# Patient Record
Sex: FEMALE | Race: WHITE | Employment: OTHER | URBAN - NONMETROPOLITAN AREA
[De-identification: names, ages, dates, MRNs, and addresses within clinical notes are randomized per-mention and may not be internally consistent; named-entity substitution may affect disease eponyms.]

---

## 2017-05-01 ENCOUNTER — OFFICE VISIT (OUTPATIENT)
Dept: INTERNAL MEDICINE | Age: 72
End: 2017-05-01

## 2017-05-01 VITALS
WEIGHT: 215 LBS | SYSTOLIC BLOOD PRESSURE: 130 MMHG | HEART RATE: 76 BPM | HEIGHT: 63 IN | BODY MASS INDEX: 38.09 KG/M2 | DIASTOLIC BLOOD PRESSURE: 80 MMHG

## 2017-05-01 DIAGNOSIS — E78.5 HYPERLIPIDEMIA, UNSPECIFIED HYPERLIPIDEMIA TYPE: ICD-10-CM

## 2017-05-01 DIAGNOSIS — K21.9 GASTROESOPHAGEAL REFLUX DISEASE, ESOPHAGITIS PRESENCE NOT SPECIFIED: ICD-10-CM

## 2017-05-01 DIAGNOSIS — G47.33 OBSTRUCTIVE SLEEP APNEA SYNDROME: ICD-10-CM

## 2017-05-01 DIAGNOSIS — I44.7 LEFT BUNDLE BRANCH BLOCK: ICD-10-CM

## 2017-05-01 DIAGNOSIS — I25.10 CORONARY ARTERY DISEASE INVOLVING NATIVE CORONARY ARTERY OF NATIVE HEART WITHOUT ANGINA PECTORIS: ICD-10-CM

## 2017-05-01 DIAGNOSIS — E66.9 OBESITY (BMI 30-39.9): ICD-10-CM

## 2017-05-01 DIAGNOSIS — Z01.818 PREOP EXAM FOR INTERNAL MEDICINE: Primary | ICD-10-CM

## 2017-05-01 DIAGNOSIS — I10 ESSENTIAL HYPERTENSION: ICD-10-CM

## 2017-05-01 DIAGNOSIS — S83.206A TEAR OF MENISCUS OF RIGHT KNEE AS CURRENT INJURY, UNSPECIFIED MENISCUS, UNSPECIFIED TEAR TYPE, INITIAL ENCOUNTER: ICD-10-CM

## 2017-05-01 PROCEDURE — 4040F PNEUMOC VAC/ADMIN/RCVD: CPT | Performed by: INTERNAL MEDICINE

## 2017-05-01 PROCEDURE — 3017F COLORECTAL CA SCREEN DOC REV: CPT | Performed by: INTERNAL MEDICINE

## 2017-05-01 PROCEDURE — 99204 OFFICE O/P NEW MOD 45 MIN: CPT | Performed by: INTERNAL MEDICINE

## 2017-05-01 PROCEDURE — 1090F PRES/ABSN URINE INCON ASSESS: CPT | Performed by: INTERNAL MEDICINE

## 2017-05-01 PROCEDURE — 3014F SCREEN MAMMO DOC REV: CPT | Performed by: INTERNAL MEDICINE

## 2017-05-01 PROCEDURE — 1036F TOBACCO NON-USER: CPT | Performed by: INTERNAL MEDICINE

## 2017-05-01 PROCEDURE — G8419 CALC BMI OUT NRM PARAM NOF/U: HCPCS | Performed by: INTERNAL MEDICINE

## 2017-05-01 PROCEDURE — G8598 ASA/ANTIPLAT THER USED: HCPCS | Performed by: INTERNAL MEDICINE

## 2017-05-01 PROCEDURE — G8427 DOCREV CUR MEDS BY ELIG CLIN: HCPCS | Performed by: INTERNAL MEDICINE

## 2017-05-01 RX ORDER — CETIRIZINE HYDROCHLORIDE 10 MG/1
10 TABLET ORAL DAILY PRN
COMMUNITY

## 2017-07-17 ENCOUNTER — HOSPITAL ENCOUNTER (OUTPATIENT)
Dept: WOMENS IMAGING | Age: 72
Discharge: HOME | End: 2017-07-17
Payer: MEDICARE

## 2017-07-17 ENCOUNTER — HOSPITAL ENCOUNTER (OUTPATIENT)
Age: 72
Discharge: HOME | End: 2017-07-17
Payer: MEDICARE

## 2017-07-17 DIAGNOSIS — Z12.31 VISIT FOR SCREENING MAMMOGRAM: ICD-10-CM

## 2017-07-17 DIAGNOSIS — Z13.820 SCREENING FOR OSTEOPOROSIS: ICD-10-CM

## 2017-07-17 LAB
ALT SERPL-CCNC: 16 U/L (ref 11–66)
AST SERPL-CCNC: 24 U/L (ref 5–40)
BACTERIA: NORMAL
BILIRUBIN URINE: NEGATIVE
BLOOD, URINE: NEGATIVE
CASTS: NORMAL /LPF
CASTS: NORMAL /LPF
CHARACTER, URINE: NORMAL
CHOLESTEROL, TOTAL: 200 MG/DL (ref 100–199)
COLOR: YELLOW
CRYSTALS: NORMAL
EPITHELIAL CELLS, UA: NORMAL /HPF
GLUCOSE, URINE: NEGATIVE MG/DL
HDLC SERPL-MCNC: 92 MG/DL
KETONES, URINE: NEGATIVE
LDL CHOLESTEROL CALCULATED: 90 MG/DL
LEUKOCYTE EST, POC: NEGATIVE
MISCELLANEOUS LAB TEST RESULT: NORMAL
NITRITE, URINE: NEGATIVE
PH UA: 5
PROTEIN UA: NEGATIVE MG/DL
RBC URINE: NORMAL /HPF
RENAL EPITHELIAL, UA: NORMAL
SPECIFIC GRAVITY UA: 1.02 (ref 1–1.03)
TRIGL SERPL-MCNC: 91 MG/DL (ref 0–199)
UROBILINOGEN, URINE: 0.2 EU/DL
WBC UA: NORMAL /HPF
YEAST: NORMAL

## 2017-07-17 PROCEDURE — 36415 COLL VENOUS BLD VENIPUNCTURE: CPT

## 2017-07-17 PROCEDURE — 77080 DXA BONE DENSITY AXIAL: CPT

## 2017-07-17 PROCEDURE — 84460 ALANINE AMINO (ALT) (SGPT): CPT

## 2017-07-17 PROCEDURE — 84450 TRANSFERASE (AST) (SGOT): CPT

## 2017-07-17 PROCEDURE — 81001 URINALYSIS AUTO W/SCOPE: CPT

## 2017-07-17 PROCEDURE — 80061 LIPID PANEL: CPT

## 2017-07-17 PROCEDURE — 77063 BREAST TOMOSYNTHESIS BI: CPT

## 2017-07-21 ENCOUNTER — HOSPITAL ENCOUNTER (OUTPATIENT)
Dept: PULMONOLOGY | Age: 72
Discharge: HOME | End: 2017-07-21
Payer: MEDICARE

## 2017-07-21 PROCEDURE — 94060 EVALUATION OF WHEEZING: CPT

## 2017-07-27 ENCOUNTER — HOSPITAL ENCOUNTER (OUTPATIENT)
Age: 72
Discharge: HOME | End: 2017-07-27
Payer: MEDICARE

## 2017-07-27 PROCEDURE — 82272 OCCULT BLD FECES 1-3 TESTS: CPT

## 2017-08-18 ENCOUNTER — HOSPITAL ENCOUNTER (OUTPATIENT)
Age: 72
Discharge: HOME | End: 2017-08-18
Payer: MEDICARE

## 2017-08-18 LAB
ALBUMIN SERPL-MCNC: 4 G/DL (ref 3.5–5.1)
ALP BLD-CCNC: 79 U/L (ref 38–126)
ALT SERPL-CCNC: 15 U/L (ref 11–66)
ANION GAP SERPL CALCULATED.3IONS-SCNC: 14 MEQ/L (ref 8–16)
AST SERPL-CCNC: 23 U/L (ref 5–40)
BILIRUB SERPL-MCNC: 1 MG/DL (ref 0.3–1.2)
BILIRUBIN DIRECT: < 0.2 MG/DL (ref 0–0.3)
BUN BLDV-MCNC: 18 MG/DL (ref 7–22)
CALCIUM SERPL-MCNC: 9.3 MG/DL (ref 8.5–10.5)
CHLORIDE BLD-SCNC: 106 MEQ/L (ref 98–111)
CHOLESTEROL, TOTAL: 186 MG/DL (ref 100–199)
CO2: 22 MEQ/L (ref 23–33)
CREAT SERPL-MCNC: 0.6 MG/DL (ref 0.4–1.2)
GFR SERPL CREATININE-BSD FRML MDRD: > 90 ML/MIN/1.73M2
GLUCOSE BLD-MCNC: 104 MG/DL (ref 70–108)
HCT VFR BLD CALC: 37.8 % (ref 37–47)
HDLC SERPL-MCNC: 80 MG/DL
HEMOGLOBIN: 12.7 GM/DL (ref 12–16)
LDL CHOLESTEROL CALCULATED: 93 MG/DL
MCH RBC QN AUTO: 29.1 PG (ref 27–31)
MCHC RBC AUTO-ENTMCNC: 33.6 GM/DL (ref 33–37)
MCV RBC AUTO: 86.8 FL (ref 81–99)
PDW BLD-RTO: 14.1 % (ref 11.5–14.5)
PLATELET # BLD: 232 THOU/MM3 (ref 130–400)
PMV BLD AUTO: 7.8 MCM (ref 7.4–10.4)
POTASSIUM SERPL-SCNC: 4.3 MEQ/L (ref 3.5–5.2)
RBC # BLD: 4.36 MILL/MM3 (ref 4.2–5.4)
SODIUM BLD-SCNC: 142 MEQ/L (ref 135–145)
TOTAL PROTEIN: 6.9 G/DL (ref 6.1–8)
TRIGL SERPL-MCNC: 65 MG/DL (ref 0–199)
WBC # BLD: 4.3 THOU/MM3 (ref 4.8–10.8)

## 2017-08-18 PROCEDURE — 82248 BILIRUBIN DIRECT: CPT

## 2017-08-18 PROCEDURE — 80061 LIPID PANEL: CPT

## 2017-08-18 PROCEDURE — 36415 COLL VENOUS BLD VENIPUNCTURE: CPT

## 2017-08-18 PROCEDURE — 80053 COMPREHEN METABOLIC PANEL: CPT

## 2017-08-18 PROCEDURE — 85027 COMPLETE CBC AUTOMATED: CPT

## 2017-09-18 ENCOUNTER — HOSPITAL ENCOUNTER (OUTPATIENT)
Age: 72
Discharge: HOME | End: 2017-09-18
Payer: MEDICARE

## 2017-09-18 ENCOUNTER — HOSPITAL ENCOUNTER (OUTPATIENT)
Dept: GENERAL RADIOLOGY | Age: 72
Discharge: HOME | End: 2017-09-18
Payer: MEDICARE

## 2017-09-18 DIAGNOSIS — R06.02 SOB (SHORTNESS OF BREATH): ICD-10-CM

## 2017-09-18 DIAGNOSIS — J45.909 UNCOMPLICATED ASTHMA, UNSPECIFIED ASTHMA SEVERITY: ICD-10-CM

## 2017-09-18 PROCEDURE — 71020 XR CHEST STANDARD TWO VW: CPT

## 2017-11-25 ENCOUNTER — HOSPITAL ENCOUNTER (EMERGENCY)
Age: 72
Discharge: HOME | End: 2017-11-25
Payer: MEDICARE

## 2017-11-25 VITALS
HEART RATE: 74 BPM | BODY MASS INDEX: 37.21 KG/M2 | HEIGHT: 63 IN | TEMPERATURE: 98.1 F | WEIGHT: 210 LBS | OXYGEN SATURATION: 96 % | DIASTOLIC BLOOD PRESSURE: 74 MMHG | SYSTOLIC BLOOD PRESSURE: 149 MMHG | RESPIRATION RATE: 16 BRPM

## 2017-11-25 DIAGNOSIS — J01.10 ACUTE NON-RECURRENT FRONTAL SINUSITIS: Primary | ICD-10-CM

## 2017-11-25 PROCEDURE — 99213 OFFICE O/P EST LOW 20 MIN: CPT | Performed by: NURSE PRACTITIONER

## 2017-11-25 PROCEDURE — 99214 OFFICE O/P EST MOD 30 MIN: CPT

## 2017-11-25 RX ORDER — AZITHROMYCIN 250 MG/1
TABLET, FILM COATED ORAL
Qty: 6 TABLET | Refills: 0 | Status: SHIPPED | OUTPATIENT
Start: 2017-11-25 | End: 2018-11-20

## 2017-11-25 RX ORDER — TRAMADOL HYDROCHLORIDE 50 MG/1
50 TABLET ORAL EVERY 6 HOURS PRN
COMMUNITY
End: 2019-05-02 | Stop reason: SDUPTHER

## 2017-11-25 ASSESSMENT — ENCOUNTER SYMPTOMS
SORE THROAT: 1
CONSTIPATION: 0
SHORTNESS OF BREATH: 0
COUGH: 1
SINUS PRESSURE: 1
DIARRHEA: 0
WHEEZING: 0
NAUSEA: 0
SINUS PAIN: 1
ABDOMINAL PAIN: 0

## 2017-11-25 NOTE — ED NOTES
Discharge assessment complete. No changes. All discharge education and information given. Pt instructed to go to ED for any shortness of breath, chest pain or Abd pain. Verbalized Understanding. Left stable.      Erick Bear, JONATHAN  96/00/92 [id number]

## 2017-11-25 NOTE — ED PROVIDER NOTES
[facility]  Urgent Care Encounter      CHIEF COMPLAINT       Chief Complaint   Patient presents with    Cough    Nasal Congestion    Facial Pain    Headache       Nurses Notes reviewed and I agree except as noted in the HPI. HISTORY OF PRESENT ILLNESS   Sal Boateng is a 67 y.o. female who presents with one Week of worsening sinus pressure and pain, headache, nasal congestion, otalgia and cough. Denies fever, nausea vomiting diarrhea, chest pain or sore throat. REVIEW OF SYSTEMS     Review of Systems   Constitutional: Negative for appetite change, diaphoresis, fatigue and fever. HENT: Positive for congestion, postnasal drip, sinus pain, sinus pressure and sore throat. Negative for tinnitus. Eyes: Negative for visual disturbance. Respiratory: Positive for cough. Negative for shortness of breath and wheezing. Cardiovascular: Negative for chest pain and leg swelling. Gastrointestinal: Negative for abdominal pain, constipation, diarrhea and nausea. Genitourinary: Negative for frequency. Musculoskeletal: Negative for arthralgias, myalgias and neck stiffness. Skin: Negative for rash. Neurological: Positive for headaches. Negative for dizziness and weakness. Psychiatric/Behavioral: The patient is not nervous/anxious. All other systems reviewed and are negative. PAST MEDICAL HISTORY         Diagnosis Date    Anxiety     Back pain     CAD (coronary artery disease)     mild disease in small vessels per cath 2014    GERD (gastroesophageal reflux disease)     Hives     Hyperlipidemia     Hypertension     Left bundle branch block     Measles     Obesity (BMI 30-39. 9)     Seasonal allergies     Sleep apnea     wears CPAP nightly       SURGICAL HISTORY     Patient  has a past surgical history that includes sinus surgery (1990); Rotator cuff repair (Bilateral, 2006  & 2009); Carpal tunnel release (08/2006); Rotator cuff repair (11/2009);  Cardiac catheterization (10/30/14); eye surgery (Left, 04/23/2016); eye surgery (Right, 05/24/2006); Carpal tunnel release (Left, 2009); and Colonoscopy (2007). CURRENT MEDICATIONS       Discharge Medication List as of 11/25/2017  6:02 PM      CONTINUE these medications which have NOT CHANGED    Details   traMADol (ULTRAM) 50 MG tablet Take 50 mg by mouth every 6 hours as needed for Pain . Historical Med      cetirizine (ZYRTEC) 10 MG tablet Take 10 mg by mouth daily as needed for AllergiesHistorical Med      D3-50 13921 UNITS CAPS TAKE ONE CAPSULE BY MOUTH ONCE A WEEK, Disp-30 capsule, R-0Normal      Lactobacillus-Inulin ([facility]) CAPS Take by mouth      Magnesium Oxide 500 MG CAPS Take by mouth 3 times daily For more movements      nitroGLYCERIN (NITROSTAT) 0.4 MG SL tablet Place 0.4 mg under the tongue every 5 minutes as needed for Chest pain. albuterol-ipratropium (COMBIVENT RESPIMAT)  MCG/ACT AERS inhaler Inhale 1 puff into the lungs every 6 hours as needed for Wheezing. aspirin 81 MG tablet Take 81 mg by mouth daily. b complex vitamins capsule Take 1 capsule by mouth three times daily B-Right by Tyra Flight at [facility]  before and as finish breakfast and before supper      isosorbide mononitrate (IMDUR) 30 MG CR tablet Take 30 mg by mouth daily. diltiazem (CARDIZEM SR) 120 MG SR capsule Take 120 mg by mouth daily. pravastatin (PRAVACHOL) 40 MG tablet Take 40 mg by mouth daily. fluticasone (FLONASE) 50 MCG/ACT nasal spray 2 sprays by Nasal route daily. Apply daily to each nare, Disp-1 Bottle, R-0      Omega-3 1400 MG CAPS Take 1 capsule by mouth 4 times daily [facility] #[id number]  before and as finish breakfast and supperHistorical Med      Cinnamon 500 MG CAPS Take 3 capsules by mouth 4 times daily before and as finish breakfast and supper      sertraline (ZOLOFT) 50 MG tablet Take 50 mg by mouth daily.              ALLERGIES     Patient is is allergic to duricef and thought content normal. Cognition and memory are normal.       DIAGNOSTIC RESULTS   Labs:No results found for this visit on 11/25/17. IMAGING:    URGENT CARE COURSE:     Vitals:    11/25/17 1753   BP: (!) 149/74   Pulse: 74   Resp: 16   Temp: 98.1 °F (36.7 °C)   SpO2: 96%   Weight: 210 lb (95.3 kg)   Height: 5' 3\" (1.6 m)     Patient is nontoxic and afebrile with a pulse ox of 96%. Positive maxillofacial sinus tenderness to palpation, significant nasal congestion is audible. TMs are bulging come posterior oropharynx is normal with no cervical adenopathy. Skin is warm and dry, respirations are easy and lungs are clear. Presentation consistent with acute frontal sinusitis. Medications - No data to display  PROCEDURES:  None  FINAL IMPRESSION      1.  Acute non-recurrent frontal sinusitis        DISPOSITION/PLAN   DISPOSITION Decision to Discharge  PATIENT REFERRED TO:  Gertrude Barakat MD  [address] [place] [address]    In 3 days  If symptoms worsen    DISCHARGE MEDICATIONS:  Discharge Medication List as of 11/25/2017  6:02 PM      START taking these medications    Details   azithromycin (ZITHROMAX Z-GLORIA) 250 MG tablet 2 tablets day 1 then1 tablet days 2 - 5., Disp-6 tablet, R-0Normal           Discharge Medication List as of 11/25/2017  6:02 PM          LAVONNE Pruitt NP  11/25/17 7207

## 2018-07-27 ENCOUNTER — APPOINTMENT (OUTPATIENT)
Dept: GENERAL RADIOLOGY | Age: 73
End: 2018-07-27
Payer: MEDICARE

## 2018-07-27 ENCOUNTER — HOSPITAL ENCOUNTER (EMERGENCY)
Age: 73
Discharge: HOME | End: 2018-07-27
Attending: EMERGENCY MEDICINE
Payer: MEDICARE

## 2018-07-27 VITALS
SYSTOLIC BLOOD PRESSURE: 117 MMHG | HEART RATE: 67 BPM | TEMPERATURE: 97.5 F | DIASTOLIC BLOOD PRESSURE: 78 MMHG | RESPIRATION RATE: 16 BRPM | OXYGEN SATURATION: 96 %

## 2018-07-27 DIAGNOSIS — R07.89 OTHER CHEST PAIN: Primary | ICD-10-CM

## 2018-07-27 LAB
ANION GAP SERPL CALCULATED.3IONS-SCNC: 16 MEQ/L (ref 8–16)
BASOPHILS # BLD: 1.4 %
BASOPHILS ABSOLUTE: 0.1 THOU/MM3 (ref 0–0.1)
BUN BLDV-MCNC: 18 MG/DL (ref 7–22)
CALCIUM SERPL-MCNC: 9.8 MG/DL (ref 8.5–10.5)
CHLORIDE BLD-SCNC: 105 MEQ/L (ref 98–111)
CO2: 21 MEQ/L (ref 23–33)
CREAT SERPL-MCNC: 0.6 MG/DL (ref 0.4–1.2)
EKG ATRIAL RATE: 78 BPM
EKG P AXIS: 45 DEGREES
EKG P-R INTERVAL: 214 MS
EKG Q-T INTERVAL: 446 MS
EKG QRS DURATION: 168 MS
EKG QTC CALCULATION (BAZETT): 508 MS
EKG R AXIS: 47 DEGREES
EKG T AXIS: 93 DEGREES
EKG VENTRICULAR RATE: 78 BPM
EOSINOPHIL # BLD: 6.8 %
EOSINOPHILS ABSOLUTE: 0.3 THOU/MM3 (ref 0–0.4)
ERYTHROCYTE [DISTWIDTH] IN BLOOD BY AUTOMATED COUNT: 13.2 % (ref 11.5–14.5)
ERYTHROCYTE [DISTWIDTH] IN BLOOD BY AUTOMATED COUNT: 43.9 FL (ref 35–45)
GFR SERPL CREATININE-BSD FRML MDRD: > 90 ML/MIN/1.73M2
GLUCOSE BLD-MCNC: 108 MG/DL (ref 70–108)
HCT VFR BLD CALC: 41.7 % (ref 37–47)
HEMOGLOBIN: 13.7 GM/DL (ref 12–16)
IMMATURE GRANS (ABS): 0 THOU/MM3 (ref 0–0.07)
IMMATURE GRANULOCYTES: 0 %
LYMPHOCYTES # BLD: 37.3 %
LYMPHOCYTES ABSOLUTE: 1.6 THOU/MM3 (ref 1–4.8)
MCH RBC QN AUTO: 30 PG (ref 26–33)
MCHC RBC AUTO-ENTMCNC: 32.9 GM/DL (ref 32.2–35.5)
MCV RBC AUTO: 91.2 FL (ref 81–99)
MONOCYTES # BLD: 6.1 %
MONOCYTES ABSOLUTE: 0.3 THOU/MM3 (ref 0.4–1.3)
NUCLEATED RED BLOOD CELLS: 0 /100 WBC
OSMOLALITY CALCULATION: 285.5 MOSMOL/KG (ref 275–300)
PLATELET # BLD: 213 THOU/MM3 (ref 130–400)
PMV BLD AUTO: 10.1 FL (ref 9.4–12.4)
POTASSIUM SERPL-SCNC: 4 MEQ/L (ref 3.5–5.2)
RBC # BLD: 4.57 MILL/MM3 (ref 4.2–5.4)
SEG NEUTROPHILS: 48.4 %
SEGMENTED NEUTROPHILS ABSOLUTE COUNT: 2.1 THOU/MM3 (ref 1.8–7.7)
SODIUM BLD-SCNC: 142 MEQ/L (ref 135–145)
TROPONIN T: < 0.01 NG/ML
TROPONIN T: < 0.01 NG/ML
WBC # BLD: 4.4 THOU/MM3 (ref 4.8–10.8)

## 2018-07-27 PROCEDURE — 36415 COLL VENOUS BLD VENIPUNCTURE: CPT

## 2018-07-27 PROCEDURE — 80048 BASIC METABOLIC PNL TOTAL CA: CPT

## 2018-07-27 PROCEDURE — 71045 X-RAY EXAM CHEST 1 VIEW: CPT

## 2018-07-27 PROCEDURE — 99285 EMERGENCY DEPT VISIT HI MDM: CPT

## 2018-07-27 PROCEDURE — 6370000000 HC RX 637 (ALT 250 FOR IP): Performed by: EMERGENCY MEDICINE

## 2018-07-27 PROCEDURE — 6370000000 HC RX 637 (ALT 250 FOR IP)

## 2018-07-27 PROCEDURE — 85025 COMPLETE CBC W/AUTO DIFF WBC: CPT

## 2018-07-27 PROCEDURE — 93010 ELECTROCARDIOGRAM REPORT: CPT | Performed by: INTERNAL MEDICINE

## 2018-07-27 PROCEDURE — 93005 ELECTROCARDIOGRAM TRACING: CPT | Performed by: EMERGENCY MEDICINE

## 2018-07-27 PROCEDURE — 84484 ASSAY OF TROPONIN QUANT: CPT

## 2018-07-27 RX ORDER — NITROGLYCERIN 0.4 MG/1
0.4 TABLET SUBLINGUAL EVERY 5 MIN PRN
Status: DISCONTINUED | OUTPATIENT
Start: 2018-07-27 | End: 2018-07-27 | Stop reason: HOSPADM

## 2018-07-27 RX ADMIN — NITROGLYCERIN 0.4 MG: 0.4 TABLET SUBLINGUAL at 13:23

## 2018-07-27 RX ADMIN — NITROGLYCERIN 0.4 MG: 0.4 TABLET SUBLINGUAL at 13:16

## 2018-07-27 ASSESSMENT — ENCOUNTER SYMPTOMS
VOMITING: 0
SHORTNESS OF BREATH: 0
DIARRHEA: 0
COUGH: 0
EYE PAIN: 0
ABDOMINAL PAIN: 0
RHINORRHEA: 0
BACK PAIN: 1
NAUSEA: 0
EYE DISCHARGE: 0
WHEEZING: 0
SORE THROAT: 0

## 2018-07-27 ASSESSMENT — PAIN SCALES - GENERAL
PAINLEVEL_OUTOF10: 1
PAINLEVEL_OUTOF10: 1
PAINLEVEL_OUTOF10: 6
PAINLEVEL_OUTOF10: 2

## 2018-07-27 ASSESSMENT — PAIN DESCRIPTION - ORIENTATION
ORIENTATION: LEFT

## 2018-07-27 ASSESSMENT — PAIN DESCRIPTION - LOCATION
LOCATION: CHEST
LOCATION: CHEST
LOCATION: CHEST;ARM
LOCATION: CHEST

## 2018-07-27 ASSESSMENT — PAIN DESCRIPTION - FREQUENCY: FREQUENCY: CONTINUOUS

## 2018-07-27 ASSESSMENT — PAIN DESCRIPTION - DESCRIPTORS: DESCRIPTORS: TIGHTNESS

## 2018-07-27 ASSESSMENT — PAIN DESCRIPTION - PAIN TYPE
TYPE: ACUTE PAIN
TYPE: ACUTE PAIN

## 2018-07-27 ASSESSMENT — PAIN DESCRIPTION - PROGRESSION: CLINICAL_PROGRESSION: NOT CHANGED

## 2018-07-27 NOTE — ED NOTES
Pt states CP is much better after NTG x 2. VSS- family at bedside.       Cassia Storm RN  07/27/18 [id number]

## 2018-07-27 NOTE — ED PROVIDER NOTES
[facility]  eMERGENCY dEPARTMENT eNCOUnter          CHIEF COMPLAINT       Chief Complaint   Patient presents with    Chest Pain       Nurses Notes reviewed and I agree except as noted in the HPI. HISTORY OF PRESENT ILLNESS    May Grier is a 68 y.o. female who presents to the Emergency Department for the evaluation of chest pain that began at 9:00 AM this morning. Patient explains that it woke her up from her sleep and that her pain is located mid-sternally but radiated down her left breast, left arm, and left hand. She is also having some pain between her shoulder blades. Her cardiologist is Dr. Hunter Hicks MD and she had a stress test in 2014 as well as a cardiac catheterization which were both normal. She does have nitro but did not take any but does take aspirin daily. Patient denies any fevers, chills, nausea, vomiting, diarrhea, or shortness of breath. Patient has no further complaints during initial evaluation. The HPI was provided by the patient. REVIEW OF SYSTEMS     Review of Systems   Constitutional: Negative for appetite change, chills, fatigue and fever. HENT: Negative for congestion, ear pain, rhinorrhea and sore throat. Eyes: Negative for pain, discharge and visual disturbance. Respiratory: Negative for cough, shortness of breath and wheezing. Cardiovascular: Positive for chest pain (mid sternal- left breast pain). Negative for palpitations and leg swelling. Gastrointestinal: Negative for abdominal pain, diarrhea, nausea and vomiting. Genitourinary: Negative for difficulty urinating, dysuria, hematuria and vaginal discharge. Musculoskeletal: Positive for arthralgias (left arm pain and left hand pain) and back pain (between shoulders). Negative for joint swelling and neck pain. Skin: Negative for pallor and rash. Neurological: Negative for dizziness, syncope, weakness, light-headedness, numbness and headaches. Hematological: Negative for adenopathy.

## 2018-07-27 NOTE — ED NOTES
Pt was up to bathroom without difficulty. States doctor was at bedside to update her on results and POC. Pt voices understanding. States pain is unchanged. Respirations are easy and unlabored. Skin warm and dry. Family at bedside. Warm blanket provided for comfort.       Claude Rumple, RN  07/27/18 7271

## 2018-09-10 ENCOUNTER — HOSPITAL ENCOUNTER (OUTPATIENT)
Age: 73
Discharge: HOME | End: 2018-09-10
Payer: MEDICARE

## 2018-09-10 LAB
ALBUMIN SERPL-MCNC: 4.5 G/DL (ref 3.5–5.1)
ALP BLD-CCNC: 88 U/L (ref 38–126)
ALT SERPL-CCNC: 14 U/L (ref 11–66)
ANION GAP SERPL CALCULATED.3IONS-SCNC: 13 MEQ/L (ref 8–16)
AST SERPL-CCNC: 22 U/L (ref 5–40)
BILIRUB SERPL-MCNC: 1.1 MG/DL (ref 0.3–1.2)
BILIRUBIN DIRECT: < 0.2 MG/DL (ref 0–0.3)
BUN BLDV-MCNC: 14 MG/DL (ref 7–22)
CALCIUM SERPL-MCNC: 9.9 MG/DL (ref 8.5–10.5)
CHLORIDE BLD-SCNC: 104 MEQ/L (ref 98–111)
CHOLESTEROL, TOTAL: 177 MG/DL (ref 100–199)
CO2: 25 MEQ/L (ref 23–33)
CREAT SERPL-MCNC: 0.7 MG/DL (ref 0.4–1.2)
ERYTHROCYTE [DISTWIDTH] IN BLOOD BY AUTOMATED COUNT: 13.4 % (ref 11.5–14.5)
ERYTHROCYTE [DISTWIDTH] IN BLOOD BY AUTOMATED COUNT: 45.1 FL (ref 35–45)
GFR SERPL CREATININE-BSD FRML MDRD: 82 ML/MIN/1.73M2
GLUCOSE BLD-MCNC: 104 MG/DL (ref 70–108)
HCT VFR BLD CALC: 41.1 % (ref 37–47)
HDLC SERPL-MCNC: 79 MG/DL
HEMOGLOBIN: 13.5 GM/DL (ref 12–16)
LDL CHOLESTEROL CALCULATED: 82 MG/DL
MCH RBC QN AUTO: 30.1 PG (ref 26–33)
MCHC RBC AUTO-ENTMCNC: 32.8 GM/DL (ref 32.2–35.5)
MCV RBC AUTO: 91.7 FL (ref 81–99)
PLATELET # BLD: 199 THOU/MM3 (ref 130–400)
PMV BLD AUTO: 10.4 FL (ref 9.4–12.4)
POTASSIUM SERPL-SCNC: 4.4 MEQ/L (ref 3.5–5.2)
RBC # BLD: 4.48 MILL/MM3 (ref 4.2–5.4)
SODIUM BLD-SCNC: 142 MEQ/L (ref 135–145)
TOTAL PROTEIN: 7.3 G/DL (ref 6.1–8)
TRIGL SERPL-MCNC: 81 MG/DL (ref 0–199)
TSH SERPL DL<=0.05 MIU/L-ACNC: 2.77 UIU/ML (ref 0.4–4.2)
WBC # BLD: 4.1 THOU/MM3 (ref 4.8–10.8)

## 2018-09-10 PROCEDURE — 36415 COLL VENOUS BLD VENIPUNCTURE: CPT

## 2018-09-10 PROCEDURE — 85027 COMPLETE CBC AUTOMATED: CPT

## 2018-09-10 PROCEDURE — 84443 ASSAY THYROID STIM HORMONE: CPT

## 2018-09-10 PROCEDURE — 80061 LIPID PANEL: CPT

## 2018-09-10 PROCEDURE — 80053 COMPREHEN METABOLIC PANEL: CPT

## 2018-09-10 PROCEDURE — 82248 BILIRUBIN DIRECT: CPT

## 2018-09-21 ENCOUNTER — HOSPITAL ENCOUNTER (OUTPATIENT)
Age: 73
Discharge: HOME | End: 2018-09-21
Payer: MEDICARE

## 2018-09-21 LAB
ALBUMIN SERPL-MCNC: 4.5 G/DL (ref 3.5–5.1)
ALP BLD-CCNC: 83 U/L (ref 38–126)
ALT SERPL-CCNC: 13 U/L (ref 11–66)
ANION GAP SERPL CALCULATED.3IONS-SCNC: 12 MEQ/L (ref 8–16)
AST SERPL-CCNC: 20 U/L (ref 5–40)
BASOPHILS # BLD: 0.9 %
BASOPHILS ABSOLUTE: 0 THOU/MM3 (ref 0–0.1)
BILIRUB SERPL-MCNC: 0.9 MG/DL (ref 0.3–1.2)
BILIRUBIN DIRECT: < 0.2 MG/DL (ref 0–0.3)
BUN BLDV-MCNC: 16 MG/DL (ref 7–22)
CALCIUM SERPL-MCNC: 9.3 MG/DL (ref 8.5–10.5)
CHLORIDE BLD-SCNC: 106 MEQ/L (ref 98–111)
CHOLESTEROL, TOTAL: 177 MG/DL (ref 100–199)
CO2: 24 MEQ/L (ref 23–33)
CREAT SERPL-MCNC: 0.7 MG/DL (ref 0.4–1.2)
EOSINOPHIL # BLD: 6.9 %
EOSINOPHILS ABSOLUTE: 0.3 THOU/MM3 (ref 0–0.4)
ERYTHROCYTE [DISTWIDTH] IN BLOOD BY AUTOMATED COUNT: 13 % (ref 11.5–14.5)
ERYTHROCYTE [DISTWIDTH] IN BLOOD BY AUTOMATED COUNT: 42.8 FL (ref 35–45)
GFR SERPL CREATININE-BSD FRML MDRD: 82 ML/MIN/1.73M2
GLUCOSE BLD-MCNC: 99 MG/DL (ref 70–108)
HCT VFR BLD CALC: 39.9 % (ref 37–47)
HDLC SERPL-MCNC: 76 MG/DL
HEMOGLOBIN: 13.4 GM/DL (ref 12–16)
IMMATURE GRANS (ABS): 0.01 THOU/MM3 (ref 0–0.07)
IMMATURE GRANULOCYTES: 0.2 %
LDL CHOLESTEROL CALCULATED: 79 MG/DL
LYMPHOCYTES # BLD: 28.3 %
LYMPHOCYTES ABSOLUTE: 1.2 THOU/MM3 (ref 1–4.8)
MCH RBC QN AUTO: 30.4 PG (ref 26–33)
MCHC RBC AUTO-ENTMCNC: 33.6 GM/DL (ref 32.2–35.5)
MCV RBC AUTO: 90.5 FL (ref 81–99)
MONOCYTES # BLD: 6.5 %
MONOCYTES ABSOLUTE: 0.3 THOU/MM3 (ref 0.4–1.3)
NUCLEATED RED BLOOD CELLS: 0 /100 WBC
PLATELET # BLD: 205 THOU/MM3 (ref 130–400)
PMV BLD AUTO: 10 FL (ref 9.4–12.4)
POTASSIUM SERPL-SCNC: 4.2 MEQ/L (ref 3.5–5.2)
RBC # BLD: 4.41 MILL/MM3 (ref 4.2–5.4)
SEG NEUTROPHILS: 57.2 %
SEGMENTED NEUTROPHILS ABSOLUTE COUNT: 2.5 THOU/MM3 (ref 1.8–7.7)
SODIUM BLD-SCNC: 142 MEQ/L (ref 135–145)
TOTAL PROTEIN: 7.1 G/DL (ref 6.1–8)
TRIGL SERPL-MCNC: 108 MG/DL (ref 0–199)
WBC # BLD: 4.3 THOU/MM3 (ref 4.8–10.8)

## 2018-09-21 PROCEDURE — 80053 COMPREHEN METABOLIC PANEL: CPT

## 2018-09-21 PROCEDURE — 80061 LIPID PANEL: CPT

## 2018-09-21 PROCEDURE — 36415 COLL VENOUS BLD VENIPUNCTURE: CPT

## 2018-09-21 PROCEDURE — 85025 COMPLETE CBC W/AUTO DIFF WBC: CPT

## 2018-09-21 PROCEDURE — 82248 BILIRUBIN DIRECT: CPT

## 2018-11-20 ENCOUNTER — HOSPITAL ENCOUNTER (EMERGENCY)
Age: 73
Discharge: HOME | End: 2018-11-20
Payer: MEDICARE

## 2018-11-20 VITALS
TEMPERATURE: 98.1 F | SYSTOLIC BLOOD PRESSURE: 169 MMHG | BODY MASS INDEX: 34.49 KG/M2 | RESPIRATION RATE: 16 BRPM | HEART RATE: 66 BPM | OXYGEN SATURATION: 96 % | DIASTOLIC BLOOD PRESSURE: 79 MMHG | HEIGHT: 64 IN | WEIGHT: 202 LBS

## 2018-11-20 DIAGNOSIS — J01.00 ACUTE MAXILLARY SINUSITIS, RECURRENCE NOT SPECIFIED: Primary | ICD-10-CM

## 2018-11-20 PROCEDURE — 99212 OFFICE O/P EST SF 10 MIN: CPT

## 2018-11-20 PROCEDURE — 99213 OFFICE O/P EST LOW 20 MIN: CPT | Performed by: NURSE PRACTITIONER

## 2018-11-20 RX ORDER — AZITHROMYCIN 250 MG/1
TABLET, FILM COATED ORAL
Qty: 6 TABLET | Refills: 0 | Status: SHIPPED | OUTPATIENT
Start: 2018-11-20 | End: 2019-01-07 | Stop reason: ALTCHOICE

## 2018-11-20 ASSESSMENT — ENCOUNTER SYMPTOMS
SINUS PRESSURE: 1
BACK PAIN: 0
RHINORRHEA: 0
ABDOMINAL PAIN: 0
VOMITING: 0
CHEST TIGHTNESS: 0
NAUSEA: 0
WHEEZING: 0
DIARRHEA: 0
COUGH: 0
SWOLLEN GLANDS: 0
SINUS PAIN: 0
SORE THROAT: 0

## 2019-01-07 ENCOUNTER — HOSPITAL ENCOUNTER (EMERGENCY)
Age: 74
Discharge: HOME | End: 2019-01-07
Payer: MEDICARE

## 2019-01-07 VITALS
OXYGEN SATURATION: 95 % | TEMPERATURE: 97.5 F | SYSTOLIC BLOOD PRESSURE: 161 MMHG | DIASTOLIC BLOOD PRESSURE: 76 MMHG | RESPIRATION RATE: 16 BRPM | HEART RATE: 69 BPM

## 2019-01-07 DIAGNOSIS — J01.00 ACUTE NON-RECURRENT MAXILLARY SINUSITIS: Primary | ICD-10-CM

## 2019-01-07 PROCEDURE — 99213 OFFICE O/P EST LOW 20 MIN: CPT | Performed by: NURSE PRACTITIONER

## 2019-01-07 PROCEDURE — 99212 OFFICE O/P EST SF 10 MIN: CPT

## 2019-01-07 RX ORDER — AZITHROMYCIN 250 MG/1
TABLET, FILM COATED ORAL
Qty: 6 TABLET | Refills: 0 | Status: SHIPPED | OUTPATIENT
Start: 2019-01-07 | End: 2019-03-27

## 2019-01-07 ASSESSMENT — ENCOUNTER SYMPTOMS
NAUSEA: 0
DIARRHEA: 0
SINUS PAIN: 1
RHINORRHEA: 0
SHORTNESS OF BREATH: 0
BACK PAIN: 0
SWOLLEN GLANDS: 0
ABDOMINAL PAIN: 0
VOMITING: 0
SORE THROAT: 0
COUGH: 0
CHEST TIGHTNESS: 0
SINUS PRESSURE: 1

## 2019-01-07 ASSESSMENT — PAIN DESCRIPTION - LOCATION: LOCATION: FACE

## 2019-01-07 ASSESSMENT — PAIN DESCRIPTION - FREQUENCY: FREQUENCY: INTERMITTENT

## 2019-01-07 ASSESSMENT — PAIN SCALES - GENERAL: PAINLEVEL_OUTOF10: 5

## 2019-01-24 ENCOUNTER — HOSPITAL ENCOUNTER (OUTPATIENT)
Age: 74
Discharge: HOME | End: 2019-01-24
Payer: MEDICARE

## 2019-01-24 LAB
ALBUMIN SERPL-MCNC: 4.5 G/DL (ref 3.5–5.1)
ALP BLD-CCNC: 74 U/L (ref 38–126)
ALT SERPL-CCNC: 12 U/L (ref 11–66)
ANION GAP SERPL CALCULATED.3IONS-SCNC: 13 MEQ/L (ref 8–16)
AST SERPL-CCNC: 19 U/L (ref 5–40)
BASOPHILS # BLD: 1.4 %
BASOPHILS ABSOLUTE: 0.1 THOU/MM3 (ref 0–0.1)
BILIRUB SERPL-MCNC: 1.4 MG/DL (ref 0.3–1.2)
BUN BLDV-MCNC: 23 MG/DL (ref 7–22)
CALCIUM SERPL-MCNC: 9.5 MG/DL (ref 8.5–10.5)
CHLORIDE BLD-SCNC: 103 MEQ/L (ref 98–111)
CHOLESTEROL, TOTAL: 177 MG/DL (ref 100–199)
CO2: 24 MEQ/L (ref 23–33)
CREAT SERPL-MCNC: 0.6 MG/DL (ref 0.4–1.2)
EOSINOPHIL # BLD: 5.5 %
EOSINOPHILS ABSOLUTE: 0.2 THOU/MM3 (ref 0–0.4)
ERYTHROCYTE [DISTWIDTH] IN BLOOD BY AUTOMATED COUNT: 13 % (ref 11.5–14.5)
ERYTHROCYTE [DISTWIDTH] IN BLOOD BY AUTOMATED COUNT: 42.7 FL (ref 35–45)
FOLATE: 15.4 NG/ML (ref 4.8–24.2)
GFR SERPL CREATININE-BSD FRML MDRD: > 90 ML/MIN/1.73M2
GLUCOSE BLD-MCNC: 98 MG/DL (ref 70–108)
HCT VFR BLD CALC: 41.6 % (ref 37–47)
HDLC SERPL-MCNC: 86 MG/DL
HEMOGLOBIN: 13.7 GM/DL (ref 12–16)
IMMATURE GRANS (ABS): 0.01 THOU/MM3 (ref 0–0.07)
IMMATURE GRANULOCYTES: 0.2 %
LDL CHOLESTEROL CALCULATED: 76 MG/DL
LYMPHOCYTES # BLD: 34.2 %
LYMPHOCYTES ABSOLUTE: 1.5 THOU/MM3 (ref 1–4.8)
MAGNESIUM: 2.2 MG/DL (ref 1.6–2.4)
MCH RBC QN AUTO: 29.9 PG (ref 26–33)
MCHC RBC AUTO-ENTMCNC: 32.9 GM/DL (ref 32.2–35.5)
MCV RBC AUTO: 90.8 FL (ref 81–99)
MONOCYTES # BLD: 8.2 %
MONOCYTES ABSOLUTE: 0.4 THOU/MM3 (ref 0.4–1.3)
NUCLEATED RED BLOOD CELLS: 0 /100 WBC
PLATELET # BLD: 212 THOU/MM3 (ref 130–400)
PMV BLD AUTO: 10.1 FL (ref 9.4–12.4)
POTASSIUM SERPL-SCNC: 4.5 MEQ/L (ref 3.5–5.2)
RBC # BLD: 4.58 MILL/MM3 (ref 4.2–5.4)
SEG NEUTROPHILS: 50.5 %
SEGMENTED NEUTROPHILS ABSOLUTE COUNT: 2.2 THOU/MM3 (ref 1.8–7.7)
SODIUM BLD-SCNC: 140 MEQ/L (ref 135–145)
T4 FREE: 1.01 NG/DL (ref 0.93–1.76)
TOTAL PROTEIN: 7.1 G/DL (ref 6.1–8)
TRIGL SERPL-MCNC: 77 MG/DL (ref 0–199)
TSH SERPL DL<=0.05 MIU/L-ACNC: 3.08 UIU/ML (ref 0.4–4.2)
VITAMIN B-12: 1541 PG/ML (ref 211–911)
WBC # BLD: 4.4 THOU/MM3 (ref 4.8–10.8)

## 2019-01-24 PROCEDURE — 84443 ASSAY THYROID STIM HORMONE: CPT

## 2019-01-24 PROCEDURE — 85025 COMPLETE CBC W/AUTO DIFF WBC: CPT

## 2019-01-24 PROCEDURE — 82652 VIT D 1 25-DIHYDROXY: CPT

## 2019-01-24 PROCEDURE — 83735 ASSAY OF MAGNESIUM: CPT

## 2019-01-24 PROCEDURE — 84439 ASSAY OF FREE THYROXINE: CPT

## 2019-01-24 PROCEDURE — 80061 LIPID PANEL: CPT

## 2019-01-24 PROCEDURE — 80053 COMPREHEN METABOLIC PANEL: CPT

## 2019-01-24 PROCEDURE — 36415 COLL VENOUS BLD VENIPUNCTURE: CPT

## 2019-01-24 PROCEDURE — 82607 VITAMIN B-12: CPT

## 2019-01-24 PROCEDURE — 82746 ASSAY OF FOLIC ACID SERUM: CPT

## 2019-01-27 LAB — VITAMIN D 1,25-DIHYDROXY: 53.2 PG/ML (ref 19.9–79.3)

## 2019-03-20 ENCOUNTER — TELEPHONE (OUTPATIENT)
Dept: FAMILY MEDICINE CLINIC | Age: 74
End: 2019-03-20

## 2019-03-27 ENCOUNTER — OFFICE VISIT (OUTPATIENT)
Dept: FAMILY MEDICINE CLINIC | Age: 74
End: 2019-03-27
Payer: MEDICARE

## 2019-03-27 VITALS
HEIGHT: 64 IN | TEMPERATURE: 97.8 F | HEART RATE: 85 BPM | SYSTOLIC BLOOD PRESSURE: 124 MMHG | BODY MASS INDEX: 37.87 KG/M2 | DIASTOLIC BLOOD PRESSURE: 77 MMHG | WEIGHT: 221.8 LBS | RESPIRATION RATE: 14 BRPM

## 2019-03-27 DIAGNOSIS — F41.1 GAD (GENERALIZED ANXIETY DISORDER): Chronic | ICD-10-CM

## 2019-03-27 DIAGNOSIS — M54.50 CHRONIC BILATERAL LOW BACK PAIN WITHOUT SCIATICA: Chronic | ICD-10-CM

## 2019-03-27 DIAGNOSIS — I25.10 CORONARY ARTERY DISEASE INVOLVING NATIVE CORONARY ARTERY OF NATIVE HEART WITHOUT ANGINA PECTORIS: Primary | Chronic | ICD-10-CM

## 2019-03-27 DIAGNOSIS — M15.9 PRIMARY OSTEOARTHRITIS INVOLVING MULTIPLE JOINTS: Chronic | ICD-10-CM

## 2019-03-27 DIAGNOSIS — R79.83 HOMOCYSTEINEMIA: ICD-10-CM

## 2019-03-27 DIAGNOSIS — Z79.899 CONTROLLED SUBSTANCE AGREEMENT SIGNED: Chronic | ICD-10-CM

## 2019-03-27 DIAGNOSIS — J45.20 MILD INTERMITTENT ASTHMA WITHOUT COMPLICATION: Chronic | ICD-10-CM

## 2019-03-27 DIAGNOSIS — G89.29 CHRONIC BILATERAL LOW BACK PAIN WITHOUT SCIATICA: Chronic | ICD-10-CM

## 2019-03-27 DIAGNOSIS — I10 ESSENTIAL (PRIMARY) HYPERTENSION: ICD-10-CM

## 2019-03-27 PROCEDURE — G8482 FLU IMMUNIZE ORDER/ADMIN: HCPCS | Performed by: FAMILY MEDICINE

## 2019-03-27 PROCEDURE — G8399 PT W/DXA RESULTS DOCUMENT: HCPCS | Performed by: FAMILY MEDICINE

## 2019-03-27 PROCEDURE — 99215 OFFICE O/P EST HI 40 MIN: CPT | Performed by: FAMILY MEDICINE

## 2019-03-27 PROCEDURE — G8417 CALC BMI ABV UP PARAM F/U: HCPCS | Performed by: FAMILY MEDICINE

## 2019-03-27 PROCEDURE — G8598 ASA/ANTIPLAT THER USED: HCPCS | Performed by: FAMILY MEDICINE

## 2019-03-27 PROCEDURE — 3017F COLORECTAL CA SCREEN DOC REV: CPT | Performed by: FAMILY MEDICINE

## 2019-03-27 PROCEDURE — 4040F PNEUMOC VAC/ADMIN/RCVD: CPT | Performed by: FAMILY MEDICINE

## 2019-03-27 PROCEDURE — 1090F PRES/ABSN URINE INCON ASSESS: CPT | Performed by: FAMILY MEDICINE

## 2019-03-27 PROCEDURE — G8427 DOCREV CUR MEDS BY ELIG CLIN: HCPCS | Performed by: FAMILY MEDICINE

## 2019-03-27 PROCEDURE — 1036F TOBACCO NON-USER: CPT | Performed by: FAMILY MEDICINE

## 2019-03-27 PROCEDURE — 1123F ACP DISCUSS/DSCN MKR DOCD: CPT | Performed by: FAMILY MEDICINE

## 2019-03-27 RX ORDER — ACETAMINOPHEN 160 MG
1 TABLET,DISINTEGRATING ORAL DAILY
COMMUNITY

## 2019-03-27 RX ORDER — ANTIARTHRITIC COMBINATION NO.2 900 MG
TABLET ORAL DAILY
Status: ON HOLD | COMMUNITY
End: 2019-05-21

## 2019-03-27 RX ORDER — MELOXICAM 15 MG/1
15 TABLET ORAL DAILY
Status: ON HOLD | COMMUNITY
End: 2019-05-21 | Stop reason: HOSPADM

## 2019-03-27 ASSESSMENT — PATIENT HEALTH QUESTIONNAIRE - PHQ9
SUM OF ALL RESPONSES TO PHQ QUESTIONS 1-9: 1
1. LITTLE INTEREST OR PLEASURE IN DOING THINGS: 1
SUM OF ALL RESPONSES TO PHQ QUESTIONS 1-9: 1
2. FEELING DOWN, DEPRESSED OR HOPELESS: 0
SUM OF ALL RESPONSES TO PHQ9 QUESTIONS 1 & 2: 1

## 2019-04-30 ENCOUNTER — NURSE ONLY (OUTPATIENT)
Dept: LAB | Age: 74
End: 2019-04-30

## 2019-04-30 LAB
ALBUMIN SERPL-MCNC: 4.4 G/DL (ref 3.5–5.1)
ALP BLD-CCNC: 72 U/L (ref 38–126)
ALT SERPL-CCNC: 13 U/L (ref 11–66)
ANION GAP SERPL CALCULATED.3IONS-SCNC: 13 MEQ/L (ref 8–16)
AST SERPL-CCNC: 19 U/L (ref 5–40)
BILIRUB SERPL-MCNC: 1.1 MG/DL (ref 0.3–1.2)
BILIRUBIN DIRECT: < 0.2 MG/DL (ref 0–0.3)
BUN BLDV-MCNC: 23 MG/DL (ref 7–22)
CALCIUM SERPL-MCNC: 9.5 MG/DL (ref 8.5–10.5)
CHLORIDE BLD-SCNC: 107 MEQ/L (ref 98–111)
CHOLESTEROL, TOTAL: 180 MG/DL (ref 100–199)
CO2: 22 MEQ/L (ref 23–33)
CREAT SERPL-MCNC: 0.7 MG/DL (ref 0.4–1.2)
ERYTHROCYTE [DISTWIDTH] IN BLOOD BY AUTOMATED COUNT: 13.1 % (ref 11.5–14.5)
ERYTHROCYTE [DISTWIDTH] IN BLOOD BY AUTOMATED COUNT: 42.8 FL (ref 35–45)
GFR SERPL CREATININE-BSD FRML MDRD: 82 ML/MIN/1.73M2
GLUCOSE BLD-MCNC: 114 MG/DL (ref 70–108)
HCT VFR BLD CALC: 41.2 % (ref 37–47)
HDLC SERPL-MCNC: 72 MG/DL
HEMOGLOBIN: 13.6 GM/DL (ref 12–16)
LDL CHOLESTEROL CALCULATED: 89 MG/DL
MCH RBC QN AUTO: 30.1 PG (ref 26–33)
MCHC RBC AUTO-ENTMCNC: 33 GM/DL (ref 32.2–35.5)
MCV RBC AUTO: 91.2 FL (ref 81–99)
PLATELET # BLD: 253 THOU/MM3 (ref 130–400)
PMV BLD AUTO: 9.8 FL (ref 9.4–12.4)
POTASSIUM SERPL-SCNC: 4.6 MEQ/L (ref 3.5–5.2)
RBC # BLD: 4.52 MILL/MM3 (ref 4.2–5.4)
SODIUM BLD-SCNC: 142 MEQ/L (ref 135–145)
TOTAL PROTEIN: 7.3 G/DL (ref 6.1–8)
TRIGL SERPL-MCNC: 97 MG/DL (ref 0–199)
WBC # BLD: 5.1 THOU/MM3 (ref 4.8–10.8)

## 2019-05-02 DIAGNOSIS — M15.9 PRIMARY OSTEOARTHRITIS INVOLVING MULTIPLE JOINTS: Primary | ICD-10-CM

## 2019-05-02 DIAGNOSIS — Z79.899 CONTROLLED SUBSTANCE AGREEMENT SIGNED: ICD-10-CM

## 2019-05-02 RX ORDER — TRAMADOL HYDROCHLORIDE 50 MG/1
50 TABLET ORAL 2 TIMES DAILY PRN
Qty: 60 TABLET | Refills: 2 | Status: ON HOLD | OUTPATIENT
Start: 2019-05-02 | End: 2019-05-21

## 2019-05-02 NOTE — TELEPHONE ENCOUNTER
5/2/19   Ale Estrada called requesting a refill on the following medications:  Requested Prescriptions     Pending Prescriptions Disp Refills    traMADol (ULTRAM) 50 MG tablet       Sig: Take 1 tablet by mouth every 6 hours as needed for Pain. Pharmacy verified:   [facility] [address]  . pv      Date of last visit:  3/27/19  Date of next visit (if applicable): 6/25/9901  blm

## 2019-05-16 ENCOUNTER — APPOINTMENT (OUTPATIENT)
Dept: GENERAL RADIOLOGY | Age: 74
DRG: 286 | End: 2019-05-16
Payer: MEDICARE

## 2019-05-16 ENCOUNTER — HOSPITAL ENCOUNTER (INPATIENT)
Age: 74
LOS: 5 days | Discharge: HOME | DRG: 286 | End: 2019-05-22
Attending: INTERNAL MEDICINE | Admitting: INTERNAL MEDICINE
Payer: MEDICARE

## 2019-05-16 ENCOUNTER — APPOINTMENT (OUTPATIENT)
Dept: CT IMAGING | Age: 74
DRG: 286 | End: 2019-05-16
Payer: MEDICARE

## 2019-05-16 DIAGNOSIS — R06.09 DYSPNEA ON EXERTION: ICD-10-CM

## 2019-05-16 DIAGNOSIS — J81.0 ACUTE PULMONARY EDEMA (HCC): Primary | ICD-10-CM

## 2019-05-16 DIAGNOSIS — I50.1 HEART FAILURE, LEFT, WITH LVEF <=30% (HCC): ICD-10-CM

## 2019-05-16 DIAGNOSIS — J81.0 PULMONARY EDEMA, ACUTE (HCC): ICD-10-CM

## 2019-05-16 DIAGNOSIS — R68.89 ACTIVITY INTOLERANCE: ICD-10-CM

## 2019-05-16 LAB
ANION GAP SERPL CALCULATED.3IONS-SCNC: 16 MEQ/L (ref 8–16)
BASOPHILS # BLD: 0.6 %
BASOPHILS ABSOLUTE: 0 THOU/MM3 (ref 0–0.1)
BUN BLDV-MCNC: 23 MG/DL (ref 7–22)
CALCIUM SERPL-MCNC: 9.3 MG/DL (ref 8.5–10.5)
CHLORIDE BLD-SCNC: 107 MEQ/L (ref 98–111)
CO2: 19 MEQ/L (ref 23–33)
CREAT SERPL-MCNC: 0.8 MG/DL (ref 0.4–1.2)
EKG ATRIAL RATE: 92 BPM
EKG P AXIS: -5 DEGREES
EKG P-R INTERVAL: 174 MS
EKG Q-T INTERVAL: 430 MS
EKG QRS DURATION: 172 MS
EKG QTC CALCULATION (BAZETT): 531 MS
EKG R AXIS: 23 DEGREES
EKG T AXIS: 127 DEGREES
EKG VENTRICULAR RATE: 92 BPM
EOSINOPHIL # BLD: 4 %
EOSINOPHILS ABSOLUTE: 0.3 THOU/MM3 (ref 0–0.4)
ERYTHROCYTE [DISTWIDTH] IN BLOOD BY AUTOMATED COUNT: 12.7 % (ref 11.5–14.5)
ERYTHROCYTE [DISTWIDTH] IN BLOOD BY AUTOMATED COUNT: 42.4 FL (ref 35–45)
GFR SERPL CREATININE-BSD FRML MDRD: 70 ML/MIN/1.73M2
GLUCOSE BLD-MCNC: 110 MG/DL (ref 70–108)
HCT VFR BLD CALC: 45 % (ref 37–47)
HEMOGLOBIN: 14.8 GM/DL (ref 12–16)
IMMATURE GRANS (ABS): 0.01 THOU/MM3 (ref 0–0.07)
IMMATURE GRANULOCYTES: 0.2 %
LYMPHOCYTES # BLD: 30.1 %
LYMPHOCYTES ABSOLUTE: 1.9 THOU/MM3 (ref 1–4.8)
MCH RBC QN AUTO: 30.2 PG (ref 26–33)
MCHC RBC AUTO-ENTMCNC: 32.9 GM/DL (ref 32.2–35.5)
MCV RBC AUTO: 91.8 FL (ref 81–99)
MONOCYTES # BLD: 6.1 %
MONOCYTES ABSOLUTE: 0.4 THOU/MM3 (ref 0.4–1.3)
NUCLEATED RED BLOOD CELLS: 0 /100 WBC
OSMOLALITY CALCULATION: 287.4 MOSMOL/KG (ref 275–300)
PLATELET # BLD: 225 THOU/MM3 (ref 130–400)
PMV BLD AUTO: 9.5 FL (ref 9.4–12.4)
POTASSIUM REFLEX MAGNESIUM: 4.1 MEQ/L (ref 3.5–5.2)
PRO-BNP: 1379 PG/ML (ref 0–900)
RBC # BLD: 4.9 MILL/MM3 (ref 4.2–5.4)
SEG NEUTROPHILS: 59 %
SEGMENTED NEUTROPHILS ABSOLUTE COUNT: 3.7 THOU/MM3 (ref 1.8–7.7)
SODIUM BLD-SCNC: 142 MEQ/L (ref 135–145)
TROPONIN T: < 0.01 NG/ML
WBC # BLD: 6.3 THOU/MM3 (ref 4.8–10.8)

## 2019-05-16 PROCEDURE — 36415 COLL VENOUS BLD VENIPUNCTURE: CPT

## 2019-05-16 PROCEDURE — 6360000004 HC RX CONTRAST MEDICATION: Performed by: NURSE PRACTITIONER

## 2019-05-16 PROCEDURE — 83880 ASSAY OF NATRIURETIC PEPTIDE: CPT

## 2019-05-16 PROCEDURE — 84484 ASSAY OF TROPONIN QUANT: CPT

## 2019-05-16 PROCEDURE — 85025 COMPLETE CBC W/AUTO DIFF WBC: CPT

## 2019-05-16 PROCEDURE — 71275 CT ANGIOGRAPHY CHEST: CPT

## 2019-05-16 PROCEDURE — 99285 EMERGENCY DEPT VISIT HI MDM: CPT

## 2019-05-16 PROCEDURE — 71045 X-RAY EXAM CHEST 1 VIEW: CPT

## 2019-05-16 PROCEDURE — 80048 BASIC METABOLIC PNL TOTAL CA: CPT

## 2019-05-16 PROCEDURE — 93005 ELECTROCARDIOGRAM TRACING: CPT | Performed by: NURSE PRACTITIONER

## 2019-05-16 RX ADMIN — IOPAMIDOL 80 ML: 755 INJECTION, SOLUTION INTRAVENOUS at 23:56

## 2019-05-16 ASSESSMENT — ENCOUNTER SYMPTOMS
ABDOMINAL PAIN: 0
NAUSEA: 0
VOMITING: 0
COUGH: 1
SHORTNESS OF BREATH: 1

## 2019-05-17 PROBLEM — I11.0 HYPERTENSIVE HEART DISEASE WITH CONGESTIVE HEART FAILURE (HCC): Status: ACTIVE | Noted: 2019-05-17

## 2019-05-17 PROBLEM — J81.0 PULMONARY EDEMA, ACUTE (HCC): Status: ACTIVE | Noted: 2019-05-17

## 2019-05-17 LAB
ANION GAP SERPL CALCULATED.3IONS-SCNC: 16 MEQ/L (ref 8–16)
BUN BLDV-MCNC: 27 MG/DL (ref 7–22)
CALCIUM SERPL-MCNC: 9.3 MG/DL (ref 8.5–10.5)
CHLORIDE BLD-SCNC: 99 MEQ/L (ref 98–111)
CO2: 22 MEQ/L (ref 23–33)
CREAT SERPL-MCNC: 0.8 MG/DL (ref 0.4–1.2)
GFR SERPL CREATININE-BSD FRML MDRD: 70 ML/MIN/1.73M2
GLUCOSE BLD-MCNC: 104 MG/DL (ref 70–108)
LV EF: 28 %
LVEF MODALITY: NORMAL
POTASSIUM REFLEX MAGNESIUM: 3.8 MEQ/L (ref 3.5–5.2)
POTASSIUM SERPL-SCNC: 3.8 MEQ/L (ref 3.5–5.2)
SODIUM BLD-SCNC: 137 MEQ/L (ref 135–145)

## 2019-05-17 PROCEDURE — 2709999900 HC NON-CHARGEABLE SUPPLY

## 2019-05-17 PROCEDURE — 6360000002 HC RX W HCPCS: Performed by: INTERNAL MEDICINE

## 2019-05-17 PROCEDURE — 6360000002 HC RX W HCPCS: Performed by: NURSE PRACTITIONER

## 2019-05-17 PROCEDURE — 6370000000 HC RX 637 (ALT 250 FOR IP): Performed by: INTERNAL MEDICINE

## 2019-05-17 PROCEDURE — 84132 ASSAY OF SERUM POTASSIUM: CPT

## 2019-05-17 PROCEDURE — 93010 ELECTROCARDIOGRAM REPORT: CPT | Performed by: INTERNAL MEDICINE

## 2019-05-17 PROCEDURE — 2580000003 HC RX 258: Performed by: INTERNAL MEDICINE

## 2019-05-17 PROCEDURE — 36415 COLL VENOUS BLD VENIPUNCTURE: CPT

## 2019-05-17 PROCEDURE — 80048 BASIC METABOLIC PNL TOTAL CA: CPT

## 2019-05-17 PROCEDURE — 93306 TTE W/DOPPLER COMPLETE: CPT

## 2019-05-17 PROCEDURE — 2060000000 HC ICU INTERMEDIATE R&B

## 2019-05-17 RX ORDER — DILTIAZEM HYDROCHLORIDE 60 MG/1
120 CAPSULE, EXTENDED RELEASE ORAL DAILY
Status: DISCONTINUED | OUTPATIENT
Start: 2019-05-17 | End: 2019-05-19

## 2019-05-17 RX ORDER — ACETAMINOPHEN 325 MG/1
650 TABLET ORAL EVERY 4 HOURS PRN
Status: DISCONTINUED | OUTPATIENT
Start: 2019-05-17 | End: 2019-05-20 | Stop reason: SDUPTHER

## 2019-05-17 RX ORDER — CARVEDILOL 3.12 MG/1
3.12 TABLET ORAL 2 TIMES DAILY WITH MEALS
Status: DISCONTINUED | OUTPATIENT
Start: 2019-05-17 | End: 2019-05-19

## 2019-05-17 RX ORDER — ISOSORBIDE MONONITRATE 30 MG/1
30 TABLET, EXTENDED RELEASE ORAL DAILY
Status: DISCONTINUED | OUTPATIENT
Start: 2019-05-17 | End: 2019-05-20

## 2019-05-17 RX ORDER — ANTIARTHRITIC COMBINATION NO.2 900 MG
1 TABLET ORAL DAILY
Status: DISCONTINUED | OUTPATIENT
Start: 2019-05-17 | End: 2019-05-17 | Stop reason: RX

## 2019-05-17 RX ORDER — TRAMADOL HYDROCHLORIDE 50 MG/1
50 TABLET ORAL 2 TIMES DAILY PRN
Status: DISCONTINUED | OUTPATIENT
Start: 2019-05-17 | End: 2019-05-22 | Stop reason: HOSPADM

## 2019-05-17 RX ORDER — FLUTICASONE PROPIONATE 50 MCG
2 SPRAY, SUSPENSION (ML) NASAL DAILY
Status: DISCONTINUED | OUTPATIENT
Start: 2019-05-17 | End: 2019-05-22 | Stop reason: HOSPADM

## 2019-05-17 RX ORDER — FUROSEMIDE 10 MG/ML
40 INJECTION INTRAMUSCULAR; INTRAVENOUS ONCE
Status: COMPLETED | OUTPATIENT
Start: 2019-05-17 | End: 2019-05-17

## 2019-05-17 RX ORDER — SODIUM CHLORIDE 0.9 % (FLUSH) 0.9 %
10 SYRINGE (ML) INJECTION PRN
Status: DISCONTINUED | OUTPATIENT
Start: 2019-05-17 | End: 2019-05-20 | Stop reason: SDUPTHER

## 2019-05-17 RX ORDER — ASPIRIN 81 MG/1
81 TABLET ORAL DAILY
Status: DISCONTINUED | OUTPATIENT
Start: 2019-05-17 | End: 2019-05-19

## 2019-05-17 RX ORDER — PRAVASTATIN SODIUM 40 MG
40 TABLET ORAL NIGHTLY
Status: DISCONTINUED | OUTPATIENT
Start: 2019-05-17 | End: 2019-05-22 | Stop reason: HOSPADM

## 2019-05-17 RX ORDER — SODIUM CHLORIDE 0.9 % (FLUSH) 0.9 %
10 SYRINGE (ML) INJECTION EVERY 12 HOURS SCHEDULED
Status: DISCONTINUED | OUTPATIENT
Start: 2019-05-17 | End: 2019-05-20 | Stop reason: SDUPTHER

## 2019-05-17 RX ORDER — FUROSEMIDE 10 MG/ML
20 INJECTION INTRAMUSCULAR; INTRAVENOUS 2 TIMES DAILY
Status: DISCONTINUED | OUTPATIENT
Start: 2019-05-17 | End: 2019-05-19

## 2019-05-17 RX ORDER — ALBUTEROL SULFATE 90 UG/1
1 AEROSOL, METERED RESPIRATORY (INHALATION) EVERY 6 HOURS PRN
Status: DISCONTINUED | OUTPATIENT
Start: 2019-05-17 | End: 2019-05-22 | Stop reason: HOSPADM

## 2019-05-17 RX ADMIN — FUROSEMIDE 20 MG: 10 INJECTION, SOLUTION INTRAMUSCULAR; INTRAVENOUS at 09:43

## 2019-05-17 RX ADMIN — ENOXAPARIN SODIUM 40 MG: 40 INJECTION SUBCUTANEOUS at 09:43

## 2019-05-17 RX ADMIN — ASPIRIN 81 MG: 81 TABLET, COATED ORAL at 09:43

## 2019-05-17 RX ADMIN — TRAMADOL HYDROCHLORIDE 50 MG: 50 TABLET, FILM COATED ORAL at 21:52

## 2019-05-17 RX ADMIN — PRAVASTATIN SODIUM 40 MG: 40 TABLET ORAL at 19:57

## 2019-05-17 RX ADMIN — Medication 10 ML: at 09:45

## 2019-05-17 RX ADMIN — SERTRALINE 50 MG: 50 TABLET, FILM COATED ORAL at 10:10

## 2019-05-17 RX ADMIN — ISOSORBIDE MONONITRATE 30 MG: 30 TABLET ORAL at 10:10

## 2019-05-17 RX ADMIN — FUROSEMIDE 20 MG: 10 INJECTION, SOLUTION INTRAMUSCULAR; INTRAVENOUS at 17:18

## 2019-05-17 RX ADMIN — FUROSEMIDE 40 MG: 10 INJECTION, SOLUTION INTRAMUSCULAR; INTRAVENOUS at 01:46

## 2019-05-17 RX ADMIN — CARVEDILOL 3.12 MG: 3.12 TABLET, FILM COATED ORAL at 12:14

## 2019-05-17 RX ADMIN — CARVEDILOL 3.12 MG: 3.12 TABLET, FILM COATED ORAL at 19:57

## 2019-05-17 RX ADMIN — DILTIAZEM HYDROCHLORIDE 120 MG: 60 CAPSULE, EXTENDED RELEASE ORAL at 09:43

## 2019-05-17 ASSESSMENT — PAIN SCALES - GENERAL
PAINLEVEL_OUTOF10: 2
PAINLEVEL_OUTOF10: 0

## 2019-05-17 NOTE — H&P
INTERNAL MEDICINE SPECIALTIES    History & Physical    Patient:  Fidel Torres  YOB: 1945  Date of Service: 5/17/2019  MRN: [id number]   Acct:  [de-identified]   Primary Care Physician: Neema Reeves MD    Chief Complaint: Shortness of breath    History of Present Illness:   History obtained from the patient. The patient is a 68 y.o. female who presents from home with complaints of progressive shortness of breath. Context: Pt was apparently in her usual state of health until day of presentation. She had been visiting with her  who is an in-patient and was on her way home when symptoms started. She reported feeling unwell and had difficulty locating the vending machine in the hospital, even though she had used if before. She eventually gave up and decided to go home for something to eat, by the time she got to her car, she was exhausted and dyspneic, she still drove home but on getting there, she could not make it to her door, so she called EMS. On further prompting, she admitted that she has been missing her medications over the last couple of days. Significant findings on admission include: /87, SaO2 88%, BUN 23, BNP 1,379, patchy infiltrates in R lower lobe on CXR. Past Medical History:        Diagnosis Date    Asthma, mild intermittent     See's [place]    CAD (coronary artery disease)     mild disease in small vessels per cath 2014. Follows with Oracio Christensen Chronic low back pain     Controlled substance agreement signed; tramadol 3/27/2019    Dyslipidemia     LYLA (generalized anxiety disorder)     History of gastroesophageal reflux (GERD)     no longer on medication    Homocysteinemia ([address] 4/18/2016    Hypertension     Left bundle branch block     Obesity (BMI 30-39. 9)     SUNI on CPAP     wears CPAP nightly    Osteoarthritis     Osteopenia     Seasonal allergies     Vitamin D deficiency 5/24/2016       Past Surgical History:        Procedure Laterality Date    CARDIAC CATHETERIZATION  10/30/14    Dr. Ragini Collier  08/2006   Candida Och Left 2009    COLONOSCOPY  2007    EYE SURGERY Left 04/23/2016    Dr. Óscar Ramirez Right 05/24/2006    Dr. Martha Pickett Bilateral 2006  & 2009    ROTATOR CUFF REPAIR  11/2009    SINUS SURGERY  1990       Home Medications:   No current facility-administered medications on file prior to encounter. Current Outpatient Medications on File Prior to Encounter   Medication Sig Dispense Refill    traMADol (ULTRAM) 50 MG tablet Take 1 tablet by mouth 2 times daily as needed for Pain for up to 90 days. 60 tablet 2    Cholecalciferol (VITAMIN D3) 2000 units CAPS Take by mouth daily      DHEA 25 MG TABS Take by mouth daily      meloxicam (MOBIC) 15 MG tablet Take 15 mg by mouth daily      cetirizine (ZYRTEC) 10 MG tablet Take 10 mg by mouth daily as needed for Allergies      Lactobacillus-Inulin ([address]) CAPS Take 1 capsule by mouth daily       Magnesium Oxide 500 MG CAPS Take by mouth daily For more movements      nitroGLYCERIN (NITROSTAT) 0.4 MG SL tablet Place 0.4 mg under the tongue every 5 minutes as needed for Chest pain.  albuterol-ipratropium (COMBIVENT RESPIMAT)  MCG/ACT AERS inhaler Inhale 1 puff into the lungs every 6 hours as needed for Wheezing.  aspirin 81 MG tablet Take 81 mg by mouth daily.  b complex vitamins capsule Take 1 capsule by mouth daily [facility]Right [facility]  before and as finish breakfast and before supper      isosorbide mononitrate (IMDUR) 30 MG CR tablet Take 30 mg by mouth daily.  diltiazem (CARDIZEM SR) 120 MG SR capsule Take 120 mg by mouth daily.  pravastatin (PRAVACHOL) 40 MG tablet Take 40 mg by mouth daily.  fluticasone (FLONASE) 50 MCG/ACT nasal spray 2 sprays by Nasal route daily.  Apply daily to each nare (Patient taking differently: 2 sprays by Nasal route daily as needed Apply daily to each nare) 1 Bottle 0    Omega-3 1400 MG CAPS Take 1 capsule by mouth daily [facility] #[id number]  before and as finish breakfast and supper      Cinnamon 500 MG CAPS Take 3 capsules by mouth daily before and as finish breakfast and supper      sertraline (ZOLOFT) 50 MG tablet Take 50 mg by mouth daily. Allergies:  Duricef [cefadroxil]    Social History:    reports that she is a non-smoker but has been exposed to tobacco smoke. She has never used smokeless tobacco. She reports that she does not drink alcohol or use drugs. Family History:       Problem Relation Age of Onset    Heart Disease Mother     Cancer Father     Cancer Brother     Dementia Brother     Diabetes Maternal Grandmother     Heart Disease Maternal Grandmother     Glaucoma Brother        Review of systems:  Pertinent positives and negatives as contained in HPI. All other systems reviewed with no clinically significant findings. 10 point review of systems completed, all other than noted above are negative. Vitals:   Vitals:    05/17/19 0700   BP: (!) 137/91   Pulse:    Resp:    Temp:    SpO2:       BMI: Body mass index is 37.76 kg/m².                 Exam:  Physical Examination:   General appearance: alert, appears stated age and cooperative  Neck: no carotid bruit, no JVD and supple, symmetrical, trachea midline  Lungs: clear to auscultation bilaterally  Abdomen: soft, non-tender; bowel sounds normal; no masses,  no organomegaly  Extremities: extremities normal, atraumatic, no cyanosis or edema  Pulses: 2+ and symmetric  Skin: Skin color, texture, turgor normal. No rashes or lesions  Neurologic: Grossly normal    Review of Labs and Diagnostic Testing:    Recent Results (from the past 24 hour(s))   EKG 12 Lead    Collection Time: 05/16/19 10:06 PM   Result Value Ref Range    Ventricular Rate 92 BPM    Atrial Rate 92 BPM    P-R Interval 174 ms    QRS Duration 172 ms    Q-T Interval 430 ms    QTc Calculation (Angel) 531 ms    P Axis -5 degrees    R Axis 23 degrees    T Axis 127 degrees   Basic Metabolic Panel w/ Reflex to MG    Collection Time: 05/16/19 10:52 PM   Result Value Ref Range    Sodium 142 135 - 145 meq/L    Potassium reflex Magnesium 4.1 3.5 - 5.2 meq/L    Chloride 107 98 - 111 meq/L    CO2 19 (L) 23 - 33 meq/L    Glucose 110 (H) 70 - 108 mg/dL    BUN 23 (H) 7 - 22 mg/dL    CREATININE 0.8 0.4 - 1.2 mg/dL    Calcium 9.3 8.5 - 10.5 mg/dL   Brain Natriuretic Peptide    Collection Time: 05/16/19 10:52 PM   Result Value Ref Range    Pro-BNP 1379.0 (H) 0.0 - 900.0 pg/mL   CBC Auto Differential    Collection Time: 05/16/19 10:52 PM   Result Value Ref Range    WBC 6.3 4.8 - 10.8 thou/mm3    RBC 4.90 4.20 - 5.40 mill/mm3    Hemoglobin 14.8 12.0 - 16.0 gm/dl    Hematocrit 45.0 37.0 - 47.0 %    MCV 91.8 81.0 - 99.0 fL    MCH 30.2 26.0 - 33.0 pg    MCHC 32.9 32.2 - 35.5 gm/dl    RDW-CV 12.7 11.5 - 14.5 %    RDW-SD 42.4 35.0 - 45.0 fL    Platelets 655 302 - 869 thou/mm3    MPV 9.5 9.4 - 12.4 fL    Seg Neutrophils 59.0 %    Lymphocytes 30.1 %    Monocytes 6.1 %    Eosinophils 4.0 %    Basophils 0.6 %    Immature Granulocytes 0.2 %    Segs Absolute 3.7 1.8 - 7.7 thou/mm3    Lymphocytes # 1.9 1.0 - 4.8 thou/mm3    Monocytes # 0.4 0.4 - 1.3 thou/mm3    Eosinophils # 0.3 0.0 - 0.4 thou/mm3    Basophils # 0.0 0.0 - 0.1 thou/mm3    Immature Grans (Abs) 0.01 0.00 - 0.07 thou/mm3    nRBC 0 /100 wbc   Troponin    Collection Time: 05/16/19 10:52 PM   Result Value Ref Range    Troponin T < 0.010 ng/ml   Anion Gap    Collection Time: 05/16/19 10:52 PM   Result Value Ref Range    Anion Gap 16.0 8.0 - 16.0 meq/L   Osmolality    Collection Time: 05/16/19 10:52 PM   Result Value Ref Range    Osmolality Calc 287.4 275.0 - 300 mOsmol/kg   Glomerular Filtration Rate, Estimated    Collection Time: 05/16/19 10:52 PM   Result Value Ref Range    Est, Glom Filt Rate 70 (A) ml/min/1.73m2       Radiology:     Cta Chest W Wo Contrast    Result less likely. **This report has been created using voice recognition software. It may contain minor errors which are inherent in voice recognition technology. ** Final report electronically signed by Dr. Shruti Conner on 5/17/2019 12:51 AM    Xr Chest Portable    Result Date: 5/16/2019  PROCEDURE: XR CHEST PORTABLE CLINICAL INFORMATION: SOB. COMPARISON: Chest x-ray dated 7/27/2018 TECHNIQUE: AP Portable chest xray FINDINGS: Lungs/pleura: There is central pulmonary venous congestion and mild interstitial infiltrates likely representing interstitial pulmonary edema. An atypical viral-type pneumonia is felt to be less likely. There is mild patchy confluence at the right base. No pleural effusion. No pneumothorax. Heart: There is mild cardiomegaly. Mediastinum/gely: No obvious mass or adenopathy. Skeleton: There is S-shaped scoliosis of the spine. Lines/tubes/devices: none     Mild interstitial infiltrates with patchy confluence at the right base likely representing pulmonary edema, less likely an atypical pneumonia. **This report has been created using voice recognition software. It may contain minor errors which are inherent in voice recognition technology. ** Final report electronically signed by Dr. Shruti Conner on 5/16/2019 11:33 PM        EKG: normal sinus rhythm, no blocks or conduction defects, no ischemic changes    Impression:  Principal Problem:    Pulmonary edema, acute ([address]  Active Problems:    Hypertensive heart disease with congestive heart failure ([address]  Resolved Problems:    * No resolved hospital problems. *      Assessment & Plan:    1. Acute Respiratory Failure  - SaO2 was 88% on admission; improved with supplemental oxygen  - monitor closely    2. Acute Pulmonary Edema  - diurese as needed  - monitor and replace electrolytes as needed    3.  Hypertensive Heart Disease  - pt admitted to missing her medications  - her BP measurements at home are also elevated  - she will likely need adjustments prior to discharge    DVT prophylaxis: [x] Lovenox                                 [] SCDs                                 [] SQ Heparin                                 [] Encourage ambulation, low risk for DVT, no chemical or mechanical prophylaxis necessary              [] Already on Anticoagulation                Anticipated Disposition upon discharge: [x] Home                                                                         [] Home with Home Health                                                                         [] [facility] Assisted Living facility                                                                         [] [facility] [address]          Electronically signed by Bernabe Guerrero MD on 5/17/2019 at 9:22 AM

## 2019-05-17 NOTE — PLAN OF CARE
Problem: Falls - Risk of:  Goal: Will remain free from falls  Description  Will remain free from falls  Outcome: Ongoing  Note:   Call light within reach, bed in lowest position, non skid footwear on, room door open, bed alarm on. Pt using call light appropriately. Problem: Cardiac:  Goal: Ability to maintain an adequate cardiac output will improve  Description  Ability to maintain an adequate cardiac output will improve  Outcome: Ongoing  Note:   Pt denies chest pain.  NSR. Pt had ECHO today and educated on cardiac diet      Problem: Fluid Volume:  Goal: Risk for excess fluid volume will decrease  Description  Risk for excess fluid volume will decrease  Outcome: Ongoing  Note:   Pt states swelling in lower legs is 'better' than on admission. Monitoring I&O. IV lasix continues. Problem: Respiratory:  Goal: Respiratory status will improve  Description  Respiratory status will improve  Outcome: Ongoing  Note:   Currently denies shortness of breath, states she feels \"better\". On room air. 94% on room air. Problem: Discharge Planning  Goal: Knowledge of discharge instructions  Description  Knowledge of discharge instructions     Outcome: Ongoing  Note:   Patient plans to return home with spouse, denies home needs at this time. Care plan reviewed with patient. Patient verbalize understanding of the plan of care and contribute to goal setting.

## 2019-05-17 NOTE — ED NOTES
Bed: 012A  Expected date: 5/16/19  Expected time:   Means of arrival: [facility]  Comments:      Neelima Clark RN  05/16/19 [id number]

## 2019-05-17 NOTE — ED PROVIDER NOTES
[facility]  Pt Name: Samuel Wing  MRN: [id number]  [place] 1945  Date of evaluation: 5/16/2019  Provider: LUKAS Wesley CNP    CHIEF COMPLAINT       Chief Complaint   Patient presents with    Shortness of Breath       Nurses Notes reviewed and I agree except as noted in the HPI. HISTORY OF PRESENT ILLNESS    Samuel Wing is a 68 y.o. female whopresents to the emergency department from home by EMS. The patient has a history of CAD, hypertension, GERD, and asthma. Patient has established care with Dr. Edel Sexton (Cardiology). Patient has been spending the past few days here visiting her . She was here this evening walking around to find a vending with exertional shortness of breath. Patient decided to go home and rest, but once she did so, this shortness of breath continued. She called for a friend who then called for EMS. Upon arrival here, patient is sating at 88% on RA. She denies chest pain. She has a cough as well which is nonproductive. Her last heart catheterization was 3 years ago. Patient denies fever, chills, nausea, vomiting, or abdominal pain. Friend remains at bedside. There are no other complaints or symptoms. Triage notes and Nursing notes were reviewed by myself. Any discrepancies are addressed above. REVIEW OF SYSTEMS     Review of Systems   Constitutional: Negative for chills and fever. Respiratory: Positive for cough and shortness of breath. Cardiovascular: Negative for chest pain. Gastrointestinal: Negative for abdominal pain, nausea and vomiting. All pertinent systems were reviewed and are negative unless indicated in the HPI.     PAST MEDICAL HISTORY    has a past medical history of Asthma, mild intermittent, CAD (coronary artery disease), Chronic low back pain, Controlled substance agreement signed; tramadol, Dyslipidemia, LYLA (generalized anxiety disorder), History of gastroesophageal reflux (GERD), [facility] Hypertension, Left bundle branch block, Obesity (BMI 30-39.9), SUNI on CPAP, Osteoarthritis, Osteopenia, Seasonal allergies, and Vitamin D deficiency. SURGICAL HISTORY      has a past surgical history that includes sinus surgery (1990); Rotator cuff repair (Bilateral, 2006  & 2009); Carpal tunnel release (08/2006); Rotator cuff repair (11/2009); Cardiac catheterization (10/30/14); eye surgery (Left, 04/23/2016); eye surgery (Right, 05/24/2006); Carpal tunnel release (Left, 2009); and Colonoscopy (2007). CURRENT MEDICATIONS       Current Discharge Medication List      CONTINUE these medications which have NOT CHANGED    Details   traMADol (ULTRAM) 50 MG tablet Take 1 tablet by mouth 2 times daily as needed for Pain for up to 90 days. Qty: 60 tablet, Refills: 2    Comments: Reduce doses taken as pain becomes manageable  Associated Diagnoses: Primary osteoarthritis involving multiple joints; Controlled substance agreement signed      Cholecalciferol (VITAMIN D3) 2000 units CAPS Take by mouth daily      DHEA 25 MG TABS Take by mouth daily      meloxicam (MOBIC) 15 MG tablet Take 15 mg by mouth daily      cetirizine (ZYRTEC) 10 MG tablet Take 10 mg by mouth daily as needed for Allergies      Lactobacillus-Inulin ([address]) CAPS Take 1 capsule by mouth daily       Magnesium Oxide 500 MG CAPS Take by mouth daily For more movements      nitroGLYCERIN (NITROSTAT) 0.4 MG SL tablet Place 0.4 mg under the tongue every 5 minutes as needed for Chest pain. albuterol-ipratropium (COMBIVENT RESPIMAT)  MCG/ACT AERS inhaler Inhale 1 puff into the lungs every 6 hours as needed for Wheezing. aspirin 81 MG tablet Take 81 mg by mouth daily. b complex vitamins capsule Take 1 capsule by mouth daily B-Right by Aydee L[facility]pez at [facility]  before and as finish breakfast and before supper      isosorbide mononitrate (IMDUR) 30 MG CR tablet Take 30 mg by mouth daily.       diltiazem (CARDIZEM SR) 120 MG SR capsule Take 120 mg by mouth daily. pravastatin (PRAVACHOL) 40 MG tablet Take 40 mg by mouth daily. fluticasone (FLONASE) 50 MCG/ACT nasal spray 2 sprays by Nasal route daily. Apply daily to each nare  Qty: 1 Bottle, Refills: 0    Associated Diagnoses: Acute sinusitis      Omega-3 1400 MG CAPS Take 1 capsule by mouth daily [facility] #[id number]  before and as finish breakfast and supper      Cinnamon 500 MG CAPS Take 3 capsules by mouth daily before and as finish breakfast and supper    Associated Diagnoses: Back pain; Snoring; Anxiety; IgG Gliadin antibody positive      sertraline (ZOLOFT) 50 MG tablet Take 50 mg by mouth daily. ALLERGIES     is allergic to duricef [cefadroxil]. FAMILY HISTORY     indicated that her mother is . She indicated that her father is . She indicated that both of her brothers are alive. She indicated that the status of her maternal grandmother is unknown.   family history includes Cancer in her brother and father; Dementia in her brother; Diabetes in her maternal grandmother; Glaucoma in her brother; Heart Disease in her maternal grandmother and mother. SOCIAL HISTORY      reports that she is a non-smoker but has been exposed to tobacco smoke. She has never used smokeless tobacco. She reports that she does not drink alcohol or use drugs. PHYSICAL EXAM     INITIAL VITALS:  height is 5' 4\" (1.626 m) and weight is 220 lb (99.8 kg). Her oral temperature is 97.8 °F (36.6 °C). Her blood pressure is 197/87 (abnormal) and her pulse is 94. Her respiration is 18 and oxygen saturation is 94%. Physical Exam   Constitutional: She is oriented to person, place, and time. She appears well-developed and well-nourished. Nasal cannula in place. HENT:   Head: Normocephalic and atraumatic. Eyes: Conjunctivae are normal.   Neck: Normal range of motion. Cardiovascular: Normal rate and regular rhythm. Pulmonary/Chest: She has rales in the left lower field. Musculoskeletal: Normal range of motion. Neurological: She is alert and oriented to person, place, and time. Skin: Skin is warm and dry. DIFFERENTIAL DIAGNOSIS:   Including but not limited to CHF, COPD, Pulmonary edema. DIAGNOSTIC RESULTS     EKG: AllEKG's are interpreted by the Emergency Department Physician who either signs or Co-signs this chart in the absence of a cardiologist.    Catalina Mejia. Rate: 92 bpm  NY interval: 174 ms  QRS duration: 172 ms  QTc: 531 ms  P-R-T axes: -5, 23, 127  Normal sinus rhythm. Left bundle branch block. Abnormal ECG. No STEMI  Compared to old EKG on 27-Jul-2018    RADIOLOGY: non-plain film images(s) such as CT, Ultrasound and MRI are read by the radiologist.  Plain radiographic images are visualized and preliminarily interpreted by the emergencyphysician unless otherwise stated below. CTA Chest W WO Contrast   Final Result      No acute pulmonary embolism. Bilateral multilobar patchy groundglass infiltrates with interlobular septal thickening probably representing pulmonary edema. Atypical pneumonia felt to be less likely. **This report has been created using voice recognition software. It may contain minor errors which are inherent in voice recognition technology. **      Final report electronically signed by Dr. Jerica Hernandez on 5/17/2019 12:51 AM      XR CHEST PORTABLE   Final Result      Mild interstitial infiltrates with patchy confluence at the right base likely representing pulmonary edema, less likely an atypical pneumonia. **This report has been created using voice recognition software. It may contain minor errors which are inherent in voice recognition technology. **      Final report electronically signed by Dr. Jerica Hernandez on 5/16/2019 11:33 PM            LABS:   Labs Reviewed   BASIC METABOLIC PANEL W/ REFLEX TO MG FOR LOW K - Abnormal; Notable for the following components:       Result Value    CO2 19 (*)     Glucose 110 (*)     BUN 23 (*) All other components within normal limits   BRAIN NATRIURETIC PEPTIDE - Abnormal; Notable for the following components:    Pro-BNP 1379.0 (*)     All other components within normal limits   GLOMERULAR FILTRATION RATE, ESTIMATED - Abnormal; Notable for the following components:    Est, Glom Filt Rate 70 (*)     All other components within normal limits   CBC WITH AUTO DIFFERENTIAL   TROPONIN   ANION GAP   OSMOLALITY         EMERGENCYDEPARTMENT COURSE AND MEDICAL DECISION MAKING:   Vitals:    Vitals:    05/16/19 2214 05/16/19 2215 05/17/19 0027 05/17/19 0351   BP: (!) 158/83  (!) 160/84 (!) 197/87   Pulse: 96  86 94   Resp: 16 19 18   Temp: 98.3 °F (36.8 °C)   97.8 °F (36.6 °C)   TempSrc: Oral   Oral   SpO2: (!) 88% 96% 97% 94%   Weight: 215 lb (97.5 kg)   220 lb (99.8 kg)   Height:    5' 4\" (1.626 m)         Pertinent Labs & Imaging studies reviewed. (See chart for details)           Controlled Substances Monitoring:     RX Monitoring 5/2/2019   Attestation The Prescription Monitoring Report for this patient was reviewed today. Chronic Pain Routine Monitoring Possible medication side effects, risk of tolerance/dependence & alternative treatments discussed. ;No signs of potential drug abuse or diversion identified: otherwise, see note documentation   Chronic Pain > 80 MEDD Obtained or confirmed a written medication contract was on file. The patient was seen and evaluated in a timely manner for exertional shortness of breath with a history of CAD. Patient arrived here sating at 88% on RA. This improved with NC. Vital signs are otherwise stable. ECG shows a normal sinus rhythm. During the physical exam I noted crackles in the left, lower base. I ordered appropriate labs and imaging studies and results were reviewed and discussed with the patient. Labs show an elevated BNP. Chest xray shows pulmonary edema. Within the department, the patient was treated with lasix with some improvement.   I discussed the case with Dr. Esteban Castro who requested that the patient be admitted to Dr. Radha Palencia for further workup and evaluation. Dr. Radha Palencia agrees to accept the patient. Strict return precautions and follow up instructions were discussed with the patient with which the patient agrees     Physical assessment findings, diagnostic testing(s) if applicable, and vital signs reviewed with patient/patient representative. Questions answered. Medications asdirected, including OTC medications for supportive care. Education provided on medications. Differential diagnosis(s) discussed with patient/patient representative. Home care/self care instructions reviewed withpatient/patient representative. Patient is to follow-up with family care provider in 2-3 days if no improvement. Patient is to go to the emergency department if symptoms worsen. Patient/patient representative isaware of care plan, questions answered, verbalizes understanding and is in agreement. ED Medications administered this visit:   Medications   sodium chloride flush 0.9 % injection 10 mL (has no administration in time range)   sodium chloride flush 0.9 % injection 10 mL (has no administration in time range)   acetaminophen (TYLENOL) tablet 650 mg (has no administration in time range)   enoxaparin (LOVENOX) injection 40 mg (has no administration in time range)   iopamidol (ISOVUE-370) 76 % injection 80 mL (80 mLs Intravenous Given 5/16/19 6346)   furosemide (LASIX) injection 40 mg (40 mg Intravenous Given 5/17/19 0146)           I have given the patient strict written and verbal instructions about care at home,follow-up, and signs and symptoms of worsening of condition and they did verbalize understanding. Patient was seen independently by myself. The Patient's final impression and disposition and plan was determined by myself. CRITICAL CARE:   none    CONSULTS:  None    PROCEDURES:  None    FINAL IMPRESSION      1.  Acute

## 2019-05-18 PROCEDURE — 6370000000 HC RX 637 (ALT 250 FOR IP): Performed by: INTERNAL MEDICINE

## 2019-05-18 PROCEDURE — 6360000002 HC RX W HCPCS: Performed by: INTERNAL MEDICINE

## 2019-05-18 PROCEDURE — 2060000000 HC ICU INTERMEDIATE R&B

## 2019-05-18 PROCEDURE — 2580000003 HC RX 258: Performed by: INTERNAL MEDICINE

## 2019-05-18 RX ORDER — LOSARTAN POTASSIUM 25 MG/1
25 TABLET ORAL DAILY
Status: DISCONTINUED | OUTPATIENT
Start: 2019-05-18 | End: 2019-05-20

## 2019-05-18 RX ADMIN — FUROSEMIDE 20 MG: 10 INJECTION, SOLUTION INTRAMUSCULAR; INTRAVENOUS at 08:58

## 2019-05-18 RX ADMIN — TRAMADOL HYDROCHLORIDE 50 MG: 50 TABLET, FILM COATED ORAL at 21:59

## 2019-05-18 RX ADMIN — ASPIRIN 81 MG: 81 TABLET, COATED ORAL at 08:57

## 2019-05-18 RX ADMIN — DILTIAZEM HYDROCHLORIDE 120 MG: 60 CAPSULE, EXTENDED RELEASE ORAL at 09:00

## 2019-05-18 RX ADMIN — FUROSEMIDE 20 MG: 10 INJECTION, SOLUTION INTRAMUSCULAR; INTRAVENOUS at 18:09

## 2019-05-18 RX ADMIN — ISOSORBIDE MONONITRATE 30 MG: 30 TABLET ORAL at 08:56

## 2019-05-18 RX ADMIN — PRAVASTATIN SODIUM 40 MG: 40 TABLET ORAL at 20:58

## 2019-05-18 RX ADMIN — Medication 10 ML: at 08:57

## 2019-05-18 RX ADMIN — CARVEDILOL 3.12 MG: 3.12 TABLET, FILM COATED ORAL at 18:09

## 2019-05-18 RX ADMIN — ENOXAPARIN SODIUM 40 MG: 40 INJECTION SUBCUTANEOUS at 08:57

## 2019-05-18 RX ADMIN — SERTRALINE 50 MG: 50 TABLET, FILM COATED ORAL at 08:56

## 2019-05-18 RX ADMIN — CARVEDILOL 3.12 MG: 3.12 TABLET, FILM COATED ORAL at 08:56

## 2019-05-18 ASSESSMENT — PAIN SCALES - GENERAL
PAINLEVEL_OUTOF10: 2
PAINLEVEL_OUTOF10: 2
PAINLEVEL_OUTOF10: 0
PAINLEVEL_OUTOF10: 0

## 2019-05-18 NOTE — PLAN OF CARE
Problem: Falls - Risk of:  Goal: Will remain free from falls  Description  Will remain free from falls  5/18/2019 1838 by Shira Miller RN  Outcome: Met This Shift  Note:   Up ad cristino in room. Gait is steady. Instructed pt on slow position changes and to call out immediately if any dizziness occurs. Problem: Cardiac:  Goal: Ability to maintain an adequate cardiac output will improve  Description  Ability to maintain an adequate cardiac output will improve  5/18/2019 1838 by Shira Miller RN  Outcome: Ongoing  Note:   Continues on IV lasix. Measuring urine output     Problem: Fluid Volume:  Goal: Risk for excess fluid volume will decrease  Description  Risk for excess fluid volume will decrease  5/18/2019 1838 by Shira Miller RN  Outcome: Ongoing     Problem: Respiratory:  Goal: Respiratory status will improve  Description  Respiratory status will improve  5/18/2019 1838 by Shira Miller RN  Outcome: Completed  Note:   Sating well on room air. Pt ambulating in [place] with no shortness of breath noted. Problem: Discharge Planning  Goal: Knowledge of discharge instructions  Description  Knowledge of discharge instructions     5/18/2019 1838 by Shira Miller RN  Outcome: Ongoing  Note:   Pt plans to return home with spouse at discharge    Care plan reviewed with patient and family. Patient and family verbalize understanding of the plan of care and contribute to goal setting.

## 2019-05-18 NOTE — PROGRESS NOTES
IM Progress Note  Dr. Tru Benitez for Dr Mullins Mom  5/18/2019 7:52 AM      Patient name Fidel Torres  [id number]  PCP: Neema Reeves MD  Admit Date: 5/16/2019  Acct No. [de-identified]    Subjective: Interval History:   Pt lying in bed  Breathing better  No cp      Diet: DIET CARDIAC;    I/O last 3 completed shifts: In: 1030 [P.O.:1020; I.V.:10]  Out: 675 [Urine:675]  No intake/output data recorded. Admission weight: 215 lb (97.5 kg) as of 5/16/2019 10:02 PM  Wt Readings from Last 3 Encounters:   05/18/19 218 lb 4 oz (99 kg)   03/27/19 221 lb 12.8 oz (100.6 kg)   11/20/18 202 lb (91.6 kg)     Body mass index is 37.46 kg/m². ROS   CVS;  no cp or palpitation  Resp: SOB better  Neuro:  No numbness or weakness or dizziness  Abd: no nausea or vomiting or abd pain      Medications:   Scheduled Meds:   sodium chloride flush  10 mL Intravenous 2 times per day    enoxaparin  40 mg Subcutaneous Daily    aspirin  81 mg Oral Daily    diltiazem  120 mg Oral Daily    fluticasone  2 spray Nasal Daily    isosorbide mononitrate  30 mg Oral Daily    pravastatin  40 mg Oral Nightly    sertraline  50 mg Oral Daily    furosemide  20 mg Intravenous BID    carvedilol  3.125 mg Oral BID WC     Continuous Infusions:    Labs :     CBC:   Recent Labs     05/16/19  2252   WBC 6.3   HGB 14.8        BMP:    Recent Labs     05/16/19  2252 05/17/19  1250 05/17/19  2228     --  137   K 4.1 3.8 3.8     --  99   CO2 19*  --  22*   BUN 23*  --  27*   CREATININE 0.8  --  0.8   GLUCOSE 110*  --  104     Hepatic: No results for input(s): AST, ALT, ALB, BILITOT, ALKPHOS in the last 72 hours. Troponin: No results for input(s): TROPONINI in the last 72 hours. BNP: No results for input(s): BNP in the last 72 hours. Lipids: No results for input(s): CHOL, HDL in the last 72 hours. Invalid input(s): LDLCALCU  INR: No results for input(s): INR in the last 72 hours.     Radiology    Objective:   Vitals: /60

## 2019-05-18 NOTE — PLAN OF CARE
Problem: Falls - Risk of:  Goal: Will remain free from falls  Description  Will remain free from falls  5/18/2019 0630 by Amaury Lynch RN  Outcome: Ongoing  Note:   Bed in lowest position and locked. Bed alarm activated. Educated on use of call light when in need of assistance- expressed understanding. Visual checks performed and charted. No falls during shift at this time. Armband and falling star in place. Call light within reach. Transfers independently after set up. Does not use assistive devices. Problem: Cardiac:  Goal: Ability to maintain an adequate cardiac output will improve  Description  Ability to maintain an adequate cardiac output will improve  Outcome: Ongoing  Note:   Vitals:    05/18/19 0415   BP: 131/60   Pulse: 71   Resp: 18   Temp: 98 °F (36.7 °C)   SpO2: 95%     Continuing to monitor VS q4hr and PRN. Echo showed EF of 25-30%. Problem: Respiratory:  Goal: Respiratory status will improve  Description  Respiratory status will improve  Outcome: Ongoing  Note:   SOB is much improved. Patient tolerates ambulation much better. Room air. Problem: Discharge Planning  Goal: Knowledge of discharge instructions  Description  Knowledge of discharge instructions     Outcome: Ongoing  Note:   Patient plans to be discharged home with her . Discharge planning remains in progress at this time. Problem: Fluid Volume:  Goal: Risk for excess fluid volume will decrease  Description  Risk for excess fluid volume will decrease  Note:   Patient has +2 edema in BLE. Patient was started on IV lasix BID. Lung sounds clear throughout.

## 2019-05-19 LAB
ANION GAP SERPL CALCULATED.3IONS-SCNC: 14 MEQ/L (ref 8–16)
BUN BLDV-MCNC: 35 MG/DL (ref 7–22)
CALCIUM SERPL-MCNC: 9.1 MG/DL (ref 8.5–10.5)
CHLORIDE BLD-SCNC: 103 MEQ/L (ref 98–111)
CO2: 22 MEQ/L (ref 23–33)
CREAT SERPL-MCNC: 0.9 MG/DL (ref 0.4–1.2)
GFR SERPL CREATININE-BSD FRML MDRD: 61 ML/MIN/1.73M2
GLUCOSE BLD-MCNC: 105 MG/DL (ref 70–108)
MAGNESIUM: 2.2 MG/DL (ref 1.6–2.4)
POTASSIUM SERPL-SCNC: 3.7 MEQ/L (ref 3.5–5.2)
SODIUM BLD-SCNC: 139 MEQ/L (ref 135–145)

## 2019-05-19 PROCEDURE — 6370000000 HC RX 637 (ALT 250 FOR IP): Performed by: INTERNAL MEDICINE

## 2019-05-19 PROCEDURE — 36415 COLL VENOUS BLD VENIPUNCTURE: CPT

## 2019-05-19 PROCEDURE — 2060000000 HC ICU INTERMEDIATE R&B

## 2019-05-19 PROCEDURE — 2580000003 HC RX 258: Performed by: INTERNAL MEDICINE

## 2019-05-19 PROCEDURE — 2709999900 HC NON-CHARGEABLE SUPPLY

## 2019-05-19 PROCEDURE — 6360000002 HC RX W HCPCS

## 2019-05-19 PROCEDURE — 80048 BASIC METABOLIC PNL TOTAL CA: CPT

## 2019-05-19 PROCEDURE — 6360000002 HC RX W HCPCS: Performed by: INTERNAL MEDICINE

## 2019-05-19 PROCEDURE — 83735 ASSAY OF MAGNESIUM: CPT

## 2019-05-19 RX ORDER — ACETYLCYSTEINE 200 MG/ML
600 SOLUTION ORAL; RESPIRATORY (INHALATION) 2 TIMES DAILY
Status: DISCONTINUED | OUTPATIENT
Start: 2019-05-19 | End: 2019-05-20 | Stop reason: CLARIF

## 2019-05-19 RX ORDER — FUROSEMIDE 10 MG/ML
INJECTION INTRAMUSCULAR; INTRAVENOUS
Status: COMPLETED
Start: 2019-05-19 | End: 2019-05-19

## 2019-05-19 RX ORDER — CARVEDILOL 6.25 MG/1
6.25 TABLET ORAL 2 TIMES DAILY WITH MEALS
Status: DISCONTINUED | OUTPATIENT
Start: 2019-05-19 | End: 2019-05-20

## 2019-05-19 RX ADMIN — ISOSORBIDE MONONITRATE 30 MG: 30 TABLET ORAL at 09:16

## 2019-05-19 RX ADMIN — SERTRALINE 50 MG: 50 TABLET, FILM COATED ORAL at 09:16

## 2019-05-19 RX ADMIN — ACETYLCYSTEINE 600 MG: 200 SOLUTION ORAL; RESPIRATORY (INHALATION) at 22:10

## 2019-05-19 RX ADMIN — TRAMADOL HYDROCHLORIDE 50 MG: 50 TABLET, FILM COATED ORAL at 22:10

## 2019-05-19 RX ADMIN — FUROSEMIDE 20 MG: 10 INJECTION, SOLUTION INTRAMUSCULAR; INTRAVENOUS at 09:18

## 2019-05-19 RX ADMIN — PRAVASTATIN SODIUM 40 MG: 40 TABLET ORAL at 22:10

## 2019-05-19 RX ADMIN — ASPIRIN 81 MG: 81 TABLET, COATED ORAL at 09:16

## 2019-05-19 RX ADMIN — DILTIAZEM HYDROCHLORIDE 120 MG: 60 CAPSULE, EXTENDED RELEASE ORAL at 09:16

## 2019-05-19 RX ADMIN — CARVEDILOL 6.25 MG: 6.25 TABLET, FILM COATED ORAL at 18:08

## 2019-05-19 RX ADMIN — Medication 10 ML: at 22:10

## 2019-05-19 RX ADMIN — Medication 10 ML: at 09:17

## 2019-05-19 RX ADMIN — CARVEDILOL 3.12 MG: 3.12 TABLET, FILM COATED ORAL at 09:16

## 2019-05-19 RX ADMIN — ENOXAPARIN SODIUM 40 MG: 40 INJECTION SUBCUTANEOUS at 09:16

## 2019-05-19 ASSESSMENT — PAIN SCALES - GENERAL
PAINLEVEL_OUTOF10: 2
PAINLEVEL_OUTOF10: 2
PAINLEVEL_OUTOF10: 0
PAINLEVEL_OUTOF10: 2

## 2019-05-19 ASSESSMENT — PAIN DESCRIPTION - LOCATION
LOCATION: HIP;SHOULDER
LOCATION: SHOULDER;HIP

## 2019-05-19 ASSESSMENT — PAIN DESCRIPTION - PROGRESSION
CLINICAL_PROGRESSION: NOT CHANGED
CLINICAL_PROGRESSION: NOT CHANGED

## 2019-05-19 ASSESSMENT — PAIN DESCRIPTION - ORIENTATION
ORIENTATION: RIGHT;LEFT
ORIENTATION: RIGHT;LEFT

## 2019-05-19 ASSESSMENT — PAIN - FUNCTIONAL ASSESSMENT
PAIN_FUNCTIONAL_ASSESSMENT: ACTIVITIES ARE NOT PREVENTED
PAIN_FUNCTIONAL_ASSESSMENT: ACTIVITIES ARE NOT PREVENTED

## 2019-05-19 ASSESSMENT — PAIN DESCRIPTION - DESCRIPTORS
DESCRIPTORS: ACHING
DESCRIPTORS: ACHING

## 2019-05-19 ASSESSMENT — PAIN DESCRIPTION - ONSET
ONSET: ON-GOING
ONSET: ON-GOING

## 2019-05-19 ASSESSMENT — PAIN DESCRIPTION - PAIN TYPE
TYPE: ACUTE PAIN
TYPE: ACUTE PAIN

## 2019-05-19 ASSESSMENT — PAIN DESCRIPTION - FREQUENCY
FREQUENCY: INTERMITTENT
FREQUENCY: INTERMITTENT

## 2019-05-19 NOTE — PLAN OF CARE
Problem: Falls - Risk of:  Goal: Will remain free from falls  Description  Will remain free from falls  5/19/2019 1448 by Charmain Mohs, RN  Outcome: Ongoing  Note:   Pt is up ad cristino. Gait is steady. Ambulated around the unit, slightly short of breath after walking     Problem: Cardiac:  Goal: Ability to maintain an adequate cardiac output will improve  Description  Ability to maintain an adequate cardiac output will improve  Outcome: Ongoing  Note:   Vitals:    05/19/19 1237   BP: (!) 107/54   Pulse: 72   Resp: 18   Temp: 97.9 °F (36.6 °C)   SpO2: 95%        Problem: Fluid Volume:  Goal: Risk for excess fluid volume will decrease  Description  Risk for excess fluid volume will decrease  5/19/2019 1448 by Charmain Mohs, RN  Outcome: Met This Shift     Problem: Discharge Planning  Goal: Knowledge of discharge instructions  Description  Knowledge of discharge instructions     Outcome: Ongoing  Note:   Reviewed importance of weights and monitoring blood pressure for changes at home     Problem: Pain:  Description  Pain management should include both nonpharmacologic and pharmacologic interventions. Goal: Pain level will decrease  Description  Pain level will decrease  Outcome: Met This Shift  Note:   Denies pain   Care plan reviewed with patient and family. Patient and family verbalize understanding of the plan of care and contribute to goal setting.

## 2019-05-19 NOTE — PROGRESS NOTES
IM Progress Note  Dr. Ricci Apt for Dr Ritika Golden  5/19/2019 7:41 AM      Patient name Samuel Wing  [id number]  PCP: Dwaine Davison MD  Admit Date: 5/16/2019  Acct No. [de-identified]    Subjective: Interval History:   Breathing not worse      Diet: DIET CARDIAC;    I/O last 3 completed shifts: In: 760 [P.O.:760]  Out: 800 [Urine:800]  No intake/output data recorded. Admission weight: 215 lb (97.5 kg) as of 5/16/2019 10:02 PM  Wt Readings from Last 3 Encounters:   05/18/19 218 lb 4 oz (99 kg)   03/27/19 221 lb 12.8 oz (100.6 kg)   11/20/18 202 lb (91.6 kg)     Body mass index is 37.46 kg/m². ROS   CVS;  no cp or palpitation  Resp: SOB better  Neuro:  No numbness or weakness or dizziness  Abd: no nausea or vomiting or abd pain      Medications:   Scheduled Meds:   losartan  25 mg Oral Daily    sodium chloride flush  10 mL Intravenous 2 times per day    enoxaparin  40 mg Subcutaneous Daily    aspirin  81 mg Oral Daily    diltiazem  120 mg Oral Daily    fluticasone  2 spray Nasal Daily    isosorbide mononitrate  30 mg Oral Daily    pravastatin  40 mg Oral Nightly    sertraline  50 mg Oral Daily    furosemide  20 mg Intravenous BID    carvedilol  3.125 mg Oral BID WC     Continuous Infusions:    Labs :     CBC:   Recent Labs     05/16/19  2252   WBC 6.3   HGB 14.8        BMP:    Recent Labs     05/16/19  2252 05/17/19  1250 05/17/19  2228 05/19/19  0444     --  137 139   K 4.1 3.8 3.8 3.7     --  99 103   CO2 19*  --  22* 22*   BUN 23*  --  27* 35*   CREATININE 0.8  --  0.8 0.9   GLUCOSE 110*  --  104 105     Hepatic: No results for input(s): AST, ALT, ALB, BILITOT, ALKPHOS in the last 72 hours. Troponin: No results for input(s): TROPONINI in the last 72 hours. BNP: No results for input(s): BNP in the last 72 hours. Lipids: No results for input(s): CHOL, HDL in the last 72 hours.     Invalid input(s): LDLCALCU  INR: No results for input(s): INR in the last 72 hours.     Radiology    Objective:   Vitals: /60   Pulse 69   Temp 97.6 °F (36.4 °C) (Oral)   Resp 18   Ht 5' 4\" (1.626 m)   Wt 218 lb 4 oz (99 kg)   SpO2 93%   BMI 37.46 kg/m²   HEENT: Head:pupils react  Neck: supple  Lungs: improved  air entry bilat  Heart: regular rate and rhythm   Abdomen: soft BS heard   Extremities: warm  edema  Neurologic:  Alert, oriented X3    Impression:   :   Acute CHF sec to systolic dysfunction EF 57-62%  CAD  SUNI  Asthma  hyperlipidemia      Plan:    Cont diuretics, B Blockers and ARB  Check with cardio about  ischemia workup   entresto at discharge if ok with cardio    Arielle Segal MD

## 2019-05-19 NOTE — CONSULTS
[facility]   Sedation/Analgesia History and Physical    Pt Name: Georgianna Boeck  MRN: [id number]  YOB: 1945  Provider Performing Procedure: Bernard Sampson MD  Primary Care Physician: Bernard Sampson MD      Pre-Procedure: {CARDIAC SYMPTOMS:[id number]::\"Chest pain, possible coronary artery disease, acute chf  Consent: I have discussed with the patient and/or the patient representative the indication, alternatives, and the possible risks and/or complications of the planned procedure and the anesthesia methods. The patient and/or representative appear to understand and agree to proceed. Medical History:   has a past medical history of Asthma, mild intermittent, CAD (coronary artery disease), Chronic low back pain, Controlled substance agreement signed; tramadol, Dyslipidemia, LYLA (generalized anxiety disorder), History of gastroesophageal reflux (GERD), Homocysteinemia ([facility] [address] Hypertension, Left bundle branch block, Obesity (BMI 30-39.9), SUNI on CPAP, Osteoarthritis, Osteopenia, Seasonal allergies, and Vitamin D deficiency. .    Surgical History:     has a past surgical history that includes sinus surgery (1990); Rotator cuff repair (Bilateral, 2006  & 2009); Carpal tunnel release (08/2006); Rotator cuff repair (11/2009); Cardiac catheterization (10/30/14); eye surgery (Left, 04/23/2016); eye surgery (Right, 05/24/2006); Carpal tunnel release (Left, 2009); and Colonoscopy (2007). Allergies:    Allergies as of 05/16/2019 - Review Complete 05/16/2019   Allergen Reaction Noted    Duricef [cefadroxil]  04/09/2013       Medications:   Coumadin use last 5 days:  No  Antiplatelet drug therapy use last 5 days:  Yes  Other anticoagulant use last 5 days:  No   carvedilol  6.25 mg Oral BID WC    acetylcysteine  600 mg Oral BID    losartan  25 mg Oral Daily    sodium chloride flush  10 mL Intravenous 2 times per day    enoxaparin  40 mg Subcutaneous Daily    fluticasone  2 spray Nasal Daily differently: 2 sprays by Nasal route daily as needed Apply daily to each nare 12/27/13  Yes LUKAS Brown CNP   Omega-3 1400 MG CAPS Take 1 capsule by mouth daily [facility][id number]  before and as finish breakfast and supper   Yes Historical Provider, MD   Cinnamon 500 MG CAPS Take 3 capsules by mouth daily before and as finish breakfast and supper   Yes Historical Provider, MD   sertraline (ZOLOFT) 50 MG tablet Take 50 mg by mouth daily. Yes Historical Provider, MD       Vital Signs  Vitals:    05/19/19 1727   BP: 130/75   Pulse: 66   Resp: 18   Temp: 97.8 °F (36.6 °C)   SpO2: 96%       Physical:  Heart:  regular rate and rhythm  Lungs:  Clear  Abdomen:  Soft  Mental Status:  Alert and Oriented    Planned Procedure:  Left Heart Cath and Possible Percutaneous Coronary Intervention    Sedation/ Anesthesia Plan: Midazolam and Sublimaze    ASA Classification: Class 3 - A patient with severe systemic disease that limits activity but is not incapacitating    Mallampati Airway Classification: II (soft palate, uvula, fauces visible)    · Pre-procedure diagnostic studies complete and results available. · Previous sedation/anesthesia experiences assessed. · The patient is an appropriate candidate to undergo the planned procedure sedation and anesthesia. (Refer to nursing sedation/analgesia documentation record)  · Formulation and discussion of sedation/procedure plan, risks, and expectations with patient and/or responsible adult completed. · Patient examined immediately prior to the procedure.  (Refer to nursing sedation/analgesia documentation record)    Sabrina Mac MD  Electronically signed 5/19/2019 at 5:35 PM  Patient Name: Ivonne Presley Record Number: [id number]  Date: 5/19/2019   Time: 5:35 PM   Room/Bed: [facility]19/019-

## 2019-05-19 NOTE — PLAN OF CARE
Problem: Falls - Risk of:  Goal: Will remain free from falls  Description  Will remain free from falls  5/19/2019 0051 by Liberty Fuller RN  Outcome: Ongoing  Note:   Patient is up ad cristino in her room and is safe when up alone. Problem: Fluid Volume:  Goal: Risk for excess fluid volume will decrease  Description  Risk for excess fluid volume will decrease  5/19/2019 0051 by Liberty Fuller RN  Outcome: Ongoing  Note:   Patient received IV Lasix BID to help with fluid volume overload    Care plan reviewed with patient. Patient verbalize understanding of the plan of care and contribute to goal setting.

## 2019-05-19 NOTE — PROGRESS NOTES
Spoke with Jadyn Flores with cath lab scheduling. Updated that Dr Robert Fontenot would like to complete a radial approach cath at 0700 tomorrow.

## 2019-05-19 NOTE — PROGRESS NOTES
Pt ambulated in mays around 4a&b at 1100 and 1700. Pt talked during ambulation. Slightly winded after walking.

## 2019-05-20 ENCOUNTER — APPOINTMENT (OUTPATIENT)
Dept: CARDIAC CATH/INVASIVE PROCEDURES | Age: 74
DRG: 286 | End: 2019-05-20
Payer: MEDICARE

## 2019-05-20 PROBLEM — I50.1 HEART FAILURE, LEFT, WITH LVEF <=30% (HCC): Status: ACTIVE | Noted: 2019-05-20

## 2019-05-20 LAB
ALBUMIN SERPL-MCNC: 3.8 G/DL (ref 3.5–5.1)
ALP BLD-CCNC: 63 U/L (ref 38–126)
ALT SERPL-CCNC: 10 U/L (ref 11–66)
ANION GAP SERPL CALCULATED.3IONS-SCNC: 12 MEQ/L (ref 8–16)
AST SERPL-CCNC: 17 U/L (ref 5–40)
BILIRUB SERPL-MCNC: 1 MG/DL (ref 0.3–1.2)
BUN BLDV-MCNC: 28 MG/DL (ref 7–22)
CALCIUM SERPL-MCNC: 8.9 MG/DL (ref 8.5–10.5)
CHLORIDE BLD-SCNC: 106 MEQ/L (ref 98–111)
CHOLESTEROL, TOTAL: 164 MG/DL (ref 100–199)
CO2: 24 MEQ/L (ref 23–33)
CREAT SERPL-MCNC: 0.7 MG/DL (ref 0.4–1.2)
EKG ATRIAL RATE: 69 BPM
EKG P AXIS: 0 DEGREES
EKG P-R INTERVAL: 206 MS
EKG Q-T INTERVAL: 466 MS
EKG QRS DURATION: 172 MS
EKG QTC CALCULATION (BAZETT): 499 MS
EKG R AXIS: 26 DEGREES
EKG T AXIS: 134 DEGREES
EKG VENTRICULAR RATE: 69 BPM
ERYTHROCYTE [DISTWIDTH] IN BLOOD BY AUTOMATED COUNT: 12.9 % (ref 11.5–14.5)
ERYTHROCYTE [DISTWIDTH] IN BLOOD BY AUTOMATED COUNT: 43.6 FL (ref 35–45)
GFR SERPL CREATININE-BSD FRML MDRD: 82 ML/MIN/1.73M2
GLUCOSE BLD-MCNC: 96 MG/DL (ref 70–108)
HCT VFR BLD CALC: 38.8 % (ref 37–47)
HDLC SERPL-MCNC: 55 MG/DL
HEMOGLOBIN: 12.7 GM/DL (ref 12–16)
LDL CHOLESTEROL CALCULATED: 93 MG/DL
MCH RBC QN AUTO: 30.3 PG (ref 26–33)
MCHC RBC AUTO-ENTMCNC: 32.7 GM/DL (ref 32.2–35.5)
MCV RBC AUTO: 92.6 FL (ref 81–99)
PLATELET # BLD: 198 THOU/MM3 (ref 130–400)
PMV BLD AUTO: 10.1 FL (ref 9.4–12.4)
POTASSIUM REFLEX MAGNESIUM: 3.8 MEQ/L (ref 3.5–5.2)
RBC # BLD: 4.19 MILL/MM3 (ref 4.2–5.4)
SODIUM BLD-SCNC: 142 MEQ/L (ref 135–145)
TOTAL PROTEIN: 6.5 G/DL (ref 6.1–8)
TRIGL SERPL-MCNC: 80 MG/DL (ref 0–199)
WBC # BLD: 4.7 THOU/MM3 (ref 4.8–10.8)

## 2019-05-20 PROCEDURE — 6370000000 HC RX 637 (ALT 250 FOR IP): Performed by: INTERNAL MEDICINE

## 2019-05-20 PROCEDURE — 6360000002 HC RX W HCPCS: Performed by: INTERNAL MEDICINE

## 2019-05-20 PROCEDURE — C1769 GUIDE WIRE: HCPCS

## 2019-05-20 PROCEDURE — B2151ZZ FLUOROSCOPY OF LEFT HEART USING LOW OSMOLAR CONTRAST: ICD-10-PCS | Performed by: INTERNAL MEDICINE

## 2019-05-20 PROCEDURE — 85027 COMPLETE CBC AUTOMATED: CPT

## 2019-05-20 PROCEDURE — 6360000004 HC RX CONTRAST MEDICATION: Performed by: INTERNAL MEDICINE

## 2019-05-20 PROCEDURE — B2111ZZ FLUOROSCOPY OF MULTIPLE CORONARY ARTERIES USING LOW OSMOLAR CONTRAST: ICD-10-PCS | Performed by: INTERNAL MEDICINE

## 2019-05-20 PROCEDURE — 2709999900 HC NON-CHARGEABLE SUPPLY

## 2019-05-20 PROCEDURE — 2580000003 HC RX 258: Performed by: INTERNAL MEDICINE

## 2019-05-20 PROCEDURE — 93458 L HRT ARTERY/VENTRICLE ANGIO: CPT | Performed by: INTERNAL MEDICINE

## 2019-05-20 PROCEDURE — 93010 ELECTROCARDIOGRAM REPORT: CPT | Performed by: INTERNAL MEDICINE

## 2019-05-20 PROCEDURE — 6360000002 HC RX W HCPCS

## 2019-05-20 PROCEDURE — 2500000003 HC RX 250 WO HCPCS

## 2019-05-20 PROCEDURE — 80061 LIPID PANEL: CPT

## 2019-05-20 PROCEDURE — 80053 COMPREHEN METABOLIC PANEL: CPT

## 2019-05-20 PROCEDURE — 2060000000 HC ICU INTERMEDIATE R&B

## 2019-05-20 PROCEDURE — 93005 ELECTROCARDIOGRAM TRACING: CPT | Performed by: INTERNAL MEDICINE

## 2019-05-20 PROCEDURE — 4A023N7 MEASUREMENT OF CARDIAC SAMPLING AND PRESSURE, LEFT HEART, PERCUTANEOUS APPROACH: ICD-10-PCS | Performed by: INTERNAL MEDICINE

## 2019-05-20 PROCEDURE — C1894 INTRO/SHEATH, NON-LASER: HCPCS

## 2019-05-20 PROCEDURE — 36415 COLL VENOUS BLD VENIPUNCTURE: CPT

## 2019-05-20 RX ORDER — SODIUM CHLORIDE 9 MG/ML
100 INJECTION, SOLUTION INTRAVENOUS CONTINUOUS
Status: ACTIVE | OUTPATIENT
Start: 2019-05-20 | End: 2019-05-20

## 2019-05-20 RX ORDER — SODIUM CHLORIDE 0.9 % (FLUSH) 0.9 %
10 SYRINGE (ML) INJECTION EVERY 12 HOURS SCHEDULED
Status: DISCONTINUED | OUTPATIENT
Start: 2019-05-20 | End: 2019-05-22 | Stop reason: HOSPADM

## 2019-05-20 RX ORDER — ONDANSETRON 2 MG/ML
4 INJECTION INTRAMUSCULAR; INTRAVENOUS EVERY 6 HOURS PRN
Status: DISCONTINUED | OUTPATIENT
Start: 2019-05-20 | End: 2019-05-22 | Stop reason: HOSPADM

## 2019-05-20 RX ORDER — SODIUM CHLORIDE 0.9 % (FLUSH) 0.9 %
10 SYRINGE (ML) INJECTION EVERY 12 HOURS SCHEDULED
Status: DISCONTINUED | OUTPATIENT
Start: 2019-05-20 | End: 2019-05-20 | Stop reason: SDUPTHER

## 2019-05-20 RX ORDER — CARVEDILOL 6.25 MG/1
6.25 TABLET ORAL 2 TIMES DAILY WITH MEALS
Status: DISCONTINUED | OUTPATIENT
Start: 2019-05-21 | End: 2019-05-22 | Stop reason: HOSPADM

## 2019-05-20 RX ORDER — ATROPINE SULFATE 0.4 MG/ML
0.5 AMPUL (ML) INJECTION
Status: ACTIVE | OUTPATIENT
Start: 2019-05-20 | End: 2019-05-20

## 2019-05-20 RX ORDER — SODIUM CHLORIDE 0.9 % (FLUSH) 0.9 %
10 SYRINGE (ML) INJECTION PRN
Status: DISCONTINUED | OUTPATIENT
Start: 2019-05-20 | End: 2019-05-20 | Stop reason: SDUPTHER

## 2019-05-20 RX ORDER — SODIUM CHLORIDE 9 MG/ML
INJECTION, SOLUTION INTRAVENOUS CONTINUOUS
Status: DISCONTINUED | OUTPATIENT
Start: 2019-05-20 | End: 2019-05-20 | Stop reason: ALTCHOICE

## 2019-05-20 RX ORDER — DIPHENHYDRAMINE HCL 25 MG
50 TABLET ORAL ONCE
Status: DISCONTINUED | OUTPATIENT
Start: 2019-05-20 | End: 2019-05-22 | Stop reason: HOSPADM

## 2019-05-20 RX ORDER — BUMETANIDE 1 MG/1
2 TABLET ORAL DAILY
Status: DISCONTINUED | OUTPATIENT
Start: 2019-05-20 | End: 2019-05-22 | Stop reason: HOSPADM

## 2019-05-20 RX ORDER — ALPRAZOLAM 0.5 MG/1
0.5 TABLET ORAL
Status: ACTIVE | OUTPATIENT
Start: 2019-05-20 | End: 2019-05-20

## 2019-05-20 RX ORDER — ACETAMINOPHEN 325 MG/1
650 TABLET ORAL EVERY 4 HOURS PRN
Status: DISCONTINUED | OUTPATIENT
Start: 2019-05-20 | End: 2019-05-22 | Stop reason: HOSPADM

## 2019-05-20 RX ORDER — DIPHENHYDRAMINE HCL 25 MG
25 TABLET ORAL
Status: ACTIVE | OUTPATIENT
Start: 2019-05-20 | End: 2019-05-20

## 2019-05-20 RX ORDER — NITROGLYCERIN 0.4 MG/1
0.4 TABLET SUBLINGUAL EVERY 5 MIN PRN
Status: DISCONTINUED | OUTPATIENT
Start: 2019-05-20 | End: 2019-05-22 | Stop reason: HOSPADM

## 2019-05-20 RX ORDER — ASPIRIN 325 MG
325 TABLET ORAL ONCE
Status: COMPLETED | OUTPATIENT
Start: 2019-05-20 | End: 2019-05-20

## 2019-05-20 RX ORDER — POTASSIUM CHLORIDE 20 MEQ/1
10 TABLET, EXTENDED RELEASE ORAL 2 TIMES DAILY
Status: DISCONTINUED | OUTPATIENT
Start: 2019-05-20 | End: 2019-05-22 | Stop reason: HOSPADM

## 2019-05-20 RX ORDER — CARVEDILOL 3.12 MG/1
3.12 TABLET ORAL 2 TIMES DAILY WITH MEALS
Status: DISCONTINUED | OUTPATIENT
Start: 2019-05-20 | End: 2019-05-20

## 2019-05-20 RX ORDER — SODIUM CHLORIDE 0.9 % (FLUSH) 0.9 %
10 SYRINGE (ML) INJECTION PRN
Status: DISCONTINUED | OUTPATIENT
Start: 2019-05-20 | End: 2019-05-22 | Stop reason: HOSPADM

## 2019-05-20 RX ADMIN — POTASSIUM CHLORIDE 10 MEQ: 20 TABLET, EXTENDED RELEASE ORAL at 20:12

## 2019-05-20 RX ADMIN — CARVEDILOL 3.12 MG: 3.12 TABLET, FILM COATED ORAL at 16:57

## 2019-05-20 RX ADMIN — ASPIRIN 325 MG: 325 TABLET ORAL at 06:46

## 2019-05-20 RX ADMIN — PRAVASTATIN SODIUM 40 MG: 40 TABLET ORAL at 20:00

## 2019-05-20 RX ADMIN — ACETYLCYSTEINE 600 MG: 200 SOLUTION ORAL; RESPIRATORY (INHALATION) at 12:31

## 2019-05-20 RX ADMIN — Medication 10 ML: at 19:59

## 2019-05-20 RX ADMIN — SERTRALINE 50 MG: 50 TABLET, FILM COATED ORAL at 08:55

## 2019-05-20 RX ADMIN — ENOXAPARIN SODIUM 40 MG: 40 INJECTION SUBCUTANEOUS at 12:32

## 2019-05-20 RX ADMIN — BUMETANIDE 2 MG: 1 TABLET ORAL at 20:00

## 2019-05-20 RX ADMIN — IOPAMIDOL 90 ML: 755 INJECTION, SOLUTION INTRAVENOUS at 07:30

## 2019-05-20 RX ADMIN — SACUBITRIL AND VALSARTAN 1 TABLET: 24; 26 TABLET, FILM COATED ORAL at 20:00

## 2019-05-20 RX ADMIN — ISOSORBIDE MONONITRATE 30 MG: 30 TABLET ORAL at 08:55

## 2019-05-20 RX ADMIN — ACETYLCYSTEINE 500 MG: 500 TABLET, EFFERVESCENT ORAL at 22:11

## 2019-05-20 RX ADMIN — TRAMADOL HYDROCHLORIDE 50 MG: 50 TABLET, FILM COATED ORAL at 22:14

## 2019-05-20 RX ADMIN — CARVEDILOL 6.25 MG: 6.25 TABLET, FILM COATED ORAL at 08:56

## 2019-05-20 RX ADMIN — SODIUM CHLORIDE 100 ML/HR: 9 INJECTION, SOLUTION INTRAVENOUS at 07:45

## 2019-05-20 ASSESSMENT — PAIN SCALES - GENERAL
PAINLEVEL_OUTOF10: 0
PAINLEVEL_OUTOF10: 4

## 2019-05-20 NOTE — PROCEDURES
[address] [place] [address]                            CARDIAC CATHETERIZATION    PATIENT NAME: Juan Pablo Rai                     :        1945  MED REC NO:   [id number]                           ROOM:       0019  ACCOUNT NO:   [de-identified]                           ADMIT DATE: 2019  PROVIDER:     YURI Devlin Lat:  2019    INDICATION FOR PROCEDURE:  This is a patient 68years old lady admitted  to the hospital with chest pain, shortness of breath, new-onset left  ventricular dysfunction. The patient admitted for a heart cath,  possible intervention. She understands the heart cath procedure, the  benefit, the risk, the alternative methods of treatment, possible  complication, and wants it to be done. PROCEDURE PERFORMED:  1.  IV conscious sedation. The patient was given IV conscious sedation  in increment dosages by the circulating cath lab RN, monitored by the  cath lab monitor tech under my supervision. The procedure started at  07:10 and finished at 07:30 a.m. No acute complications from the  procedure. 2.  Left heart cath. The right radial artery cannulated with a 6-Luxembourgish  radial sheath. The patient given nitroglycerin, heparin, verapamil  through the sheath per protocol. The coronary angiogram showed the RCA is a dominant artery and the RCA  was patent. It was about 4 mm in diameter. Left main was patent, bifurcates to the LAD and circumflex. Both the  LAD and circumflex are essentially patent was extreme tortous but no  hemodynamic lesions were seen. The left ventriculogram showed dilated left ventricle with severe  diffuse hypokinesis of the left ventricle and ejection fraction of about  25%. Left ventricular end-diastolic pressure 21. No mitral regurg. No  gradient across the aortic valve. IMPRESSION:  1. Patent tortuous coronary arteries.   2.  Severe systolic dysfunction of the left ventricle. Ejection  fraction about 25%. Severe diffuse hypokinesia of the left ventricle. 3.  No mitral regurg. No gradient across the aortic valve. 4.  Diastolic dysfunction of the left ventricle with left ventricular  end-diastolic pressure of 21 mmHg. The patient will be treated medically with the close monitoring of her  left ventricular function. She will need a LifeVest and she might need  a BiV AICD if she does not improve in her left ventricular function. Thank you for very much for allowing us to share in the management of  this patient. Kendall Lamb M.D.    D: 05/20/2019 7:30:06       T: 05/20/2019 9:28:42     [email or web address]  Job#: [id number]     Doc#: [id number]    CC:  Pardeep Castro M.D.        Referring Service

## 2019-05-20 NOTE — PROGRESS NOTES
Notified Dr. Patrice Pineda of pt's BP drop, asymptomatic, states he will look into pt's medications.

## 2019-05-20 NOTE — CONSULTS
blood pressure is 130/75. She is in sinus  rhythm. She was afebrile. Respiratory rate is 18. NEUROLOGIC:  The patient has no focal neurological deficit. NECK:  She has no JVD. There was no carotid bruit. There was no  thyromegaly. She has no neck lymphadenopathy. HEENT:  No discharge from the throat, ears, or nose. LUNGS:  Showed few basilar crackles. Scattered rhonchi. She had a  positive JVD. HEART:  She has 2/6 systolic murmur. She has an S3.  ABDOMEN:  Soft. EXTREMITIES:  Trace of samantha-ankle edema. LABORATORY DATA:  BUN is 23, creatinine 0.8, potassium is 4.1. Hemoglobin is 14.8. IMPRESSION:  1. The patient with an acute exacerbation of history of congestive  heart failure. 2.  Chest pain. 3.  History of coronary artery disease. 4.  History of hypertension. 5.  History of sleep apnea. 6.  History of back pain. 7.  Overweight. This patient will begin Mucomyst.  I will hold on her diuretics now. The patient, since she has chest pain and significant deterioration of  ejection fraction, heart catheterization will be planned and further  recommendations pending the results of the above tests. We thank you very much for allowing us to share in the management of  this patient. We will follow up with you.         Simeon Rao M.D.    D: 05/19/2019 17:40:38       T: 05/19/2019 20:22:32     [email or web address]  Job#: [id number]     Doc#: [id number]    CC:

## 2019-05-20 NOTE — PLAN OF CARE
Problem: Falls - Risk of:  Goal: Will remain free from falls  Description  Will remain free from falls  Outcome: Ongoing  Note:   Pt remains free of falls this shift, ambulates well independently after setup, appropriately uses call light. Goal: Absence of physical injury  Description  Absence of physical injury  Outcome: Ongoing  Note:   Pt remains free of physical injury this shift, will continue to provide a safe environment. Problem: Cardiac:  Goal: Ability to maintain an adequate cardiac output will improve  Description  Ability to maintain an adequate cardiac output will improve  Outcome: Ongoing  Note:   Pt's HR remains stable, BP dropped after BP meds this morning, dose adjusted, will continue to monitor. Problem: Fluid Volume:  Goal: Risk for excess fluid volume will decrease  Description  Risk for excess fluid volume will decrease  Outcome: Ongoing  Note:   Pt remains fluid balanced this shift, received 20mg lasix this morning during heart cath and IV fluids after to compensate kidney function. Problem: Discharge Planning  Goal: Knowledge of discharge instructions  Description  Knowledge of discharge instructions     Outcome: Ongoing  Note:   Pt verbalizes understanding of post cath discharge instructions, will continue to educate as we near discharge. Problem: Pain:  Goal: Pain level will decrease  Description  Pain level will decrease  Outcome: Ongoing  Note:   Pain Assessment: 0-10  Pain Level: 0   Pain goal:  0   Is pain goal met at this time? Yes     Additional interventions to be implemented: medications Tylenol and Tramadol, position change and rest      Goal: Control of acute pain  Description  Control of acute pain  Outcome: Ongoing  Note:   Pain Assessment: 0-10  Pain Level: 0   Pain goal:  0   Is pain goal met at this time?   Yes     Additional interventions to be implemented: medications Tylenol and Tramadol, position change and rest  Goal: Control of chronic

## 2019-05-20 NOTE — PROGRESS NOTES
[facility]      Progress Note  [facility] 4A       Patient: Donald Gaming  Unit/Bed: 0W-88/526-J  YOB: 1945  MRN: [id number]  Acct: [de-identified]   Admitting Diagnosis: Pulmonary edema, acute ([facility] [address] [J81.0]  Admit Date:  5/16/2019  Hospital Day: 3    Interval History:    Patient is on admission for acute pulmonary edema which has been found to be cardiogenic with reduced LVEF 25% and currently being treated with optimized cardiac medical therapy  The following Services are consulting: cardiology     Chart, Labs, Radiology studies, and Consults reviewed. Subjective:  Patient seen and examined   There are no new complaints today.     Objective:   BP (!) 87/54   Pulse 73   Temp 97.4 °F (36.3 °C) (Oral)   Resp 16   Ht 5' 4\" (1.626 m)   Wt 218 lb (98.9 kg)   SpO2 96%   BMI 37.42 kg/m²       Intake/Output Summary (Last 24 hours) at 5/20/2019 1047  Last data filed at 5/20/2019 0434  Gross per 24 hour   Intake 600 ml   Output 780 ml   Net -180 ml        General appearance: alert, appears stated age and cooperative  Neck: no carotid bruit, no JVD and supple, symmetrical, trachea midline  Lungs: clear to auscultation bilaterally  Abdomen: soft, non-tender; bowel sounds normal; no masses,  no organomegaly  Extremities: extremities normal, atraumatic, no cyanosis or edema  Pulses: 2+ and symmetric  Skin: Skin color, texture, turgor normal. No rashes or lesions  Neurologic: Grossly normal        Hernandez:  Drains:  Central Line/port:   EKG:  Imaging:      Scheduled Meds:   diphenhydrAMINE  50 mg Oral Once    hydrocortisone sodium succinate PF  200 mg Intravenous Once    sodium chloride flush  10 mL Intravenous 2 times per day    carvedilol  6.25 mg Oral BID WC    acetylcysteine  600 mg Oral BID    losartan  25 mg Oral Daily    enoxaparin  40 mg Subcutaneous Daily    fluticasone  2 spray Nasal Daily    isosorbide mononitrate  30 mg Oral Daily    pravastatin  40 mg Oral Nightly    sertraline  50 mg Oral Daily     Continuous Infusions:   sodium chloride 100 mL/hr (05/20/19 0745)     PRN Meds:ALPRAZolam, diphenhydrAMINE, nitroGLYCERIN, sodium chloride flush, acetaminophen, atropine, magnesium hydroxide, ondansetron, traMADol, albuterol sulfate HFA **AND** ipratropium **AND** MDI Treatment    Diet:  DIET CARDIAC;    DVT Prophylaxis:    Data:  CBC:   Recent Labs     05/20/19  0745   WBC 4.7*   RBC 4.19*   HGB 12.7   HCT 38.8   MCV 92.6        BMP:   Recent Labs     05/17/19  2228 05/19/19  0444 05/20/19  0745    139 142   K 3.8 3.7 3.8   CL 99 103 106   CO2 22* 22* 24   BUN 27* 35* 28*   CREATININE 0.8 0.9 0.7     BNP: No results for input(s): BNP in the last 72 hours. PT/INR: No results for input(s): PROTIME, INR in the last 72 hours. APTT: No results for input(s): APTT in the last 72 hours. CARDIAC ENZYMES: No results for input(s): CKMB, CKMBINDEX, TROPONINT in the last 72 hours. Invalid input(s): CKTOTAL;3        Principal Problem:    Pulmonary edema, acute ([address]  Active Problems:    Hypertensive heart disease with congestive heart failure (HCC)    Heart failure, left, with LVEF <=30% (HCC)  Resolved Problems:    * No resolved hospital problems. *      Active Issues    Assessment & Plan:     1. Acute systolic heart failure  - 2/2 ischemic cardiomyopathy; LVEF 25%  - Cardiology evaluation and recommendations acknowledged with thanks  - currently on ACEi, BB and Nitrates    2. Acute Pulmonary Edema  - resolved with diuresis    3.  Hypertensive Heart Disease  - BP is better controlled  - reduced Carvedilol to 3.125 mg bid  - will monitor closely    Electronically signed by Bridger Jeffrey MD on 5/20/2019 at 10:47 AM    Attending Physician

## 2019-05-20 NOTE — PROGRESS NOTES
[address] Order received for LifeVest from Dr. Sara Singletary  [phone] to [facility], from [place], regarding order clarification and list of documents to send  [phone] Faxed order, progress note, heart cath and echo report.

## 2019-05-20 NOTE — CARE COORDINATION
5/20/19, 10:51 AM      [place] Call day: 3  Location: [facility]19/019-A Reason for admit: Pulmonary edema, acute ([address] [J81.0]   Procedure: Left Heart CathIMPRESSION:  1. Patent tortuous coronary arteries. 2.  Severe systolic dysfunction of the left ventricle. Ejection  fraction about 25%. Severe diffuse hypokinesia of the left ventricle. 3.  No mitral regurg. No gradient across the aortic valve. 4.  Diastolic dysfunction of the left ventricle with left ventricular  end-diastolic pressure of 21 mmHg.     The patient will be treated medically with the close monitoring of her  left ventricular function. She will need a LifeVest and she might need  a BiV AICD if she does not improve in her left ventricular function.               Treatment Plan of Care:  Mucomyst po Bid, Possible Entresto and Life Vest at discharge, following. PCP: Felipe Escobar MD  Readmission Risk Score: 11%  Discharge Plan: Plans home with spouse.

## 2019-05-21 ENCOUNTER — TELEPHONE (OUTPATIENT)
Dept: FAMILY MEDICINE CLINIC | Age: 74
End: 2019-05-21

## 2019-05-21 PROCEDURE — 6360000002 HC RX W HCPCS: Performed by: INTERNAL MEDICINE

## 2019-05-21 PROCEDURE — 6370000000 HC RX 637 (ALT 250 FOR IP): Performed by: INTERNAL MEDICINE

## 2019-05-21 PROCEDURE — 2580000003 HC RX 258: Performed by: INTERNAL MEDICINE

## 2019-05-21 PROCEDURE — 2709999900 HC NON-CHARGEABLE SUPPLY

## 2019-05-21 PROCEDURE — 2060000000 HC ICU INTERMEDIATE R&B

## 2019-05-21 RX ORDER — AMPICILLIN TRIHYDRATE 250 MG
2 CAPSULE ORAL 3 TIMES DAILY
COMMUNITY

## 2019-05-21 RX ORDER — FLUTICASONE PROPIONATE 50 MCG
1 SPRAY, SUSPENSION (ML) NASAL DAILY PRN
COMMUNITY
End: 2022-04-08

## 2019-05-21 RX ORDER — BUMETANIDE 2 MG/1
2 TABLET ORAL DAILY
Qty: 30 TABLET | Refills: 3 | Status: SHIPPED | OUTPATIENT
Start: 2019-05-22 | End: 2019-10-01 | Stop reason: SDUPTHER

## 2019-05-21 RX ORDER — CARVEDILOL 6.25 MG/1
6.25 TABLET ORAL 2 TIMES DAILY WITH MEALS
Qty: 60 TABLET | Refills: 3 | Status: SHIPPED | OUTPATIENT
Start: 2019-05-21

## 2019-05-21 RX ORDER — POTASSIUM CHLORIDE 750 MG/1
10 TABLET, EXTENDED RELEASE ORAL 2 TIMES DAILY
Qty: 60 TABLET | Refills: 3 | Status: SHIPPED | OUTPATIENT
Start: 2019-05-21 | End: 2019-10-01 | Stop reason: SDUPTHER

## 2019-05-21 RX ORDER — TRAMADOL HYDROCHLORIDE 50 MG/1
50 TABLET ORAL DAILY PRN
COMMUNITY
End: 2019-10-01 | Stop reason: SDUPTHER

## 2019-05-21 RX ADMIN — BUMETANIDE 2 MG: 1 TABLET ORAL at 08:21

## 2019-05-21 RX ADMIN — PRAVASTATIN SODIUM 40 MG: 40 TABLET ORAL at 19:53

## 2019-05-21 RX ADMIN — SERTRALINE 50 MG: 50 TABLET, FILM COATED ORAL at 08:22

## 2019-05-21 RX ADMIN — Medication 10 ML: at 19:53

## 2019-05-21 RX ADMIN — SACUBITRIL AND VALSARTAN 1 TABLET: 24; 26 TABLET, FILM COATED ORAL at 19:53

## 2019-05-21 RX ADMIN — POTASSIUM CHLORIDE 10 MEQ: 20 TABLET, EXTENDED RELEASE ORAL at 19:52

## 2019-05-21 RX ADMIN — SACUBITRIL AND VALSARTAN 1 TABLET: 24; 26 TABLET, FILM COATED ORAL at 08:22

## 2019-05-21 RX ADMIN — ACETYLCYSTEINE 500 MG: 500 TABLET, EFFERVESCENT ORAL at 19:52

## 2019-05-21 RX ADMIN — TRAMADOL HYDROCHLORIDE 50 MG: 50 TABLET, FILM COATED ORAL at 23:03

## 2019-05-21 RX ADMIN — Medication 10 ML: at 08:22

## 2019-05-21 RX ADMIN — ENOXAPARIN SODIUM 40 MG: 40 INJECTION SUBCUTANEOUS at 08:22

## 2019-05-21 RX ADMIN — CARVEDILOL 6.25 MG: 6.25 TABLET, FILM COATED ORAL at 18:20

## 2019-05-21 RX ADMIN — ACETYLCYSTEINE 500 MG: 500 TABLET, EFFERVESCENT ORAL at 08:22

## 2019-05-21 RX ADMIN — POTASSIUM CHLORIDE 10 MEQ: 20 TABLET, EXTENDED RELEASE ORAL at 08:22

## 2019-05-21 RX ADMIN — CARVEDILOL 6.25 MG: 6.25 TABLET, FILM COATED ORAL at 10:01

## 2019-05-21 ASSESSMENT — PAIN SCALES - GENERAL
PAINLEVEL_OUTOF10: 4
PAINLEVEL_OUTOF10: 0

## 2019-05-21 NOTE — PROGRESS NOTES
Nasal Daily    pravastatin  40 mg Oral Nightly    sertraline  50 mg Oral Daily     Continuous Infusions:    PRN Meds:nitroGLYCERIN, sodium chloride flush, acetaminophen, magnesium hydroxide, ondansetron, traMADol, albuterol sulfate HFA **AND** ipratropium **AND** MDI Treatment    Diet:  DIET CARDIAC;    DVT Prophylaxis:    Data:  CBC:   Recent Labs     05/20/19  0745   WBC 4.7*   RBC 4.19*   HGB 12.7   HCT 38.8   MCV 92.6        BMP:   Recent Labs     05/19/19  0444 05/20/19  0745    142   K 3.7 3.8    106   CO2 22* 24   BUN 35* 28*   CREATININE 0.9 0.7     BNP: No results for input(s): BNP in the last 72 hours. PT/INR: No results for input(s): PROTIME, INR in the last 72 hours. APTT: No results for input(s): APTT in the last 72 hours. CARDIAC ENZYMES: No results for input(s): CKMB, CKMBINDEX, TROPONINT in the last 72 hours. Invalid input(s): CKTOTAL;3        Principal Problem:    Pulmonary edema, acute ([address]  Active Problems:    Hypertensive heart disease with congestive heart failure (HCC)    Heart failure, left, with LVEF <=30% (HCC)  Resolved Problems:    * No resolved hospital problems. *      Active Issues    Assessment & Plan:     1. Acute systolic heart failure  - 2/2 ischemic cardiomyopathy; LVEF 25%  - Cardiology evaluation and recommendations acknowledged with thanks  - currently on Entresto, BB and Statin    2. Acute Pulmonary Edema  - resolved with diuresis    3.  Hypertensive Heart Disease  - BP is better controlled; but running low  - will defer adjustments of cardiac medications to Dr. Edel Sexton    Electronically signed by Shade Traylor MD on 5/21/2019 at 1:18 PM    Attending Physician

## 2019-05-21 NOTE — PROGRESS NOTES
Spoke with Kelvin ''R''  from [facility]. Patient's order remains under review and she will call as soon as she hears from company. Patient updated.

## 2019-05-21 NOTE — TELEPHONE ENCOUNTER
.Transition of Care visit scheduled.   5/28/2019  Patient is being discharged to home  Date of discharge 05/22/19  Discharge from facility [facility]  Reason for admission pulmonary edema

## 2019-05-21 NOTE — DISCHARGE INSTR - COC
Continuity of Care Form    Patient Name: Rupert Zaidi   :  1945  MRN:  [id number]    Admit date:  2019  Discharge date:  ***    Code Status Order: Full Code   Advance Directives:   Advance Care Flowsheet Documentation     Date/Time Healthcare Directive Type of Healthcare Directive Copy in [address] Agent's Name Healthcare Agent's Phone Number    [phone]  No, patient does not have an advance directive for healthcare treatment -- -- -- -- --          Admitting Physician:  Danny Oscar MD  PCP: Diego Lagunas DO    Discharging Nurse: [facility] Unit/Room#: [facility] [facility]  Discharging Unit Phone Number: ***    Emergency Contact:   Extended Emergency Contact Information  Primary Emergency Contact: Simona Lawrence  Address: [facility] [phone] [address], [place] [address] [address] Phone: [phone]  Relation: Spouse  Secondary Emergency Contact: Marlin Magallon   [address] Phone: [phone]  Relation: Child    Past Surgical History:  Past Surgical History:   Procedure Laterality Date    CARDIAC CATHETERIZATION  10/30/14    Dr. Shanta Burns  2006   Mily Garcia Left     COLONOSCOPY     [address] [place] [address] Left 2016    Dr. Ruba Yadav Right 2006    Dr. Mary Way Bilateral   &    Elnita Gonzalez  2009   [address] [place] [address]       Immunization History:   Immunization History   Administered Date(s) Administered    Influenza Vaccine, unspecified formulation 2016    Influenza Virus Vaccine 2015    Influenza, Triv, inactivated, subunit, adjuvanted, IM (Fluad 65 yrs and older) 2018    Pneumococcal 13-valent Conjugate (Lrlbfrf82) 2015    Pneumococcal Polysaccharide (Ghihrmksk94) 10/12/2011    Tdap (Boostrix, Adacel) 2015    Zoster Live (Zostavax) 10/13/2014       Active per 24 hour   Intake 770 ml   Output 3200 ml   Net -2430 ml     I/O last 3 completed shifts:   In: 200 [P.O.:770]  Out: 3200 [Urine:3200]    Safety Concerns:     [address] Safety Concerns:304995620}    Impairments/Disabilities:      [address] Impairments/Disabilities:173808930}    Nutrition Therapy:  Current Nutrition Therapy:   { NIESHA Diet List:188970807}    Routes of Feeding: {[facility] DME Other Feedings:185712686}  Liquids: {Slp liquid thickness:78510}  Daily Fluid Restriction: {[facility] DME Yes amt example:711218276}  Last Modified Barium Swallow with Video (Video Swallowing Test): {Done Not Done HNRF:558914262}    Treatments at the Time of Hospital Discharge:   Respiratory Treatments: ***  Oxygen Therapy:  {Therapy; copd oxygen:72422}  Ventilator:    { CC Vent IQUA:003565525}    Rehab Therapies: {THERAPEUTIC INTERVENTION:8833740063}  Weight Bearing Status/Restrictions: {[facility] Weight Bearin}  Other Medical Equipment (for information only, NOT a DME order):  {EQUIPMENT:239776655}  Other Treatments: ***    Patient's personal belongings (please select all that are sent with patient):  {[facility] DME Belongings:897375122}    RN SIGNATURE:  {Esignature:500994375}    CASE MANAGEMENT/SOCIAL WORK SECTION    Inpatient Status Date: ***    Readmission Risk Assessment Score:  Readmission Risk              Risk of Unplanned Readmission:        14           Discharging to Facility/ Agency   · Name:   · Address:  · Phone:  · Fax:    Dialysis Facility (if applicable)   · Name:  · Address:  · Dialysis Schedule:  · Phone:  · Fax:    / signature: {Esignature:832202624}    PHYSICIAN SECTION    Prognosis: {Prognosis:6774195102}    Condition at Discharge: [address] Patient Condition:953715058}    Rehab Potential (if transferring to Rehab): {Prognosis:0744347352}    Recommended Labs or Other Treatments After Discharge: ***    Physician Certification: I certify the above information and transfer of Barnie Duane  is necessary for the continuing treatment of the diagnosis listed and that she requires {Admit to Appropriate Level of Care:57297} for {GREATER/LESS:847991346} 30 days.      Update Admission H&P: {[facility] DME Changes in [facility]}    PHYSICIAN SIGNATURE:  {Esignature:288715788}

## 2019-05-21 NOTE — PROGRESS NOTES
Pharmacy Medication History Note      List of current medications patient is taking is complete. Source of information: Patient/[facility]    Changes made to medication list:  Medications removed (include reason, ex. therapy complete or physician discontinued):  Removed DHEA. Patient is no longer taking it. Medications added/doses adjusted:  Adjusted cinnamon dose to 500 mg PO daily  Adjusted Flonase to 1 spray/nostril daily as needed for rhinitis  Adjusted Ultram to 50 mg PO daily as needed for pain    Other notes (ex. Recent course of antibiotics, Coumadin dosing):  Denies use of other OTC or herbal medications.       Allergies reviewed      Electronically signed by Tonny Armstrong, [facility] on 5/21/2019 at 11:33 AM

## 2019-05-21 NOTE — PROGRESS NOTES
Discharge order received. Patient is awaiting representative from [address] to place life vest on patient for discharge. Patient updated.

## 2019-05-21 NOTE — CARE COORDINATION
Updated cardiac cath report and progress note [facility] were [facility].   Electronically signed by Candy Hernández RN on 5/21/2019 at 2:58 PM

## 2019-05-21 NOTE — PROGRESS NOTES
no CVA tenderness   Lungs:     Clear to auscultation bilaterally, respirations unlabored   Chest Wall:    No tenderness or deformity    Heart:    Regular rate and rhythm, S1 and S2 normal, no murmur, rub   or gallop   Breast Exam:    No tenderness, masses, or nipple abnormality   Abdomen:     Soft, non-tender, bowel sounds active all four quadrants,     no masses, no organomegaly   Genitalia:    Normal female without lesion, discharge or tenderness   Rectal:    Normal tone ;guaiac negative stool   Extremities:   Extremities normal, atraumatic, no cyanosis or edema   Pulses:   2+ and symmetric all extremities   Skin:   Skin color, texture, turgor normal, no rashes or lesions   Lymph nodes:   Cervical, supraclavicular, and axillary nodes normal   Neurologic:   CNII-XII intact, normal strength, sensation and reflexes     throughout       ECG: normal sinus rhythm, no blocks or conduction defects, no ischemic changes. Data Review:   CBC:  Lab Results   Component Value Date    WBC 4.7 05/20/2019    RBC 4.19 05/20/2019    RBC 0-2 07/13/2011    HGB 12.7 05/20/2019    HCT 38.8 05/20/2019    MCV 92.6 05/20/2019    MCH 30.3 05/20/2019    MCHC 32.7 05/20/2019    RDW 14.1 08/18/2017     05/20/2019    MPV 10.1 05/20/2019     BMP  Lab Results   Component Value Date     05/20/2019    K 3.8 05/20/2019     05/20/2019    CO2 24 05/20/2019    BUN 28 05/20/2019    CREATININE 0.7 05/20/2019    CALCIUM 8.9 05/20/2019      COAG PROFILE:  No results found for: APTT, INR    Assessment:     Principal Problem:    Heart failure, left, with LVEF <=30% (HCC)  Active Problems:    Pulmonary edema, acute (HCC)    Hypertensive heart disease with congestive heart failure (HCC)  Resolved Problems:    * No resolved hospital problems.  *      Plan:   Patient with non ischemic chf   continue medical rx    will need life vest placement    ambulate    will see at the office few weeks

## 2019-05-21 NOTE — PLAN OF CARE
shift. Patient receiving PRN medication to help treat history of chronic pain. Problem: Pain:  Goal: Control of chronic pain  Description  Control of chronic pain  Outcome: Ongoing  Note:   Patient has not reported any chronic pain this shift. Patient has PRN medications to help manage pain. Care plan reviewed with patient. Patient verbalizes understanding of the plan of care and contributed to goal setting.

## 2019-05-21 NOTE — PLAN OF CARE
Problem: Falls - Risk of:  Goal: Will remain free from falls  Description  Will remain free from falls  5/21/2019 0243 by Shauna Chris RN  Outcome: Ongoing  Note:   Patient had no falls this shift. Patient is alert and oriented x4. No IV fluids infusing. Up standby assist. Call light used appropriately and bed alarm set. Hourly rounding in place. Problem: Falls - Risk of:  Goal: Absence of physical injury  Description  Absence of physical injury  5/20/2019 1749 by Yolanda Young RN  Outcome: Ongoing  Note:   Pt remains free of physical injury this shift, will continue to provide a safe environment. Problem: Cardiac:  Goal: Ability to maintain an adequate cardiac output will improve  Description  Ability to maintain an adequate cardiac output will improve  5/21/2019 0243 by Shauna Chris RN  Outcome: Ongoing  Note:   Patient's EF 25%. Lifevest to be placed hopefully today 5/21     Problem: Fluid Volume:  Goal: Risk for excess fluid volume will decrease  Description  Risk for excess fluid volume will decrease  5/21/2019 0243 by Shauna Chris RN  Outcome: Ongoing  Note:   EF 25%. Bumex ordered PO daily per Dr. Kasi Hampton. Good urine output post Bumex administration. Lungs clear/diminished. Problem: Discharge Planning  Goal: Knowledge of discharge instructions  Description  Knowledge of discharge instructions     5/21/2019 0243 by Shauna Chris RN  Outcome: Ongoing  Note:   From home with . No new discharge plans at this time. Problem: Pain:  Goal: Pain level will decrease  Description  Pain level will decrease  5/21/2019 0243 by Shauna Chris RN  Outcome: Ongoing  Note:   Pain Assessment: 0-10  Pain Level: 4   Pain goal:  4  Is pain goal met at this time? Yes     Additional interventions to be implemented: medications Tramadol BID--home med, position change and rest    Care plan reviewed with patient and daughter at bedside.   Patient and daughter at bedside verbalize understanding of the plan of care and contribute to goal setting.

## 2019-05-22 VITALS
BODY MASS INDEX: 36.02 KG/M2 | WEIGHT: 211 LBS | HEIGHT: 64 IN | DIASTOLIC BLOOD PRESSURE: 63 MMHG | RESPIRATION RATE: 16 BRPM | OXYGEN SATURATION: 96 % | HEART RATE: 66 BPM | TEMPERATURE: 97.5 F | SYSTOLIC BLOOD PRESSURE: 121 MMHG

## 2019-05-22 PROCEDURE — 6370000000 HC RX 637 (ALT 250 FOR IP): Performed by: INTERNAL MEDICINE

## 2019-05-22 RX ADMIN — BUMETANIDE 2 MG: 1 TABLET ORAL at 10:23

## 2019-05-22 RX ADMIN — SACUBITRIL AND VALSARTAN 1 TABLET: 24; 26 TABLET, FILM COATED ORAL at 10:22

## 2019-05-22 RX ADMIN — CARVEDILOL 6.25 MG: 6.25 TABLET, FILM COATED ORAL at 10:23

## 2019-05-22 RX ADMIN — SERTRALINE 50 MG: 50 TABLET, FILM COATED ORAL at 10:23

## 2019-05-22 RX ADMIN — FLUTICASONE PROPIONATE 2 SPRAY: 50 SPRAY, METERED NASAL at 10:23

## 2019-05-22 RX ADMIN — POTASSIUM CHLORIDE 10 MEQ: 20 TABLET, EXTENDED RELEASE ORAL at 10:23

## 2019-05-22 ASSESSMENT — PAIN SCALES - GENERAL
PAINLEVEL_OUTOF10: 0
PAINLEVEL_OUTOF10: 0

## 2019-05-22 NOTE — CARE COORDINATION
5/22/19, 11:01 AM    Discharge plan discussed by  and . Discharge plan reviewed with patient/ family. Patient/ family verbalize understanding of discharge plan and are in agreement with plan. Understanding was demonstrated using the teach back method. IMM Letter  IMM Letter given to Patient/Family/Significant other/Guardian/POA/by[de-identified] CM  IMM Letter date given[de-identified] 05/21/19  IMM Letter time given[de-identified] 1500     Pt to be discharged to home with spouse and new Life Vest. Denies needs or services.

## 2019-05-23 ENCOUNTER — TELEPHONE (OUTPATIENT)
Dept: FAMILY MEDICINE CLINIC | Age: 74
End: 2019-05-23

## 2019-05-23 NOTE — TELEPHONE ENCOUNTER
[facility] Trans[facility] Initial Follow Up Call    Outreach made within 2 business days of discharge: Yes    Patient: Abhishek Padilla Patient : 1945   MRN: [id number]  Reason for Admission: There are no discharge diagnoses documented for the most recent discharge.   Discharge Date: 19       Spoke with: [facility] to return call    Discharge department/facility: [facility]    TCM Interactive Patient Contact:      Scheduled appointment with PCP within 7-14 days    Follow Up  Future Appointments   Date Time Provider Harmony Dos Santos   2019  4:40 PM Fanta [place], [address] [place] [address]   2019  2:40 PM [id number] [address] [facility] [address]       Mortimer Maul, LPN

## 2019-05-27 PROBLEM — I50.1 HEART FAILURE, LEFT, WITH LVEF <=30% (HCC): Chronic | Status: ACTIVE | Noted: 2019-05-20

## 2019-05-27 NOTE — PROGRESS NOTES
capsule Take 1 capsule by mouth daily B-Right by Carry Sat at [facility]  before and as finish breakfast and before supper      pravastatin (PRAVACHOL) 40 MG tablet Take 40 mg by mouth daily.  Omega-3 1400 MG CAPS Take 1 capsule by mouth 2 times daily [facility] #[id number]  before and as finish breakfast and supper      sertraline (ZOLOFT) 50 MG tablet Take 50 mg by mouth daily.  Cholecalciferol (VITAMIN D3) 2000 units CAPS Take 1 capsule by mouth daily        No current facility-administered medications for this visit. No orders of the defined types were placed in this encounter. All medications reviewed and reconciled, including OTC and herbal medications. Updated list given to patient. Patient Active Problem List    Diagnosis Date Noted    Heart failure, left, with LVEF <=30% ([address] 05/20/2019    Pulmonary edema, acute ([address] 05/17/2019    Hypertensive heart disease with congestive heart failure ([address] 05/17/2019    Controlled substance agreement signed; tramadol 03/27/2019    Asthma, mild intermittent      See's [place]uch[place]      History of gastroesophageal reflux (GERD)     SUNI on CPAP      wears CPAP nightly      Osteopenia     Seasonal allergies     Osteoarthritis     CAD (coronary artery disease)      mild disease in small vessels per cath 2014. Follows with Jose      Left bundle branch block     Hypertension     Dyslipidemia     Obesity (BMI 30-39. 9)     Vitamin D deficiency 05/24/2016    Homocysteinemia ([address] 04/18/2016    Chronic low back pain     LYLA (generalized anxiety disorder)        Past Medical History:   Diagnosis Date    Asthma, mild intermittent     See's [place]uch[place]udi    CAD (coronary artery disease)     mild disease in small vessels per cath 2014.  [facility] Chronic low back pain     Controlled substance agreement signed; tramadol 3/27/2019    Dyslipidemia     LYLA (generalized anxiety disorder)     Heart failure, left, with LVEF <=30% ([address] 5/20/2019    History of gastroesophageal reflux (GERD)     no longer on medication    Homocysteinemia ([facility] 4/18/2016    Hypertension     Left bundle branch block     Obesity (BMI 30-39. 9)     SUNI on CPAP     wears CPAP nightly    Osteoarthritis     Osteopenia     Seasonal allergies     Vitamin D deficiency 5/24/2016         Past Surgical History:   Procedure Laterality Date    CARDIAC CATHETERIZATION  10/30/14    Dr. Juan Caba  08/2006    Dhruv Jose Left 2009    COLONOSCOPY  2007    EYE SURGERY Left 04/23/2016    Dr. Petros Art Right 05/24/2006    Dr. Denae Rodriguez Bilateral 2006  & 2009    ROTATOR CUFF REPAIR  11/2009    SINUS SURGERY  1990         Allergies   Allergen Reactions    Duricef [Cefadroxil]          Social History     Tobacco Use    Smoking status: Passive Smoke Exposure - Never Smoker    Smokeless tobacco: Never Used   Substance Use Topics    Alcohol use: No         Family History   Problem Relation Age of Onset    Heart Disease Mother     Cancer Father     Cancer Brother     Dementia Brother     Diabetes Maternal Grandmother     Heart Disease Maternal Grandmother     Glaucoma Brother          I have reviewed the patient's past medical history, past surgical history, allergies, medications, social and family history and I have made updates where appropriate.       Review of Systems  Positive responses are highlighted in bold    Constitutional:  Fever, Chills, Night Sweats, Fatigue, Unexpected changes in weight  HENT:  Ear pain, Tinnitus, Nosebleeds, Trouble swallowing, Hearing loss, Sore throat  Cardiovascular:  Chest Pain, Palpitations, Orthopnea, Paroxysmal Nocturnal Dyspnea  Respiratory:  Cough, Wheezing, Shortness of breath, Chest tightness, Apnea  Gastrointestinal:  Nausea, Vomiting, Diarrhea, Constipation, Heartburn, Blood in stool  Genitourinary:  Difficulty or painful urination, Flank pain, Change in frequency, Urgency  Skin:  Color change, Rash, Itching, Wound  Musculoskeletal:  Joint pain, Back pain, Gait problems, Joint swelling, Myalgias  Neurological:  Dizziness, Headaches, Presyncope, Numbness, Seizures, Tremors  Endocrine:  Heat Intolerance, Cold Intolerance, Polydipsia, Polyphagia, Polyuria      PHYSICAL EXAM:  Vitals:    05/28/19 1652   BP: 118/62   Pulse: 72   Resp: 18   Temp: 97.4 °F (36.3 °C)   TempSrc: Oral   Weight: 210 lb (95.3 kg)   Height: 5' 2.09\" (1.577 m)     Body mass index is 38.3 kg/m². Pain Score:   0 - No pain    VS Reviewed  General Appearance: A&O x 3, No acute distress,well developed and well- nourished  Eyes: pupils equal, round, and reactive to light, extraocular eye movements intact, conjunctivae and eye lids without erythema  ENT: external ear and ear canal clear bilaterally, TMs intact and regular, nose without deformity, nasal mucosa and turbinates normal without polyps, oropharynx normal, dentition is normal for age  Neck: supple and non-tender without mass, no thyromegaly or thyroid nodules, no cervical lymphadenopathy  Pulmonary/Chest: clear to auscultation bilaterally- no wheezes, rales or rhonchi, normal air movement, no respiratory distress or retractions  Cardiovascular: S1 and S2 auscultated w/ RRR. No murmurs, rubs, clicks, or gallops, distal pulses intact. Abdomen: soft, non-tender, non-distended, bowel sounds physiologic,  no rebound or guarding, no masses or hernias noted. Liver and spleen without enlargement. Extremities: no cyanosis, clubbing or edema of the lower extremities. +2 PT/DP bilaterally.    Musculoskeletal: No joint swelling or gross deformity   Skin: warm and dry, no rash or erythema    Recent Labs     05/20/19  0745 05/16/19  2252 04/30/19  1525   WBC 4.7* 6.3 5.1   HGB 12.7 14.8 13.6   HCT 38.8 45.0 41.2   MCV 92.6 91.8 91.2    225 253       Lab Results   Component Value Date     05/20/2019    K 3.8 05/20/2019     05/20/2019 effusion. There is no pneumothorax.       Heart: There is mild cardiomegaly. There is no pericardial effusion.       Pulmonary vasculature: There is adequate opacification of the pulmonary arteries. There is no pulmonary embolism.       Julia and mediastinum: There is no hilar or mediastinal mass or adenopathy.       Thoracic aorta/vascular: There is no thoracic aortic aneurysm or dissection. The imaged brachiocephalic arteries are unremarkable.       Imaged upper abdomen: There are splenic calcified granulomas.       Musculoskeletal system: There are suture anchors in the left humeral head. There is a mild S-shaped scoliosis of the spine. There is multilevel vertebral endplate spondylosis.       Chest/body wall soft tissues: Unremarkable       Thyroid: Unremarkable               Impression       No acute pulmonary embolism. Bilateral multilobar patchy groundglass infiltrates with interlobular septal thickening probably representing pulmonary edema. Atypical pneumonia felt to be less likely.           **This report has been created using voice recognition software. It may contain minor errors which are inherent in voice recognition technology. **       Final report electronically signed by Dr. Lisset Sorto on 5/17/2019 12:51 AM       ECHO 16 MAY 2019   Summary   Ejection fraction is visually estimated at 25-30%.   There was severe global hypokinesis of the left ventricle.   The aortic valve leaflets were not well visualized.   Aortic valve appears tricuspid.   Aortic valve leaflets are somewhat thickened. CARDIAC CATH 16 MAY 2019  IMPRESSION:  1. Patent tortuous coronary arteries. 2.  Severe systolic dysfunction of the left ventricle. Ejection  fraction about 25%. Severe diffuse hypokinesia of the left ventricle. 3.  No mitral regurg. No gradient across the aortic valve.   4.  Diastolic dysfunction of the left ventricle with left ventricular  end-diastolic pressure of 21 mmHg.     The patient will be treated medically with the close monitoring of her  left ventricular function. She will need a LifeVest and she might need  a BiV AICD if she does not improve in her left ventricular function.     Thank you for very much for allowing us to share in the management of  this patient. ASSESSMENT & PLAN  1. Heart failure, left, with LVEF <=30% (HCC)    Unclear etiology  Neg cath  con't BB, entresto  F/u cardio  Low salt diet  Daily wts  Reviewed ER precautions, pt understands. Date of Discharge: 5/22/2019  Date of interactive contact with pt/caregiver: 5/23/2019  Date of face-to-face visit: 5/28/2019  Complexity of medical decision making: High    - CT DISCHARGE MEDS RECONCILED W/ CURRENT OUTPATIENT MED LIST    2. Hypertensive heart disease with acute on chronic systolic congestive heart failure ([address]    As per # 1    - CT DISCHARGE MEDS RECONCILED W/ CURRENT OUTPATIENT MED LIST    3. Pulmonary edema, acute ([address]    Resolved  As per # 1    - CT DISCHARGE MEDS RECONCILED W/ CURRENT OUTPATIENT MED LIST    4. Coronary artery disease involving native coronary artery of native heart without angina pectoris    con't secondary prevention and cardio f/u    - CT DISCHARGE MEDS RECONCILED W/ CURRENT OUTPATIENT MED LIST    5. Essential hypertension    At goal  con't adjusted meds    - CT DISCHARGE MEDS RECONCILED W/ CURRENT OUTPATIENT MED LIST    6. Dyslipidemia    con't statin  Labs appropriate    - CT DISCHARGE MEDS RECONCILED W/ CURRENT OUTPATIENT MED LIST      DISPOSITION    Return in 4 months (on 9/25/2019) for follow-up on chronic medical conditions, sooner as needed. [place] released without restrictions.     Future Appointments   Date Time Provider Harmony Dos Santos   9/25/2019  2:40  [facility] Madelyn received counseling on the following healthy behaviors: nutrition, exercise and medication adherence    Patient given educational materials on:

## 2019-05-28 ENCOUNTER — NURSE ONLY (OUTPATIENT)
Dept: LAB | Age: 74
End: 2019-05-28

## 2019-05-28 ENCOUNTER — OFFICE VISIT (OUTPATIENT)
Dept: FAMILY MEDICINE CLINIC | Age: 74
End: 2019-05-28
Payer: MEDICARE

## 2019-05-28 VITALS
DIASTOLIC BLOOD PRESSURE: 62 MMHG | HEART RATE: 72 BPM | WEIGHT: 210 LBS | SYSTOLIC BLOOD PRESSURE: 118 MMHG | HEIGHT: 62 IN | TEMPERATURE: 97.4 F | RESPIRATION RATE: 18 BRPM | BODY MASS INDEX: 38.64 KG/M2

## 2019-05-28 DIAGNOSIS — I10 ESSENTIAL HYPERTENSION: Chronic | ICD-10-CM

## 2019-05-28 DIAGNOSIS — I11.0 HYPERTENSIVE HEART DISEASE WITH ACUTE ON CHRONIC SYSTOLIC CONGESTIVE HEART FAILURE (HCC): ICD-10-CM

## 2019-05-28 DIAGNOSIS — I50.23 HYPERTENSIVE HEART DISEASE WITH ACUTE ON CHRONIC SYSTOLIC CONGESTIVE HEART FAILURE (HCC): ICD-10-CM

## 2019-05-28 DIAGNOSIS — I50.1 HEART FAILURE, LEFT, WITH LVEF <=30% (HCC): Primary | ICD-10-CM

## 2019-05-28 DIAGNOSIS — E78.5 DYSLIPIDEMIA: Chronic | ICD-10-CM

## 2019-05-28 DIAGNOSIS — J81.0 PULMONARY EDEMA, ACUTE (HCC): ICD-10-CM

## 2019-05-28 DIAGNOSIS — R79.83 HOMOCYSTEINEMIA: ICD-10-CM

## 2019-05-28 DIAGNOSIS — I25.10 CORONARY ARTERY DISEASE INVOLVING NATIVE CORONARY ARTERY OF NATIVE HEART WITHOUT ANGINA PECTORIS: Chronic | ICD-10-CM

## 2019-05-28 DIAGNOSIS — I10 ESSENTIAL (PRIMARY) HYPERTENSION: ICD-10-CM

## 2019-05-28 PROCEDURE — 1111F DSCHRG MED/CURRENT MED MERGE: CPT | Performed by: FAMILY MEDICINE

## 2019-05-28 PROCEDURE — 99496 TRANSJ CARE MGMT HIGH F2F 7D: CPT | Performed by: FAMILY MEDICINE

## 2019-05-28 NOTE — PATIENT INSTRUCTIONS
Patient Education        Heart Failure: Care Instructions  Your Care Instructions    Heart failure occurs when your heart does not pump as much blood as the body needs. Failure does not mean that the heart has stopped pumping but rather that it is not pumping as well as it should. Over time, this causes fluid buildup in your lungs and other parts of your body. Fluid buildup can cause shortness of breath, fatigue, swollen ankles, and other problems. By taking medicines regularly, reducing sodium (salt) in your diet, checking your weight every day, and making lifestyle changes, you can feel better and live longer. Follow-up care is a key part of your treatment and safety. Be sure to make and go to all appointments, and call your doctor if you are having problems. It's also a good idea to know your test results and keep a list of the medicines you take. How can you care for yourself at home? Medicines    · Be safe with medicines. Take your medicines exactly as prescribed. Call your doctor if you think you are having a problem with your medicine.     · Do not take any vitamins, over-the-counter medicine, or herbal products without talking to your doctor first. Arnulfo Augustin not take ibuprofen (Advil or Motrin) and naproxen (Aleve) without talking to your doctor first. They could make your heart failure worse.     · You may be taking some of the following medicine. ? Beta-blockers can slow heart rate, decrease blood pressure, and improve your condition. Taking a beta-blocker may lower your chance of needing to be hospitalized. ? Angiotensin-converting enzyme inhibitors (ACEIs) reduce the heart's workload, lower blood pressure, and reduce swelling. Taking an ACEI may lower your chance of needing to be hospitalized again. ? Angiotensin II receptor blockers (ARBs) work like ACEIs. Your doctor may prescribe them instead of ACEIs. ? Diuretics, also called water pills, reduce swelling. ?  Potassium supplements replace this important mineral, which is sometimes lost with diuretics. ? Aspirin and other blood thinners prevent blood clots, which can cause a stroke or heart attack.    You will get more details on the specific medicines your doctor prescribes. Diet    · Your doctor may suggest that you limit sodium to 2,000 milligrams (mg) a day or less. That is less than 1 teaspoon of salt a day, including all the salt you eat in cooking or in packaged foods. People get most of their sodium from processed foods. Fast food and restaurant meals also tend to be very high in sodium.     · Ask your doctor how much liquid you can drink each day. You may have to limit liquids.    Weight    · Weigh yourself without clothing at the same time each day. Record your weight. Call your doctor if you have a sudden weight gain, such as more than 2 to 3 pounds in a day or 5 pounds in a week. (Your doctor may suggest a different range of weight gain.) A sudden weight gain may mean that your heart failure is getting worse.    Activity level    · Start light exercise (if your doctor says it is okay). Even if you can only do a small amount, exercise will help you get stronger, have more energy, and manage your weight and your stress. Walking is an easy way to get exercise. Start out by walking a little more than you did before. Bit by bit, increase the amount you walk.     · When you exercise, watch for signs that your heart is working too hard. You are pushing yourself too hard if you cannot talk while you are exercising. If you become short of breath or dizzy or have chest pain, stop, sit down, and rest.     · If you feel \"wiped out\" the day after you exercise, walk slower or for a shorter distance until you can work up to a better pace.     · Get enough rest at night. Sleeping with 1 or 2 pillows under your upper body and head may help you breathe easier.    Lifestyle changes    · Do not smoke. Smoking can make a heart condition worse.  If you need help professional. [address] any warranty or liability for your use of this information. Patient Education        Heart Failure and Sleep Apnea: Care Instructions  Your Care Instructions    Sleep apnea is fairly common in people with advanced heart failure. Sleep apnea means you stop breathing for 10 seconds or longer during sleep. It may cause you to snore loudly and not sleep well, so you wake up feeling tired. Getting treatment for sleep apnea can help you sleep and feel better. It may also help keep your heart failure from getting worse. Follow-up care is a key part of your treatment and safety. Be sure to make and go to all appointments, and call your doctor if you are having problems. It's also a good idea to know your test results and keep a list of the medicines you take. How can you care for yourself at home? · Lose weight, if needed. It may reduce the number of times you stop breathing or have slowed breathing. · Go to bed at the same time every night. · Sleep on your side. It may stop mild apnea. If you tend to roll onto your back, sew a pocket in the back of your [employer] top. Put a tennis ball into the pocket, and stitch the pocket shut. This will help keep you from sleeping on your back. · Avoid alcohol and medicines such as sleeping pills and sedatives before bed. · Do not smoke. Smoking can make heart failure and sleep apnea worse. If you need help quitting, talk to your doctor about stop-smoking programs and medicines. These can increase your chances of quitting for good. · Prop up the head of your bed 4 to 6 inches by putting bricks under the legs of the bed. · Try a continuous positive airway pressure (CPAP) breathing machine if your doctor recommends it. The machine keeps your airway from closing when you sleep. It may take time for you to get used to CPAP.   · If your nose feels dry or bleeds when you use one of these machines, talk with your doctor about

## 2019-05-30 LAB — HOMOCYSTEINE, TOTAL: 26 UMOL/L

## 2019-05-31 ENCOUNTER — TELEPHONE (OUTPATIENT)
Dept: FAMILY MEDICINE CLINIC | Age: 74
End: 2019-05-31

## 2019-05-31 DIAGNOSIS — R79.83 HOMOCYSTEINEMIA: Chronic | ICD-10-CM

## 2019-05-31 DIAGNOSIS — I10 ESSENTIAL HYPERTENSION: Primary | ICD-10-CM

## 2019-05-31 RX ORDER — LANOLIN ALCOHOL/MO/W.PET/CERES
400 CREAM (GRAM) TOPICAL DAILY
Qty: 90 TABLET | Refills: 3 | Status: SHIPPED | OUTPATIENT
Start: 2019-05-31 | End: 2019-09-25

## 2019-05-31 NOTE — TELEPHONE ENCOUNTER
----- Message from Marissa Duggan DO sent at 5/31/2019  7:13 AM EDT -----  Please let pt know that homocystein level is high (lab that can be high if folate level is low and can cause inflammation). Recommend we start a folic acid supplement and repeat labs 3 months, fasting. Let me know if questions, thanks!

## 2019-06-03 ENCOUNTER — HOSPITAL ENCOUNTER (OUTPATIENT)
Age: 74
Discharge: HOME | End: 2019-06-03
Payer: MEDICARE

## 2019-06-03 DIAGNOSIS — I50.1 HEART FAILURE, LEFT, WITH LVEF <=30% (HCC): ICD-10-CM

## 2019-06-03 DIAGNOSIS — J81.0 PULMONARY EDEMA, ACUTE (HCC): ICD-10-CM

## 2019-06-03 LAB
ANION GAP SERPL CALCULATED.3IONS-SCNC: 16 MEQ/L (ref 8–16)
BUN BLDV-MCNC: 25 MG/DL (ref 7–22)
CALCIUM SERPL-MCNC: 10.1 MG/DL (ref 8.5–10.5)
CHLORIDE BLD-SCNC: 101 MEQ/L (ref 98–111)
CO2: 26 MEQ/L (ref 23–33)
CREAT SERPL-MCNC: 0.9 MG/DL (ref 0.4–1.2)
ERYTHROCYTE [DISTWIDTH] IN BLOOD BY AUTOMATED COUNT: 12.3 % (ref 11.5–14.5)
ERYTHROCYTE [DISTWIDTH] IN BLOOD BY AUTOMATED COUNT: 41 FL (ref 35–45)
GFR SERPL CREATININE-BSD FRML MDRD: 61 ML/MIN/1.73M2
GLUCOSE BLD-MCNC: 100 MG/DL (ref 70–108)
HCT VFR BLD CALC: 40 % (ref 37–47)
HEMOGLOBIN: 13.2 GM/DL (ref 12–16)
MCH RBC QN AUTO: 30.1 PG (ref 26–33)
MCHC RBC AUTO-ENTMCNC: 33 GM/DL (ref 32.2–35.5)
MCV RBC AUTO: 91.3 FL (ref 81–99)
PLATELET # BLD: 209 THOU/MM3 (ref 130–400)
PMV BLD AUTO: 10.8 FL (ref 9.4–12.4)
POTASSIUM SERPL-SCNC: 4.3 MEQ/L (ref 3.5–5.2)
RBC # BLD: 4.38 MILL/MM3 (ref 4.2–5.4)
SODIUM BLD-SCNC: 143 MEQ/L (ref 135–145)
WBC # BLD: 4 THOU/MM3 (ref 4.8–10.8)

## 2019-06-03 PROCEDURE — 80048 BASIC METABOLIC PNL TOTAL CA: CPT

## 2019-06-03 PROCEDURE — 36415 COLL VENOUS BLD VENIPUNCTURE: CPT

## 2019-06-03 PROCEDURE — 85027 COMPLETE CBC AUTOMATED: CPT

## 2019-06-10 ENCOUNTER — OFFICE VISIT (OUTPATIENT)
Dept: FAMILY MEDICINE CLINIC | Age: 74
End: 2019-06-10
Payer: MEDICARE

## 2019-06-10 ENCOUNTER — NURSE ONLY (OUTPATIENT)
Dept: LAB | Age: 74
End: 2019-06-10

## 2019-06-10 VITALS
BODY MASS INDEX: 38.05 KG/M2 | RESPIRATION RATE: 10 BRPM | WEIGHT: 206.8 LBS | SYSTOLIC BLOOD PRESSURE: 118 MMHG | HEIGHT: 62 IN | TEMPERATURE: 98 F | HEART RATE: 55 BPM | DIASTOLIC BLOOD PRESSURE: 78 MMHG | OXYGEN SATURATION: 96 %

## 2019-06-10 DIAGNOSIS — E78.5 ELEVATED LIPIDS: ICD-10-CM

## 2019-06-10 DIAGNOSIS — M15.9 PRIMARY OSTEOARTHRITIS INVOLVING MULTIPLE JOINTS: ICD-10-CM

## 2019-06-10 DIAGNOSIS — M54.50 CHRONIC BILATERAL LOW BACK PAIN WITHOUT SCIATICA: ICD-10-CM

## 2019-06-10 DIAGNOSIS — Z79.899 CONTROLLED SUBSTANCE AGREEMENT SIGNED: ICD-10-CM

## 2019-06-10 DIAGNOSIS — I50.23 HYPERTENSIVE HEART DISEASE WITH ACUTE ON CHRONIC SYSTOLIC CONGESTIVE HEART FAILURE (HCC): ICD-10-CM

## 2019-06-10 DIAGNOSIS — I10 ESSENTIAL (PRIMARY) HYPERTENSION: ICD-10-CM

## 2019-06-10 DIAGNOSIS — I10 ESSENTIAL HYPERTENSION: Primary | ICD-10-CM

## 2019-06-10 DIAGNOSIS — J45.20 MILD INTERMITTENT ASTHMA WITHOUT COMPLICATION: ICD-10-CM

## 2019-06-10 DIAGNOSIS — R79.83 HOMOCYSTEINEMIA: ICD-10-CM

## 2019-06-10 DIAGNOSIS — K90.89 OTHER INTESTINAL MALABSORPTION: ICD-10-CM

## 2019-06-10 DIAGNOSIS — G89.29 CHRONIC BILATERAL LOW BACK PAIN WITHOUT SCIATICA: ICD-10-CM

## 2019-06-10 DIAGNOSIS — E78.5 DYSLIPIDEMIA: ICD-10-CM

## 2019-06-10 DIAGNOSIS — I10 ESSENTIAL HYPERTENSION: ICD-10-CM

## 2019-06-10 DIAGNOSIS — F41.1 GAD (GENERALIZED ANXIETY DISORDER): ICD-10-CM

## 2019-06-10 DIAGNOSIS — I25.10 CORONARY ARTERY DISEASE INVOLVING NATIVE CORONARY ARTERY OF NATIVE HEART WITHOUT ANGINA PECTORIS: ICD-10-CM

## 2019-06-10 DIAGNOSIS — J81.0 PULMONARY EDEMA, ACUTE (HCC): ICD-10-CM

## 2019-06-10 DIAGNOSIS — I11.0 HYPERTENSIVE HEART DISEASE WITH ACUTE ON CHRONIC SYSTOLIC CONGESTIVE HEART FAILURE (HCC): ICD-10-CM

## 2019-06-10 DIAGNOSIS — I50.1 HEART FAILURE, LEFT, WITH LVEF <=30% (HCC): ICD-10-CM

## 2019-06-10 LAB
BASOPHILS # BLD: 1.4 %
BASOPHILS ABSOLUTE: 0.1 THOU/MM3 (ref 0–0.1)
CALCIUM SERPL-MCNC: 9.8 MG/DL (ref 8.5–10.5)
EOSINOPHIL # BLD: 9.2 %
EOSINOPHILS ABSOLUTE: 0.3 THOU/MM3 (ref 0–0.4)
ERYTHROCYTE [DISTWIDTH] IN BLOOD BY AUTOMATED COUNT: 12.5 % (ref 11.5–14.5)
ERYTHROCYTE [DISTWIDTH] IN BLOOD BY AUTOMATED COUNT: 41.2 FL (ref 35–45)
ESTRADIOL LEVEL: 13.7 PG/ML
HCT VFR BLD CALC: 38.5 % (ref 37–47)
HEMOGLOBIN: 13.1 GM/DL (ref 12–16)
IMMATURE GRANS (ABS): 0 THOU/MM3 (ref 0–0.07)
IMMATURE GRANULOCYTES: 0 %
LYMPHOCYTES # BLD: 41.2 %
LYMPHOCYTES ABSOLUTE: 1.5 THOU/MM3 (ref 1–4.8)
MAGNESIUM: 2.5 MG/DL (ref 1.6–2.4)
MCH RBC QN AUTO: 31 PG (ref 26–33)
MCHC RBC AUTO-ENTMCNC: 34 GM/DL (ref 32.2–35.5)
MCV RBC AUTO: 91 FL (ref 81–99)
MONOCYTES # BLD: 8.6 %
MONOCYTES ABSOLUTE: 0.3 THOU/MM3 (ref 0.4–1.3)
NUCLEATED RED BLOOD CELLS: 0 /100 WBC
PLATELET # BLD: 176 THOU/MM3 (ref 130–400)
PMV BLD AUTO: 10.8 FL (ref 9.4–12.4)
PTH INTACT: 51.1 PG/ML (ref 15–65)
RBC # BLD: 4.23 MILL/MM3 (ref 4.2–5.4)
SEDIMENTATION RATE, ERYTHROCYTE: 20 MM/HR (ref 0–20)
SEG NEUTROPHILS: 39.6 %
SEGMENTED NEUTROPHILS ABSOLUTE COUNT: 1.4 THOU/MM3 (ref 1.8–7.7)
VITAMIN D 25-HYDROXY: 44 NG/ML (ref 30–100)
WBC # BLD: 3.6 THOU/MM3 (ref 4.8–10.8)

## 2019-06-10 PROCEDURE — 99214 OFFICE O/P EST MOD 30 MIN: CPT | Performed by: FAMILY MEDICINE

## 2019-06-10 PROCEDURE — 1111F DSCHRG MED/CURRENT MED MERGE: CPT | Performed by: FAMILY MEDICINE

## 2019-06-10 PROCEDURE — G8417 CALC BMI ABV UP PARAM F/U: HCPCS | Performed by: FAMILY MEDICINE

## 2019-06-10 PROCEDURE — G8399 PT W/DXA RESULTS DOCUMENT: HCPCS | Performed by: FAMILY MEDICINE

## 2019-06-10 PROCEDURE — 1123F ACP DISCUSS/DSCN MKR DOCD: CPT | Performed by: FAMILY MEDICINE

## 2019-06-10 PROCEDURE — 4040F PNEUMOC VAC/ADMIN/RCVD: CPT | Performed by: FAMILY MEDICINE

## 2019-06-10 PROCEDURE — 1036F TOBACCO NON-USER: CPT | Performed by: FAMILY MEDICINE

## 2019-06-10 PROCEDURE — 1090F PRES/ABSN URINE INCON ASSESS: CPT | Performed by: FAMILY MEDICINE

## 2019-06-10 PROCEDURE — G8598 ASA/ANTIPLAT THER USED: HCPCS | Performed by: FAMILY MEDICINE

## 2019-06-10 PROCEDURE — 3017F COLORECTAL CA SCREEN DOC REV: CPT | Performed by: FAMILY MEDICINE

## 2019-06-10 PROCEDURE — G8427 DOCREV CUR MEDS BY ELIG CLIN: HCPCS | Performed by: FAMILY MEDICINE

## 2019-06-10 ASSESSMENT — ENCOUNTER SYMPTOMS
APNEA: 1
SHORTNESS OF BREATH: 1

## 2019-06-10 NOTE — PROGRESS NOTES
[address] [facility]. [address] [facility] [address]  Dept: [phone]  Dept Fax: [phone]  Loc: [phone]      Donald Gaming is a 68 y.o. White female. Zelalem Dimasite  presents to the [facility] today for   Chief Complaint   Patient presents with   Reece sesay protocol-  not seen since 2016    Congestive Heart Failure     5/16/2019 enlarged with EF 20-30%,  in the day before for stent, noticed when went to get snacks, drove home, had trouble getting out of seat belt   ,  and;   1. Essential hypertension    2. Homocysteinemia ([address]    3. Heart failure, left, with LVEF <=30% ([address]    4. Hypertensive heart disease with acute on chronic systolic congestive heart failure ([address]    5. Pulmonary edema, acute ([address]    6. Coronary artery disease involving native coronary artery of native heart without angina pectoris    7. Dyslipidemia    8. Primary osteoarthritis involving multiple joints    9. Controlled substance agreement signed; tramadol    10. Essential (primary) hypertension     11. Chronic bilateral low back pain without sciatica    12. LYLA (generalized anxiety disorder)    13. Mild intermittent asthma without complication    14. Other intestinal malabsorption      I have reviewed Donald Gaming medical, surgical and other pertinent history in detail, and have updated medication and allergy information in the computerized patientrecord.      Clinical Care Team:     -Referring Provider for today's consult: Self Referred  -Primary Care Provider: Zeke Way DO    Medical/Surgical History:   She  has a past medical history of Asthma, mild intermittent, CAD (coronary artery disease), Chronic low back pain, Controlled substance agreement signed; tramadol, Dyslipidemia, LYLA (generalized anxiety disorder), Heart failure, left, with LVEF <=30% ([address] History of gastroesophageal reflux (GERD), Homocysteinemia ([facility] Hypertension, Left bundle branch block, Obesity (BMI 30-39.9), SUNI on CPAP, Osteoarthritis, Osteopenia, Seasonal allergies, and Vitamin D deficiency. Her  has a past surgical history that includes sinus surgery (1990); Rotator cuff repair (Bilateral, 2006  & 2009); Carpal tunnel release (08/2006); Rotator cuff repair (11/2009); Cardiac catheterization (10/30/14); eye surgery (Left, 04/23/2016); eye surgery (Right, 05/24/2006); Carpal tunnel release (Left, 2009); and Colonoscopy (2007). Family/Social History:     Her family history includes Cancer in her brother and father; Dementia in her brother; Diabetes in her maternal grandmother; Glaucoma in her brother; Heart Disease in her maternal grandmother and mother. She  reports that she is a non-smoker but has been exposed to tobacco smoke. She has never used smokeless tobacco. She reports that she does not drink alcohol or use drugs.     Medications/Allergies/Immunizations:     Her current medication(s) include   Current Outpatient Medications:     Cinnamon 500 MG CAPS, Take 500 mg by mouth 3 times daily , Disp: , Rfl:     fluticasone (FLONASE) 50 MCG/ACT nasal spray, 1 spray by Each Nare route daily as needed for Rhinitis, Disp: , Rfl:     traMADol (ULTRAM) 50 MG tablet, Take 50 mg by mouth daily as needed for Pain., Disp: , Rfl:     carvedilol (COREG) 6.25 MG tablet, Take 1 tablet by mouth 2 times daily (with meals), Disp: 60 tablet, Rfl: 3    sacubitril-valsartan (ENTRESTO) 24-26 MG per tablet, Take 1 tablet by mouth 2 times daily, Disp: 60 tablet, Rfl: 1    bumetanide (BUMEX) 2 MG tablet, Take 1 tablet by mouth daily, Disp: 30 tablet, Rfl: 3    potassium chloride (KLOR-CON M) 10 MEQ extended release tablet, Take 1 tablet by mouth 2 times daily, Disp: 60 tablet, Rfl: 3    Cholecalciferol (VITAMIN D3) 2000 units CAPS, Take 1 capsule by mouth daily , Disp: , Rfl:     cetirizine (ZYRTEC) 10 MG tablet, Take 10 mg by mouth daily as needed for Allergies, Disp: , Rfl:     Lactobacillus-Inulin ([address]) CAPS, Take 1 capsule by mouth daily , Disp: , Rfl:     Magnesium Oxide 500 MG CAPS, Take by mouth daily For more movements, Disp: , Rfl:     nitroGLYCERIN (NITROSTAT) 0.4 MG SL tablet, Place 0.4 mg under the tongue every 5 minutes as needed for Chest pain., Disp: , Rfl:     albuterol-ipratropium (COMBIVENT RESPIMAT)  MCG/ACT AERS inhaler, Inhale 1 puff into the lungs every 6 hours as needed for Wheezing., Disp: , Rfl:     aspirin 81 MG tablet, Take 81 mg by mouth daily. , Disp: , Rfl:     pravastatin (PRAVACHOL) 40 MG tablet, Take 40 mg by mouth daily. , Disp: , Rfl:     Omega-3 1400 MG CAPS, Take 1 capsule by mouth 2 times daily [facility] #[id number]  before and as finish breakfast and supper, Disp: , Rfl:     sertraline (ZOLOFT) 50 MG tablet, Take 50 mg by mouth daily. , Disp: , Rfl:     folic acid (V-R FOLIC ACID) 077 MCG tablet, Take 1 tablet by mouth daily, Disp: 90 tablet, Rfl: 3  Allergies: Duricef [cefadroxil],  Immunizations:   Immunization History   Administered Date(s) Administered    Influenza Vaccine, unspecified formulation 12/21/2016    Influenza Virus Vaccine 12/02/2015    Influenza, Triv, inactivated, subunit, adjuvanted, IM (Fluad 65 yrs and older) 11/29/2018    Pneumococcal 13-valent Conjugate ([id number]) 12/02/2015    Pneumococcal Polysaccharide ([id number]) 10/12/2011    Tdap (Boostrix, Adacel) 07/12/2015    Zoster Live (Zostavax) 10/13/2014        History of PresentIllness:     Alana's had concerns including Established New Doctor (lázaro protocol-  not seen since 2016) and Congestive Heart Failure (5/16/2019 enlarged with EF 20-30%,  in the day before for stent, noticed when went to get snacks, drove home, had trouble getting out of seat belt). William Elizabeth  presents to the [address] today for;   Chief Complaint   Patient presents with   Nathan sesay protocol-  not seen since 2016    Congestive Heart Failure     5/16/2019 enlarged with EF 20-30%,  in the day before for stent, noticed when went to get snacks, drove home, had trouble getting out of seat belt   , ,  abnormal labs follow up and these conditions as she  Is looking today for:     1. Essential hypertension    2. Homocysteinemia ([facility]    3. Heart failure, left, with LVEF <=30% ([facility]    4. Hypertensive heart disease with acute on chronic systolic congestive heart failure ([address]    5. Pulmonary edema, acute ([facility]    6. Coronary artery disease involving native coronary artery of native heart without angina pectoris    7. Dyslipidemia    8. Primary osteoarthritis involving multiple joints    9. Controlled substance agreement signed; tramadol    10. Essential (primary) hypertension     11. Chronic bilateral low back pain without sciatica    12. LYLA (generalized anxiety disorder)    13. Mild intermittent asthma without complication    14. Other intestinal malabsorption      HPI    Subjective:     Review of Systems   Constitutional: Positive for fatigue and unexpected weight change. Respiratory: Positive for apnea and shortness of breath. Cardiovascular: Positive for leg swelling. Psychiatric/Behavioral: Positive for sleep disturbance. All other systems reviewed and are negative. Objective:     /78 (Site: Right Upper Arm, Position: Sitting, Cuff Size: Medium Adult)   Pulse 55   Temp 98 °F (36.7 °C) (Oral)   Resp 10   Ht 5' 2.09\" (1.577 m)   Wt 206 lb 12.8 oz (93.8 kg)   SpO2 96%   BMI 37.72 kg/m²   Physical Exam   Constitutional: She is oriented to person, place, and time. She appears well-developed and well-nourished. HENT:   Head: Normocephalic. Pulmonary/Chest: Effort normal.   Neurological: She is alert and oriented to person, place, and time. Psychiatric: She has a normal mood and affect. Thought content normal.   Nursing note and vitals reviewed.          Laboratory Data:   Lab results were [facility] in [facility] and/or those brought by the pateint were reviewed today with Dimitri Radhika and she has a copy of their most recent labs to take home with them as notedbelow;       Imaging Data:   Imaging Data:       Assessment & Plan:       Impression:  1. Essential hypertension    2. Homocysteinemia ([facility]    3. Heart failure, left, with LVEF <=30% ([address]    4. Hypertensive heart disease with acute on chronic systolic congestive heart failure ([address]    5. Pulmonary edema, acute ([facility]    6. Coronary artery disease involving native coronary artery of native heart without angina pectoris    7. Dyslipidemia    8. Primary osteoarthritis involving multiple joints    9. Controlled substance agreement signed; tramadol    10. Essential (primary) hypertension     11. Chronic bilateral low back pain without sciatica    12. LLYA (generalized anxiety disorder)    13. Mild intermittent asthma without complication    14. Other intestinal malabsorption      Assessment and Plan:  After reviewing the patients chief complaints, reviewing their labfindings in great detail (with the patient and those accompanying them) which correlate to their chief complaints, symptoms, and or medical conditions; suggestions were made relating to changes in diet and or supplementswhich may improve the complaints and which will be reflected in their future lab findings; Chief Complaint   Patient presents with   [facility] Doctor sesay protocol-  not seen since 2016    Congestive Heart Failure     5/16/2019 enlarged with EF 20-30%,  in the day before for stent, noticed when went to get snacks, drove home, had trouble getting out of seat belt   ;    Plans for the next visits:  - Abnormal and non-optimal Labs were ordered today to be repeated in the next 120-365 days to assess changes from adjustments in nutrition and or nutrients.    - Patient instructed when having ablood draw to ask the  to divide if desired    - [email or web address] web site offered to patient to review at their convenience by staff with login information    Note:  I have discussed with the patient that with all nutraceuticals, there is often mixed data and emerging research which needs to be monitored; as well as an array of NIHfact sheets on nutrients and supplements. If I have recommended cinnamon at the request of this patient to assist them in control of their blood sugar, triglyceride and or weight issues. I discussed that thepatient's clinical use of cinnamon bark, calcium, magnesium, Vitamin D and pharmaceutical grade [facility] #369458 fish oil or triple-strength fish oil, and B-75 two phase time-released B complex by Silviano Watson will be for atime-limited trial to determine their individual effectiveness and safety in this patient. I also referred the patient to the NMCD: Nutrition, Metabolism, and Cardiovascular Diseases (journal) and concerns about long-termuse and hepatotoxicity of cinnamon and other nutrients and suggest they frequently search nih.gov for the latest non-proprietary information on nutriceuticals as well as consider a subscription to [employer] fordetails on reviewed supplements, or at the least review the nutrient files at [address] at [facility], [facility], an insulin mimetic, reduces some High Carbohydrate Dietary Impacts. Methylhydroxychalcone polymers insulin-enhancing properties in fat cells are responsible for enhanced glucose uptake, inhibiting hepatic HMG-CoA reductase and lowers lipids. www.jacn. org/content/20/4/327.full     But cinnamon with additivessuch as Chromium or Cinnamon Extract are not effective as insulin mimetics.  https://www.hannah.net/     Nutrients for Start up from [employer] or [employer] for ease to get started now ;  Gabriel Serna has some useable products;  - Triple Strength Fish Oil, enteric coated  - Vit D 3 5000 IU gel caps  - Iron ferrous sulfat 325 mg tabs  - Centrum Silver look-a-like for most patients, or  - Centrum plain look-a-like if need iron    Localpharmacies or chains such as [facility], [facility], [facility], have;  - Triple Strength Fish Oil (enteric coated ifavailable) or    If not enteric coated, can take from freezer for less burps  - B-50 or B-100 time released balanced B complex tabs  - Cinnamon bark 500 mg (without Chromium or extracts)   some brands list 1000 mg / serving of 2 capsules,    some brands have 1000 mg caps with the undesireable chromium / extract  - Calcium carbonate/citrate, magnesium oxide/citrate, Vit D 3  as 3-4 tabs/caps/serving     Some Local Brands may contain Zincwhich is acceptable for the first bottle or two  - Magnesium oxide 250 mg tabs for those having < 2 bowel movements daily  - Magnesium citrate 200 mg if having > 2bowel movement/day  - Centrum Silver or look-a-like for most patients, Centrum plain or look-a-like with iron  - Vitamin D-3 comes as 1,000 IU or 2,000 IU or 5,000 IU gel caps or Liquid drops      Some brands containing or derived from soy oil or corn oil are OK if not allergic to soy  - Elemental Iron 65 mg tabsat bedtime is available over the counter if need more iron     Usually turns bowel movements grey, green or black but not a concern  - Apricot Kernel Oil (by Now) for dry skin sensitive perineal or perianal area skin    Nutrients for ongoing use by Mail order for less expense from [employer]. puritan.com ;  - Triple Strength Fish Oil , 240 Softgels Item [id number]  -B-100 time released balanced B complex Item #[id number]  - Cinnamon bark 500 mg without Chromium or extract Item #[id number]  - Calcium carbonate 1000 mg, Magnesium oxide 500 mg, Vit D 3  400 IU Item #[id number]  - Magnesium oxide 500 mg tabs Item #[id number] if less than 2 bowel movements daily  - [employer] Sen[employer] Item #[id number] for mostpatients, One Daily Item #808078 with iron  - Vit D 3  1,000 Item #[id number]      2,000 IU latex-like proteins and latex from rubber products since many people are allergic to fruit, vegetables and latex. Read labels on pre-packaged foods. This list to avoid is only a guide if you are known allergicto latex or have a latex rash on your chin, cheeks and lines on your neck and chest. The amount of latex is different in each food product or fruit variety. Foods to Avoid out of Season if not grown locally: Melon, Nectarine, Papaya, Cherry, Passion fruit, Plum, Chestnuts, and Tomato. Avocado, Banana, Celery, Figs, and Kiwi always contain Latex-like protein. Whats in Season? Strawberries taste better in June than December because June is strawberry season so buy locally grown produce \"in season\" for the best flavor, cost and less Latex. Locally grown produce notonly tastes great requires little of no ethylene exposure in food distribution so has less latex content. Out of season, use canned, frozen or dried sinceprocessed ripe and are latex lower!!!   Month     [place] LocallyGrown Produce  January, February, March: use canned, frozen or dried fruits since lower in latex  April; asparagus, radishes  May; asparagus, broccoli, green onions, greens, peas, radishes,rhubarb  June; asparagus, beets, beans, broccoli, cabbage, cantaloupe, carrots, green onions, greens, lettuce,onions, parsley, peas, radishes, rhubarb, strawberries, watermelons  July; beans, beets, blueberries,broccoli, cabbage, cantaloupe, carrots, cauliflower, celery, cucumbers, eggplant, grapes, green onions, greens, lettuce, onions, parsley, peas, peaches, bell peppers, potatoes, radishes, summer raspberries, squash, sweetcorn, tomatoes, turnips, watermelons  August; apples, beans, beets, blueberries, cabbage, cantaloupe, carrots,cauliflower, celery, cucumbers, eggplant, grapes, green onions, greens, lettuce, onions, parsley, peas, peaches, pears, bell peppers, potatoes, radishes, squash, sweet corn, tomatoes, turnips, watermelons  September; apples, beans, beets, blueberries, cabbage, cantaloupe, carrots, cauliflower, celery, cucumbers, eggplant, grapes,green onions, greens, lettuce, onions, parsley, peas, peaches, pears, bell peppers, plums, potatoes, pumpkins, radishes, fall red raspberries, squash, sweet corn, tomatoes, turnips, watermelons  October; apples, beets, broccoli, cabbage, carrots, cauliflower, celery, green onions, greens, lettuce, parsley, peas, pears, potatoes,pumpkins, radishes, fall red raspberries, squash, turnips  November; broccoli, cabbage, carrots, parsley,pears, peas  December: use canned, frozen ordried fruits since lower in latex    Upto half of latex-sensitive patients show allergic reactions to fruits (avocados, bananas, kiwifruits, papayas, peaches),   Annals of Allergy, 1994. These plants contain the same proteins that are allergens in latex. People with fruit allergies should warn physicians beforeundergoing procedures which may cause anaphylactic reaction if in contact with latex gloves. Some of the common foods with defined cross-reactivity to latexare avocado, banana, kiwi, chestnut, raw potato, tomato,stone fruits (e.g., peach, cherry), hazelnut, melons, celery, carrot, apple, pear, papaya, and almond. Foods with less well-defined cross-reactivity to latex are peanuts, peppers, citrus fruits, coconut, pineapple, john,fig, passion fruit, Ugli fruit, and grape    This fruit/latex cross-reactivity is worsened by ethylene, a gas used to hasten commercial ripening. In nature, plants produce low levels of the hormone ethylene, which regulates germination, flowering, and ripening. Forced ripening by high ethyleneconcentrations, plants produce allergenic wound-repair proteins, which are similar to wound-repair proteins made during the tapping of rubber trees. Sensitive individualswho ingest the fruit get a higher dose and worse reaction. Some people may even first become sensitized to latex through fruit.   Can food processing increase theconcentrations of allergenic proteins? Latex-sensitized children (and adults) in [place] often experience allergic reactions after eating bananas ripenedartificially with ethylene. In the [place], food distribution centers treat unripe bananas and other produce with ethylene to ripen; not commonly done in [facility] since fruit is tree-ripened there. Does treatmentof food with ethylene induce banana proteins that cross-react with latex? (Blayne et al.    References:   Latex in Foods Allergy, http://ehp.niehs.nih.gov/members/2003/5811/5811.html    Search web for \" Whats in Season \" for whereyou live or are at the time you food shop  www.nutritioncouncil.org/pdf/healthy/SeasonalProduce. pdf ,   Management of Latex, http://medicalcenter. osu.edu/  search for latex

## 2019-06-10 NOTE — PATIENT INSTRUCTIONS
Thank you   1. Thank you for trusting us with your healthcare needs. You may receive a survey regarding today's visit. It would help us out if you would take a few moments to provide your feedback. We value your input. 2. Please bring in ALL medications BOTTLES, including inhalers, herbal supplements, over the counter, prescribed & non-prescribed medicine. The office would like actual medication bottles and a list.   3. Please note our OFFICE POLICIES:   a. Prior to getting your labs drawn, please check with your insurance company for benefits and eligibility of lab services. Often, insurance companies cover certain tests for preventative visits only. It is patient's responsibility to see what is covered. b. We are unable to change a diagnosis after the test has been performed. c. Lab orders will not be re-printed. Please hold onto your original lab orders and take them to your lab to be completed. d. If you no show your scheduled appointment three times, you will be dismissed from this practice. e. Marleta [place] must be completed 24 hours prior to your schedule appointment. 4. If the list below has been completed, PLEASE FAX RECORDS TO OUR OFFICE @ [phone].  Once the records have been received we will update your records at our office:  Health Maintenance Due   Topic Date Due    Hepatitis C screen  1945    Shingles Vaccine (2 of 3) 12/08/2014    Colon cancer screen colonoscopy  08/17/2015    Breast cancer screen  07/17/2018

## 2019-06-12 LAB
C-REACTIVE PROTEIN, HIGH SENSITIVITY: 1.3 MG/L
HOMOCYSTEINE, TOTAL: 28 UMOL/L

## 2019-06-14 LAB
DHEA UNCONJUGATED: 1.36 NG/ML (ref 0.63–4.7)
DHEAS (DHEA SULFATE): 70 UG/DL (ref 10–90)
TESTOSTERONE, FREE W SHGB, FEMALES/CHILDREN: NORMAL

## 2019-06-14 NOTE — DISCHARGE SUMMARY
[facility]     Discharge Summary    Patient:  Abhishek Padilla  YOB: 1945    MRN: [id number]   Acct: [de-identified]    Primary Care Physician: Fanta Harrison DO    Admit date:  5/16/2019    Discharge date:  5/22/2019       Discharge Diagnoses:   Heart failure, left, with LVEF <=30% (HCC)  Principal Problem:    Heart failure, left, with LVEF <=30% (HCC)  Active Problems:    Pulmonary edema, acute ([address]    Hypertensive heart disease with congestive heart failure ([address]  Resolved Problems:    * No resolved hospital problems. *        Admitted for: (HPI) History obtained from the patient.     The patient is a 68 y.o. female who presents from home with complaints of progressive shortness of breath.     Context: Pt was apparently in her usual state of health until day of presentation. She had been visiting with her  who is an in-patient and was on her way home when symptoms started. She reported feeling unwell and had difficulty locating the vending machine in the hospital, even though she had used if before. She eventually gave up and decided to go home for something to eat, by the time she got to her car, she was exhausted and dyspneic, she still drove home but on getting there, she could not make it to her door, so she called EMS. On further prompting, she admitted that she has been missing her medications over the last couple of days.     Significant findings on admission include: /87, SaO2 88%, BUN 23, BNP 1,379, patchy infiltrates in R lower lobe on CXR. Hospital Course: Patient was admitted with a primary diagnosis of Heart failure, left, with LVEF <=30% ([address] after presenting with acute pulmonary edema. She was treated with diuresis and adjustment of her cardiac medications, fitted with an external automatic defibrillator and discharged in stable condition for further OP follow up.     Consults: cardiology    Discharge Medications:       Medication List START taking these medications    bumetanide 2 MG tablet  Commonly known as:  BUMEX  Take 1 tablet by mouth daily     carvedilol 6.25 MG tablet  Commonly known as:  COREG  Take 1 tablet by mouth 2 times daily (with meals)     potassium chloride 10 MEQ extended release tablet  Commonly known as:  KLOR-CON M  Take 1 tablet by mouth 2 times daily     sacubitril-valsartan 24-26 MG per tablet  Commonly known as:  ENTRESTO  Take 1 tablet by mouth 2 times daily        CONTINUE taking these medications    aspirin 81 MG tablet     cetirizine 10 MG tablet  Commonly known as:  ZYRTEC     Cinnamon 500 MG Caps     COMBIVENT RESPIMAT  MCG/ACT Aers inhaler  Generic drug:  albuterol-ipratropium     fluticasone 50 MCG/ACT nasal spray  Commonly known as:  FLONASE     lactobacillus Caps capsule     Magnesium Oxide 500 MG Caps     nitroGLYCERIN 0.4 MG SL tablet  Commonly known as:  NITROSTAT     Omega-3 1400 MG Caps     pravastatin 40 MG tablet  Commonly known as:  PRAVACHOL     sertraline 50 MG tablet  Commonly known as:  ZOLOFT     traMADol 50 MG tablet  Commonly known as:  ULTRAM     Vitamin D3 2000 units Caps        STOP taking these medications    b complex vitamins capsule     diltiazem 120 MG extended release capsule  Commonly known as:  CARDIZEM 12 HR     isosorbide mononitrate 30 MG extended release tablet  Commonly known as:  IMDUR     MOBIC 15 MG tablet  Generic drug:  meloxicam           Where to Get Your Medications      These medications were sent to [facility] [phone] [address] , [facility] [address]    Phone:  [phone]   · bumetanide 2 MG tablet  · carvedilol 6.25 MG tablet  · potassium chloride 10 MEQ extended release tablet  · sacubitril-valsartan 24-26 MG per tablet           Vitals:  Vitals:    05/21/19 2306 05/22/19 0340 05/22/19 0345 05/22/19 0812   BP: 126/70 128/60  121/63   Pulse: 71 71  66   Resp: 16 16  16   Temp: 98.1 °F (36.7 °C) 97.9 °F (36.6 °C)  97.5 °F (36.4 °C)   TempSrc: Oral Oral  Oral   SpO2: 96% 94%  96%   Weight:   211 lb (95.7 kg)    Height:         Weight: Weight: 211 lb (95.7 kg)     24 hour intake/output:No intake or output data in the 24 hours ending 06/14/19 1011    Discharge Exam:  General appearance: alert, appears stated age and cooperative  Neck: no carotid bruit, no JVD and supple, symmetrical, trachea midline  Lungs: clear to auscultation bilaterally  Abdomen: soft, non-tender; bowel sounds normal; no masses,  no organomegaly  Extremities: extremities normal, atraumatic, no cyanosis or edema  Pulses: 2+ and symmetric  Skin: Skin color, texture, turgor normal. No rashes or lesions  Neurologic: Grossly normal          Procedures:  LifeVest placement    Significant Diagnostic Studies: labs:      Radiology reports as per the Radiologist  Radiology: No results found.      Results for orders placed or performed during the hospital encounter of 81/14/01   Basic Metabolic Panel w/ Reflex to MG   Result Value Ref Range    Sodium 142 135 - 145 meq/L    Potassium reflex Magnesium 4.1 3.5 - 5.2 meq/L    Chloride 107 98 - 111 meq/L    CO2 19 (L) 23 - 33 meq/L    Glucose 110 (H) 70 - 108 mg/dL    BUN 23 (H) 7 - 22 mg/dL    CREATININE 0.8 0.4 - 1.2 mg/dL    Calcium 9.3 8.5 - 10.5 mg/dL   Brain Natriuretic Peptide   Result Value Ref Range    Pro-BNP 1379.0 (H) 0.0 - 900.0 pg/mL   CBC Auto Differential   Result Value Ref Range    WBC 6.3 4.8 - 10.8 thou/mm3    RBC 4.90 4.20 - 5.40 mill/mm3    Hemoglobin 14.8 12.0 - 16.0 gm/dl    Hematocrit 45.0 37.0 - 47.0 %    MCV 91.8 81.0 - 99.0 fL    MCH 30.2 26.0 - 33.0 pg    MCHC 32.9 32.2 - 35.5 gm/dl    RDW-CV 12.7 11.5 - 14.5 %    RDW-SD 42.4 35.0 - 45.0 fL    Platelets 482 796 - 477 thou/mm3    MPV 9.5 9.4 - 12.4 fL    Seg Neutrophils 59.0 %    Lymphocytes 30.1 %    Monocytes 6.1 %    Eosinophils 4.0 %    Basophils 0.6 %    Immature Granulocytes 0.2 %    Segs Absolute 3.7 1.8 - 7.7 thou/mm3 11.5 - 14.5 %    RDW-SD 43.6 35.0 - 45.0 fL    Platelets 641 407 - 164 thou/mm3    MPV 10.1 9.4 - 12.4 fL   Comprehensive Metabolic Panel w/ Reflex to MG   Result Value Ref Range    Glucose 96 70 - 108 mg/dL    CREATININE 0.7 0.4 - 1.2 mg/dL    BUN 28 (H) 7 - 22 mg/dL    Sodium 142 135 - 145 meq/L    Potassium reflex Magnesium 3.8 3.5 - 5.2 meq/L    Chloride 106 98 - 111 meq/L    CO2 24 23 - 33 meq/L    Calcium 8.9 8.5 - 10.5 mg/dL    AST 17 5 - 40 U/L    Alkaline Phosphatase 63 38 - 126 U/L    Total Protein 6.5 6.1 - 8.0 g/dL    Alb 3.8 3.5 - 5.1 g/dL    Total Bilirubin 1.0 0.3 - 1.2 mg/dL    ALT 10 (L) 11 - 66 U/L   Lipid panel   Result Value Ref Range    Cholesterol, Total 164 100 - 199 mg/dL    Triglycerides 80 0 - 199 mg/dL    HDL 55 mg/dL    LDL Calculated 93 mg/dL   Anion Gap   Result Value Ref Range    Anion Gap 12.0 8.0 - 16.0 meq/L   Glomerular Filtration Rate, Estimated   Result Value Ref Range    Est, Glom Filt Rate 82 (A) ml/min/1.73m2   EKG 12 Lead   Result Value Ref Range    Ventricular Rate 92 BPM    Atrial Rate 92 BPM    P-R Interval 174 ms    QRS Duration 172 ms    Q-T Interval 430 ms    QTc Calculation (Bazett) 531 ms    P Axis -5 degrees    R Axis 23 degrees    T Axis 127 degrees   Electrocardiogram, 12-lead   Result Value Ref Range    Ventricular Rate 69 BPM    Atrial Rate 69 BPM    P-R Interval 206 ms    QRS Duration 172 ms    Q-T Interval 466 ms    QTc Calculation (Bazett) 499 ms    P Axis 0 degrees    R Axis 26 degrees    T Axis 134 degrees   ECHO Complete 2D W Doppler W Color   Result Value Ref Range    Left Ventricular Ejection Fraction 28     LVEF MODALITY ECHO        Diet:  No diet orders on file    Activity:  Activity as tolerated (Patient may move about with assist as indicated or with supervision.)    Follow-up:  in the next few days with Brian Valencia DO,  in the next few weeks with cardiology    Disposition: home    Condition: Stable    Time Spent: 40 minutes    Electronically signed by Bernabe Guerrero MD on 6/14/2019 at 10:11 AM    Attending Physician

## 2019-06-24 ENCOUNTER — OFFICE VISIT (OUTPATIENT)
Dept: FAMILY MEDICINE CLINIC | Age: 74
End: 2019-06-24
Payer: MEDICARE

## 2019-06-24 VITALS
HEIGHT: 62 IN | TEMPERATURE: 98.4 F | RESPIRATION RATE: 10 BRPM | HEART RATE: 63 BPM | DIASTOLIC BLOOD PRESSURE: 70 MMHG | WEIGHT: 202.8 LBS | SYSTOLIC BLOOD PRESSURE: 118 MMHG | OXYGEN SATURATION: 96 % | BODY MASS INDEX: 37.32 KG/M2

## 2019-06-24 DIAGNOSIS — E78.5 ELEVATED LIPIDS: ICD-10-CM

## 2019-06-24 DIAGNOSIS — I11.0 HYPERTENSIVE HEART DISEASE WITH ACUTE ON CHRONIC SYSTOLIC CONGESTIVE HEART FAILURE (HCC): ICD-10-CM

## 2019-06-24 DIAGNOSIS — I50.1 HEART FAILURE, LEFT, WITH LVEF <=30% (HCC): ICD-10-CM

## 2019-06-24 DIAGNOSIS — I50.23 HYPERTENSIVE HEART DISEASE WITH ACUTE ON CHRONIC SYSTOLIC CONGESTIVE HEART FAILURE (HCC): ICD-10-CM

## 2019-06-24 DIAGNOSIS — J81.0 PULMONARY EDEMA, ACUTE (HCC): ICD-10-CM

## 2019-06-24 DIAGNOSIS — M15.9 PRIMARY OSTEOARTHRITIS INVOLVING MULTIPLE JOINTS: ICD-10-CM

## 2019-06-24 DIAGNOSIS — K90.89 OTHER INTESTINAL MALABSORPTION: ICD-10-CM

## 2019-06-24 DIAGNOSIS — F41.1 GAD (GENERALIZED ANXIETY DISORDER): ICD-10-CM

## 2019-06-24 DIAGNOSIS — R79.83 HOMOCYSTEINEMIA: ICD-10-CM

## 2019-06-24 DIAGNOSIS — E78.5 DYSLIPIDEMIA: ICD-10-CM

## 2019-06-24 DIAGNOSIS — I10 ESSENTIAL (PRIMARY) HYPERTENSION: ICD-10-CM

## 2019-06-24 DIAGNOSIS — I10 ESSENTIAL HYPERTENSION: Primary | ICD-10-CM

## 2019-06-24 PROCEDURE — G8598 ASA/ANTIPLAT THER USED: HCPCS | Performed by: FAMILY MEDICINE

## 2019-06-24 PROCEDURE — 1036F TOBACCO NON-USER: CPT | Performed by: FAMILY MEDICINE

## 2019-06-24 PROCEDURE — 3017F COLORECTAL CA SCREEN DOC REV: CPT | Performed by: FAMILY MEDICINE

## 2019-06-24 PROCEDURE — G8417 CALC BMI ABV UP PARAM F/U: HCPCS | Performed by: FAMILY MEDICINE

## 2019-06-24 PROCEDURE — 99214 OFFICE O/P EST MOD 30 MIN: CPT | Performed by: FAMILY MEDICINE

## 2019-06-24 PROCEDURE — 1123F ACP DISCUSS/DSCN MKR DOCD: CPT | Performed by: FAMILY MEDICINE

## 2019-06-24 PROCEDURE — 4040F PNEUMOC VAC/ADMIN/RCVD: CPT | Performed by: FAMILY MEDICINE

## 2019-06-24 PROCEDURE — G8427 DOCREV CUR MEDS BY ELIG CLIN: HCPCS | Performed by: FAMILY MEDICINE

## 2019-06-24 PROCEDURE — 1090F PRES/ABSN URINE INCON ASSESS: CPT | Performed by: FAMILY MEDICINE

## 2019-06-24 PROCEDURE — G8399 PT W/DXA RESULTS DOCUMENT: HCPCS | Performed by: FAMILY MEDICINE

## 2019-06-24 RX ORDER — ACETAMINOPHEN 500 MG
500 TABLET ORAL NIGHTLY PRN
COMMUNITY
End: 2022-04-08

## 2019-06-24 RX ORDER — VITAMIN B COMPLEX/FOLIC ACID 0.4 MG
1 TABLET ORAL
COMMUNITY

## 2019-06-24 RX ORDER — LEVOMEFOLATE CALCIUM 7.5 MG TABLET
7.5 TABLET
Qty: 60 TABLET | Refills: 5 | Status: SHIPPED | OUTPATIENT
Start: 2019-06-24 | End: 2019-09-24

## 2019-06-24 RX ORDER — OMEGA-3 FATTY ACIDS/FISH OIL 300-1000MG
1 CAPSULE ORAL
Qty: 100 CAPSULE | Refills: 5 | Status: SHIPPED | OUTPATIENT
Start: 2019-06-24

## 2019-06-24 NOTE — PROGRESS NOTES
[address] [facility] [address] [facility] [address]  Dept: [phone]  Dept Fax: [phone]  Loc: [phone]      Good Suero is a 76 y.o. White female. Migdalia Acevedo  presents to the [facility] today for   Chief Complaint   Patient presents with    Follow-up     2 week    Discuss Labs   ,  and;   1. Essential hypertension    2. Homocysteinemia ([address]    3. Heart failure, left, with LVEF <=30% ([address]    4. Hypertensive heart disease with acute on chronic systolic congestive heart failure ([address]    5. Pulmonary edema, acute ([address]    6. Dyslipidemia    7. Elevated lipids    8. Other intestinal malabsorption    9. LYLA (generalized anxiety disorder)    10. Essential (primary) hypertension     11. Primary osteoarthritis involving multiple joints      I have reviewed Good Suero medical, surgical and other pertinent history in detail, and have updated medication and allergy information in the computerized patientrecord. Clinical Care Team:     -Referring Provider for today's consult: Self Referred  -Primary Care Provider: Shy Gonzalez DO    Medical/Surgical History:   She  has a past medical history of Asthma, mild intermittent, CAD (coronary artery disease), Chronic low back pain, Controlled substance agreement signed; tramadol, Dyslipidemia, LYLA (generalized anxiety disorder), Heart failure, left, with LVEF <=30% ([address] History of gastroesophageal reflux (GERD), Homocysteinemia ([address] Hypertension, Left bundle branch block, Obesity (BMI 30-39.9), SUNI on CPAP, Osteoarthritis, Osteopenia, Seasonal allergies, and Vitamin D deficiency. Her  has a past surgical history that includes sinus surgery (1990); Rotator cuff repair (Bilateral, 2006  & 2009); Carpal tunnel release (08/2006); Rotator cuff repair (11/2009);  Cardiac catheterization (10/30/14); eye surgery (Left, 04/23/2016); eye surgery (Right, 05/24/2006); Carpal tunnel release (Left, 2009); and Colonoscopy (2007). Family/Social History:     Her family history includes Cancer in her brother and father; Dementia in her brother; Diabetes in her maternal grandmother; Glaucoma in her brother; Heart Disease in her maternal grandmother and mother. She  reports that she is a non-smoker but has been exposed to tobacco smoke. She has never used smokeless tobacco. She reports that she does not drink alcohol or use drugs.     Medications/Allergies/Immunizations:     Her current medication(s) include   Current Outpatient Medications:     acetaminophen (TYLENOL) 500 MG tablet, Take 500 mg by mouth nightly as needed for Pain, Disp: , Rfl:     Vitamins-Lipotropics (BALANCE B-100) TABS, Take 1 tablet by mouth every morning (before breakfast), Disp: , Rfl:     methylfolate (DEPLIN) 7.5 MG TABS tablet, Take 7.5 tablets by mouth 2 times daily (before meals), Disp: 60 tablet, Rfl: 5    Omega 3 1000 MG CAPS, Take 1 capsule by mouth 4 times daily (before meals and nightly), Disp: 100 capsule, Rfl: 5    folic acid (V-R FOLIC ACID) 079 MCG tablet, Take 1 tablet by mouth daily, Disp: 90 tablet, Rfl: 3    fluticasone (FLONASE) 50 MCG/ACT nasal spray, 1 spray by Each Nare route daily as needed for Rhinitis, Disp: , Rfl:     traMADol (ULTRAM) 50 MG tablet, Take 50 mg by mouth daily as needed for Pain., Disp: , Rfl:     carvedilol (COREG) 6.25 MG tablet, Take 1 tablet by mouth 2 times daily (with meals), Disp: 60 tablet, Rfl: 3    sacubitril-valsartan (ENTRESTO) 24-26 MG per tablet, Take 1 tablet by mouth 2 times daily, Disp: 60 tablet, Rfl: 1    bumetanide (BUMEX) 2 MG tablet, Take 1 tablet by mouth daily, Disp: 30 tablet, Rfl: 3    potassium chloride (KLOR-CON M) 10 MEQ extended release tablet, Take 1 tablet by mouth 2 times daily, Disp: 60 tablet, Rfl: 3    Cholecalciferol (VITAMIN D3) 2000 units CAPS, Take 1 capsule by mouth daily Double Sunday, Disp: , Rfl:     cetirizine (ZYRTEC) 10 MG tablet, Take 10 mg by mouth daily as needed for Allergies, Disp: , Rfl:     Lactobacillus-Inulin ([address]) CAPS, Take 1 capsule by mouth daily , Disp: , Rfl:     Magnesium Oxide 500 MG CAPS, Take by mouth daily For more movements, Disp: , Rfl:     nitroGLYCERIN (NITROSTAT) 0.4 MG SL tablet, Place 0.4 mg under the tongue every 5 minutes as needed for Chest pain., Disp: , Rfl:     albuterol-ipratropium (COMBIVENT RESPIMAT)  MCG/ACT AERS inhaler, Inhale 1 puff into the lungs every 6 hours as needed for Wheezing., Disp: , Rfl:     aspirin 81 MG tablet, Take 81 mg by mouth daily. , Disp: , Rfl:     pravastatin (PRAVACHOL) 40 MG tablet, Take 40 mg by mouth daily. , Disp: , Rfl:     Omega-3 1400 MG CAPS, Take 1 capsule by mouth 2 times daily [facility] #[id number]  before and as finish breakfast and supper, Disp: , Rfl:     sertraline (ZOLOFT) 50 MG tablet, Take 50 mg by mouth daily. , Disp: , Rfl:     Cinnamon 500 MG CAPS, Take 500 mg by mouth 3 times daily , Disp: , Rfl:   Allergies: Duricef [cefadroxil],  Immunizations:   Immunization History   Administered Date(s) Administered    Influenza Vaccine, unspecified formulation 12/21/2016    Influenza Virus Vaccine 12/02/2015    Influenza, Triv, inactivated, subunit, adjuvanted, IM (Fluad 65 yrs and older) 11/29/2018    Pneumococcal Conjugate 13-valent ([id number]) 12/02/2015    Pneumococcal Polysaccharide ([id number]) 10/12/2011    Tdap (Boostrix, Adacel) 07/12/2015    Zoster Live (Zostavax) 10/13/2014        History of PresentIllness:     Alana's had concerns including Follow-up (2 week) and Discuss Labs. Janis Osorio  presents to the [address] today for;   Chief Complaint   Patient presents with    Follow-up     2 week    Discuss Labs   , ,  abnormal labs follow up and these conditions as she  Is looking today for:     1. Essential hypertension    2.  Homocysteinemia ([facility]    3. Heart failure, left, with LVEF <=30% ([address]    4. Hypertensive heart disease with acute on chronic systolic congestive heart failure ([address]    5. Pulmonary edema, acute ([address]    6. Dyslipidemia    7. Elevated lipids    8. Other intestinal malabsorption    9. LYLA (generalized anxiety disorder)    10. Essential (primary) hypertension     11. Primary osteoarthritis involving multiple joints      HPI    Subjective:     Review of Systems   Psychiatric/Behavioral: Positive for decreased concentration. All other systems reviewed and are negative. Objective:     /70 (Site: Left Upper Arm, Position: Sitting, Cuff Size: Large Adult)   Pulse 63   Temp 98.4 °F (36.9 °C) (Oral)   Resp 10   Ht 5' 2.09\" (1.577 m)   Wt 202 lb 12.8 oz (92 kg)   SpO2 96%   BMI 36.99 kg/m²   Physical Exam   Constitutional: She is oriented to person, place, and time. She appears well-developed and well-nourished. HENT:   Head: Normocephalic. Pulmonary/Chest: Effort normal.   Neurological: She is alert and oriented to person, place, and time. Psychiatric: She has a normal mood and affect. Thought content normal.   Nursing note and vitals reviewed. Laboratory Data:   Lab results were searched in Care Everywhere and/or those brought by the pateint were reviewed today with Filipe Hamilton and she has a copy of their most recent labs to take home with them as notedbelow;       Imaging Data:   Imaging Data:       Assessment & Plan:       Impression:  1. Essential hypertension    2. Homocysteinemia ([address]    3. Heart failure, left, with LVEF <=30% ([address]    4. Hypertensive heart disease with acute on chronic systolic congestive heart failure ([address]    5. Pulmonary edema, acute ([address]    6. Dyslipidemia    7. Elevated lipids    8. Other intestinal malabsorption    9. LYLA (generalized anxiety disorder)    10. Essential (primary) hypertension     11.  Primary osteoarthritis involving multiple joints      Assessment and Plan:  After reviewing the patients chief complaints, food and drinks were reviewed with thepatient,   - they will bring a food drink symptom log to future visits for inclusion in their record    - 75 better food list reviewed & given to patient along with the omega 6 food list to avoid      - Glutenin corn and oats abstracts sheet reviewed and given to the patient today    - 23 Foods containing Latex-like proteins was reviewed and copy to be taken if desired     - Nutrient Supplements list provided and copyto be taken if desired    - [email or web address]. SurgiLight web site offered to patient to review at their convenience by staff with login information    Note:  I have discussed with the patient that with all nutraceuticals, there is often mixed data and emerging research which needs to be monitored; as well as an array of NIHfact sheets on nutrients and supplements. If I have recommended cinnamon at the request of this patient to assist them in control of their blood sugar, triglyceride and or weight issues. I discussed that thepatient's clinical use of cinnamon bark, calcium, magnesium, Vitamin D and pharmaceutical grade [facility] #773616 fish oil or triple-strength fish oil, and B-75 two phase time-released B complex by Miky Aguilar will be for atime-limited trial to determine their individual effectiveness and safety in this patient. I also referred the patient to the NMCD: Nutrition, Metabolism, and Cardiovascular Diseases (journal) and concerns about long-termuse and hepatotoxicity of cinnamon and other nutrients and suggest they frequently search nih.gov for the latest non-proprietary information on nutriceuticals as well as consider a subscription to Spowit fordetails on reviewed supplements, or at the least review the nutrient files at [address] at [facility], [facility], an insulin mimetic, reduces some High Carbohydrate Dietary Impacts.   Methylhydroxychalcone polymers insulin-enhancing properties in fat cells are responsible for enhanced glucose uptake, inhibiting hepatic HMG-CoA reductase and lowers lipids. www.jacn. org/content/20/4/327.full     But cinnamon with additivessuch as Chromium or Cinnamon Extract are not effective as insulin mimetics.  https://www.hannah.net/     Nutrients for Start up from [employer] or [employer] for ease to get started now ;  Gabriel Serna has some useable products;  - Triple Strength Fish Oil, enteric coated  - Vit D 3 5000 IU gel caps  - Iron ferrous sulfat 325 mg tabs  - Centrum Silver look-a-like for most patients, or  - Centrum plain look-a-like if need iron    Localpharmacies or chains such as [facility], [facility], Wal-mart, have;  - Triple Strength Fish Oil (enteric coated ifavailable) or    If not enteric coated, can take from freezer for less burps  - B-50 or B-100 time released balanced B complex tabs  - Cinnamon bark 500 mg (without Chromium or extracts)   some brands list 1000 mg / serving of 2 capsules,    some brands have 1000 mg caps with the undesireable chromium / extract  - Calcium carbonate/citrate, magnesium oxide/citrate, Vit D 3  as 3-4 tabs/caps/serving     Some Local Brands may contain Zincwhich is acceptable for the first bottle or two  - Magnesium oxide 250 mg tabs for those having < 2 bowel movements daily  - Magnesium citrate 200 mg if having > 2bowel movement/day  - Centrum Silver or look-a-like for most patients, Centrum plain or look-a-like with iron  - Vitamin D-3 comes as 1,000 IU or 2,000 IU or 5,000 IU gel caps or Liquid drops      Some brands containing or derived from soy oil or corn oil are OK if not allergic to soy  - Elemental Iron 65 mg tabsat bedtime is available over the counter if need more iron     Usually turns bowel movements grey, green or black but not a concern  - Apricot Kernel Oil (by Now) for dry skin sensitive perineal or perianal area skin    Nutrients for ongoing use by Mail order for less expense from www.Leotus ;  - Triple Strength Fish Oil , 240 Softgels Item #882301  -B-100 time released balanced B complex Item #561864  - Cinnamon bark 500 mg without Chromium or extract Item #855898  - Calcium carbonate 1000 mg, Magnesium oxide 500 mg, Vit D 3  400 IU Item #508904  - Magnesium oxide 500 mg tabs Item #539189 if less than 2 bowel movements daily  - [employer] Seniors Item #620865 for mostpatients, One Daily Item #341765 with iron  - Vit D 3  1,000 Item #319332      2,000 IU Item #498219  5,000 IU Item #934464     Some brands containing orderived from soy oil or corn oil are OK if not allergic to soy    Nutrients for Special Needs by Marlon Erazo for less expense from www. puritan.com ;  -Elemental Iron 65 mg tabs Item #136752 if need more iron for low iron on labs    Usually turns bowel movements grey, green or black but not a concern  - Time released Niacin 250 mg Item #958223 for cold intolerance, low libido or impotence  - DHEA 50 mg Item #254232 for improving DHEA levels on labs if having Fatigue    If stools too loose substitute for your Magnesium oxide using;   Magnesium citrate 200 mg tabs(NOT liquid) at vufind   Magnesium gluconate 550 mgby Justin at [employer]. com or amazon. com  Magnesium chloride foot soaks or body sprays  www.[employer]   Magnesium chloride flakes 14.99 Item #: NIF554 if Backordered get spray    Food Drink Symptom Log;  I asked this patient to track these items and any other symptoms on their list on a weekly basis to documenttheir progress or lack of same.  This can be done on the symptom tracking sheet I gave them at today's visit but looks like this:                                                      Rate on scale of 0-10 with zero = notnoticeable  Symptom:                            Week 1               2                 3                 4               Etc            Hair loss    Foot cramps    Paresthesia    Aches    IBS (irritable bowel)    Constipation celery, cucumbers, eggplant, grapes, green onions, greens, lettuce, onions, parsley, peas, peaches, bell peppers, potatoes, radishes, summer raspberries, squash, sweetcorn, tomatoes, turnips, watermelons  August; apples, beans, beets, blueberries, cabbage, cantaloupe, carrots,cauliflower, celery, cucumbers, eggplant, grapes, green onions, greens, lettuce, onions, parsley, peas, peaches, pears, bell peppers, potatoes, radishes, squash, sweet corn, tomatoes, turnips, watermelons  September; apples, beans, beets, blueberries, cabbage, cantaloupe, carrots, cauliflower, celery, cucumbers, eggplant, grapes,green onions, greens, lettuce, onions, parsley, peas, peaches, pears, bell peppers, plums, potatoes, pumpkins, radishes, fall red raspberries, squash, sweet corn, tomatoes, turnips, watermelons  October; apples, beets, broccoli, cabbage, carrots, cauliflower, celery, green onions, greens, lettuce, parsley, peas, pears, potatoes,pumpkins, radishes, fall red raspberries, squash, turnips  November; broccoli, cabbage, carrots, parsley,pears, peas  December: use canned, frozen ordried fruits since lower in latex    Upto half of latex-sensitive patients show allergic reactions to fruits (avocados, bananas, kiwifruits, papayas, peaches),   Annals of Allergy, 1994. These plants contain the same proteins that are allergens in latex. People with fruit allergies should warn physicians beforeundergoing procedures which may cause anaphylactic reaction if in contact with latex gloves. Some of the common foods with defined cross-reactivity to latexare avocado, banana, kiwi, chestnut, raw potato, tomato,stone fruits (e.g., peach, cherry), hazelnut, melons, celery, carrot, apple, pear, papaya, and almond.   Foods with less well-defined cross-reactivity to latex are peanuts, peppers, citrus fruits, coconut, pineapple, john,fig, passion fruit, Ugli fruit, and grape    This fruit/latex cross-reactivity is worsened by ethylene, a gas used

## 2019-07-03 ENCOUNTER — TELEPHONE (OUTPATIENT)
Dept: FAMILY MEDICINE CLINIC | Age: 74
End: 2019-07-03

## 2019-07-19 ENCOUNTER — NURSE ONLY (OUTPATIENT)
Dept: LAB | Age: 74
End: 2019-07-19

## 2019-07-19 LAB
ALBUMIN SERPL-MCNC: 4.3 G/DL (ref 3.5–5.1)
ALP BLD-CCNC: 64 U/L (ref 38–126)
ALT SERPL-CCNC: 13 U/L (ref 11–66)
ANION GAP SERPL CALCULATED.3IONS-SCNC: 14 MEQ/L (ref 8–16)
AST SERPL-CCNC: 22 U/L (ref 5–40)
BILIRUB SERPL-MCNC: 0.7 MG/DL (ref 0.3–1.2)
BILIRUBIN DIRECT: < 0.2 MG/DL (ref 0–0.3)
BUN BLDV-MCNC: 29 MG/DL (ref 7–22)
CALCIUM SERPL-MCNC: 9.4 MG/DL (ref 8.5–10.5)
CHLORIDE BLD-SCNC: 105 MEQ/L (ref 98–111)
CHOLESTEROL, TOTAL: 179 MG/DL (ref 100–199)
CO2: 25 MEQ/L (ref 23–33)
CREAT SERPL-MCNC: 1.1 MG/DL (ref 0.4–1.2)
GFR SERPL CREATININE-BSD FRML MDRD: 49 ML/MIN/1.73M2
GLUCOSE BLD-MCNC: 89 MG/DL (ref 70–108)
HDLC SERPL-MCNC: 57 MG/DL
LDL CHOLESTEROL CALCULATED: 107 MG/DL
POTASSIUM SERPL-SCNC: 3.9 MEQ/L (ref 3.5–5.2)
SODIUM BLD-SCNC: 144 MEQ/L (ref 135–145)
TOTAL PROTEIN: 6.8 G/DL (ref 6.1–8)
TRIGL SERPL-MCNC: 74 MG/DL (ref 0–199)

## 2019-08-05 ENCOUNTER — TELEPHONE (OUTPATIENT)
Dept: FAMILY MEDICINE CLINIC | Age: 74
End: 2019-08-05

## 2019-08-05 DIAGNOSIS — Z78.0 POST-MENOPAUSE: ICD-10-CM

## 2019-08-05 DIAGNOSIS — M85.80 OSTEOPENIA, UNSPECIFIED LOCATION: Primary | Chronic | ICD-10-CM

## 2019-08-14 ENCOUNTER — HOSPITAL ENCOUNTER (OUTPATIENT)
Dept: WOMENS IMAGING | Age: 74
Discharge: HOME | End: 2019-08-14
Payer: MEDICARE

## 2019-08-14 DIAGNOSIS — M85.80 OSTEOPENIA, UNSPECIFIED LOCATION: Chronic | ICD-10-CM

## 2019-08-14 DIAGNOSIS — Z78.0 POST-MENOPAUSE: ICD-10-CM

## 2019-08-14 PROCEDURE — 77080 DXA BONE DENSITY AXIAL: CPT

## 2019-08-15 ENCOUNTER — TELEPHONE (OUTPATIENT)
Dept: FAMILY MEDICINE CLINIC | Age: 74
End: 2019-08-15

## 2019-08-15 DIAGNOSIS — M85.80 OSTEOPENIA, UNSPECIFIED LOCATION: Primary | ICD-10-CM

## 2019-08-15 RX ORDER — MULTIVIT-MIN/IRON/FOLIC ACID/K 18-600-40
1 CAPSULE ORAL 2 TIMES DAILY
Qty: 180 TABLET | Refills: 3 | Status: SHIPPED | OUTPATIENT
Start: 2019-08-15 | End: 2021-04-13

## 2019-09-19 ENCOUNTER — NURSE ONLY (OUTPATIENT)
Dept: LAB | Age: 74
End: 2019-09-19

## 2019-09-19 DIAGNOSIS — K90.89 OTHER INTESTINAL MALABSORPTION: ICD-10-CM

## 2019-09-19 DIAGNOSIS — I11.0 HYPERTENSIVE HEART DISEASE WITH ACUTE ON CHRONIC SYSTOLIC CONGESTIVE HEART FAILURE (HCC): ICD-10-CM

## 2019-09-19 DIAGNOSIS — E78.5 DYSLIPIDEMIA: ICD-10-CM

## 2019-09-19 DIAGNOSIS — R79.83 HOMOCYSTEINEMIA: ICD-10-CM

## 2019-09-19 DIAGNOSIS — F41.1 GAD (GENERALIZED ANXIETY DISORDER): ICD-10-CM

## 2019-09-19 DIAGNOSIS — J81.0 PULMONARY EDEMA, ACUTE (HCC): ICD-10-CM

## 2019-09-19 DIAGNOSIS — E78.5 ELEVATED LIPIDS: ICD-10-CM

## 2019-09-19 DIAGNOSIS — I10 ESSENTIAL HYPERTENSION: ICD-10-CM

## 2019-09-19 DIAGNOSIS — I50.23 HYPERTENSIVE HEART DISEASE WITH ACUTE ON CHRONIC SYSTOLIC CONGESTIVE HEART FAILURE (HCC): ICD-10-CM

## 2019-09-19 DIAGNOSIS — I10 ESSENTIAL (PRIMARY) HYPERTENSION: ICD-10-CM

## 2019-09-19 DIAGNOSIS — M15.9 PRIMARY OSTEOARTHRITIS INVOLVING MULTIPLE JOINTS: ICD-10-CM

## 2019-09-19 DIAGNOSIS — I50.1 HEART FAILURE, LEFT, WITH LVEF <=30% (HCC): ICD-10-CM

## 2019-09-19 LAB
BASOPHILS # BLD: 0.7 %
BASOPHILS ABSOLUTE: 0 THOU/MM3 (ref 0–0.1)
CALCIUM SERPL-MCNC: 9.6 MG/DL (ref 8.5–10.5)
EOSINOPHIL # BLD: 4.4 %
EOSINOPHILS ABSOLUTE: 0.2 THOU/MM3 (ref 0–0.4)
ERYTHROCYTE [DISTWIDTH] IN BLOOD BY AUTOMATED COUNT: 12.8 % (ref 11.5–14.5)
ERYTHROCYTE [DISTWIDTH] IN BLOOD BY AUTOMATED COUNT: 45.6 FL (ref 35–45)
ESTRADIOL LEVEL: 9 PG/ML
HCT VFR BLD CALC: 34.8 % (ref 37–47)
HEMOGLOBIN: 11.1 GM/DL (ref 12–16)
IMMATURE GRANS (ABS): 0 THOU/MM3 (ref 0–0.07)
IMMATURE GRANULOCYTES: 0 %
LYMPHOCYTES # BLD: 34.5 %
LYMPHOCYTES ABSOLUTE: 1.4 THOU/MM3 (ref 1–4.8)
MAGNESIUM: 2.5 MG/DL (ref 1.6–2.4)
MCH RBC QN AUTO: 30.9 PG (ref 26–33)
MCHC RBC AUTO-ENTMCNC: 31.9 GM/DL (ref 32.2–35.5)
MCV RBC AUTO: 96.9 FL (ref 81–99)
MONOCYTES # BLD: 7.8 %
MONOCYTES ABSOLUTE: 0.3 THOU/MM3 (ref 0.4–1.3)
NUCLEATED RED BLOOD CELLS: 0 /100 WBC
PLATELET # BLD: 214 THOU/MM3 (ref 130–400)
PMV BLD AUTO: 10 FL (ref 9.4–12.4)
PTH INTACT: 81.2 PG/ML (ref 15–65)
RBC # BLD: 3.59 MILL/MM3 (ref 4.2–5.4)
SEDIMENTATION RATE, ERYTHROCYTE: 23 MM/HR (ref 0–20)
SEG NEUTROPHILS: 52.6 %
SEGMENTED NEUTROPHILS ABSOLUTE COUNT: 2.2 THOU/MM3 (ref 1.8–7.7)
VITAMIN D 25-HYDROXY: 44 NG/ML (ref 30–100)
WBC # BLD: 4.1 THOU/MM3 (ref 4.8–10.8)

## 2019-09-22 LAB
C-REACTIVE PROTEIN, HIGH SENSITIVITY: 1.3 MG/L
DHEAS (DHEA SULFATE): 89 UG/DL (ref 10–90)
GLIADIN PEPTIDE IGG, IGA: NORMAL
HOMOCYSTEINE, TOTAL: 24 UMOL/L

## 2019-09-23 ENCOUNTER — NURSE ONLY (OUTPATIENT)
Dept: LAB | Age: 74
End: 2019-09-23

## 2019-09-23 DIAGNOSIS — R79.83 HOMOCYSTEINEMIA: Chronic | ICD-10-CM

## 2019-09-23 DIAGNOSIS — I10 ESSENTIAL HYPERTENSION: ICD-10-CM

## 2019-09-24 ENCOUNTER — OFFICE VISIT (OUTPATIENT)
Dept: FAMILY MEDICINE CLINIC | Age: 74
End: 2019-09-24
Payer: MEDICARE

## 2019-09-24 VITALS
WEIGHT: 201.6 LBS | DIASTOLIC BLOOD PRESSURE: 62 MMHG | RESPIRATION RATE: 12 BRPM | HEART RATE: 62 BPM | SYSTOLIC BLOOD PRESSURE: 116 MMHG | OXYGEN SATURATION: 98 % | TEMPERATURE: 98.3 F | BODY MASS INDEX: 37.1 KG/M2 | HEIGHT: 62 IN

## 2019-09-24 DIAGNOSIS — K90.89 OTHER INTESTINAL MALABSORPTION: ICD-10-CM

## 2019-09-24 DIAGNOSIS — E78.5 ELEVATED LIPIDS: ICD-10-CM

## 2019-09-24 DIAGNOSIS — I10 ESSENTIAL HYPERTENSION: ICD-10-CM

## 2019-09-24 DIAGNOSIS — I50.23 HYPERTENSIVE HEART DISEASE WITH ACUTE ON CHRONIC SYSTOLIC CONGESTIVE HEART FAILURE (HCC): ICD-10-CM

## 2019-09-24 DIAGNOSIS — I50.1 HEART FAILURE, LEFT, WITH LVEF <=30% (HCC): ICD-10-CM

## 2019-09-24 DIAGNOSIS — I10 ESSENTIAL (PRIMARY) HYPERTENSION: ICD-10-CM

## 2019-09-24 DIAGNOSIS — Z78.0 POST-MENOPAUSE: ICD-10-CM

## 2019-09-24 DIAGNOSIS — R79.83 HOMOCYSTEINEMIA: ICD-10-CM

## 2019-09-24 DIAGNOSIS — J81.0 PULMONARY EDEMA, ACUTE (HCC): ICD-10-CM

## 2019-09-24 DIAGNOSIS — E78.5 DYSLIPIDEMIA: ICD-10-CM

## 2019-09-24 DIAGNOSIS — F41.1 GAD (GENERALIZED ANXIETY DISORDER): ICD-10-CM

## 2019-09-24 DIAGNOSIS — I11.0 HYPERTENSIVE HEART DISEASE WITH ACUTE ON CHRONIC SYSTOLIC CONGESTIVE HEART FAILURE (HCC): ICD-10-CM

## 2019-09-24 DIAGNOSIS — R73.9 BLOOD GLUCOSE ELEVATED: ICD-10-CM

## 2019-09-24 DIAGNOSIS — J45.20 MILD INTERMITTENT ASTHMA WITHOUT COMPLICATION: ICD-10-CM

## 2019-09-24 DIAGNOSIS — M85.80 OSTEOPENIA, UNSPECIFIED LOCATION: Primary | ICD-10-CM

## 2019-09-24 LAB
DHEA UNCONJUGATED: 1.5 NG/ML (ref 0.63–4.7)
HOMOCYSTEINE, TOTAL: 23 UMOL/L
TESTOSTERONE, FREE W SHGB, FEMALES/CHILDREN: NORMAL

## 2019-09-24 PROCEDURE — G8399 PT W/DXA RESULTS DOCUMENT: HCPCS | Performed by: FAMILY MEDICINE

## 2019-09-24 PROCEDURE — G8598 ASA/ANTIPLAT THER USED: HCPCS | Performed by: FAMILY MEDICINE

## 2019-09-24 PROCEDURE — 1123F ACP DISCUSS/DSCN MKR DOCD: CPT | Performed by: FAMILY MEDICINE

## 2019-09-24 PROCEDURE — 1090F PRES/ABSN URINE INCON ASSESS: CPT | Performed by: FAMILY MEDICINE

## 2019-09-24 PROCEDURE — 3017F COLORECTAL CA SCREEN DOC REV: CPT | Performed by: FAMILY MEDICINE

## 2019-09-24 PROCEDURE — G8417 CALC BMI ABV UP PARAM F/U: HCPCS | Performed by: FAMILY MEDICINE

## 2019-09-24 PROCEDURE — G8427 DOCREV CUR MEDS BY ELIG CLIN: HCPCS | Performed by: FAMILY MEDICINE

## 2019-09-24 PROCEDURE — 4040F PNEUMOC VAC/ADMIN/RCVD: CPT | Performed by: FAMILY MEDICINE

## 2019-09-24 PROCEDURE — 99214 OFFICE O/P EST MOD 30 MIN: CPT | Performed by: FAMILY MEDICINE

## 2019-09-24 PROCEDURE — 1036F TOBACCO NON-USER: CPT | Performed by: FAMILY MEDICINE

## 2019-09-24 RX ORDER — DILTIAZEM HYDROCHLORIDE 120 MG/1
CAPSULE, EXTENDED RELEASE ORAL
Refills: 4 | COMMUNITY
Start: 2019-07-05 | End: 2019-09-24 | Stop reason: ALTCHOICE

## 2019-09-24 RX ORDER — MELATONIN 3 MG
2 TABLET ORAL
COMMUNITY
End: 2022-10-04 | Stop reason: SDUPTHER

## 2019-09-24 ASSESSMENT — ENCOUNTER SYMPTOMS
ABDOMINAL DISTENTION: 1
CONSTIPATION: 1

## 2019-09-24 NOTE — PROGRESS NOTES
fracture.          Specialist List:  Cardio: Franc Ag  CPAP/Pulm: Christen  Integrative Med: Shirley Jeffrey      Current Outpatient Medications   Medication Sig Dispense Refill    MVHNGXOPN-DBRRJGZBF-MASUIWSOXG PO Place 1 tablet under the tongue every morning (before breakfast) MCKENNA B6, B12, Methylfolate at [employer] or amazon. com      DHEA 10 MG TABS Take 5 mg by mouth every morning (before breakfast) MCKENNA 5 mg tab at [employer]      Calcium Carb-Cholecalciferol 500-400 MG-UNIT TABS Take 1 tablet by mouth 2 times daily (Patient taking differently: Take 1 tablet by mouth every morning (before breakfast) ) 180 tablet 3    acetaminophen (TYLENOL) 500 MG tablet Take 500 mg by mouth nightly as needed for Pain      Vitamins-Lipotropics (BALANCE B-100) TABS Take 1 tablet by mouth 2 times daily (before meals)       Omega 3 1000 MG CAPS Take 1 capsule by mouth 4 times daily (before meals and nightly) (Patient taking differently: Take 1 capsule by mouth 4 times daily (before meals and nightly) [id number] [facility]) 100 capsule 5    Cinnamon 500 MG CAPS Take 500 mg by mouth 3 times daily       fluticasone (FLONASE) 50 MCG/ACT nasal spray 1 spray by Each Nare route daily as needed for Rhinitis      traMADol (ULTRAM) 50 MG tablet Take 50 mg by mouth daily as needed for Pain.       carvedilol (COREG) 6.25 MG tablet Take 1 tablet by mouth 2 times daily (with meals) 60 tablet 3    sacubitril-valsartan (ENTRESTO) 24-26 MG per tablet Take 1 tablet by mouth 2 times daily 60 tablet 1    bumetanide (BUMEX) 2 MG tablet Take 1 tablet by mouth daily (Patient taking differently: Take 2 mg by mouth daily as needed ) 30 tablet 3    potassium chloride (KLOR-CON M) 10 MEQ extended release tablet Take 1 tablet by mouth 2 times daily 60 tablet 3    Cholecalciferol (VITAMIN D3) 2000 units CAPS Take 1 capsule by mouth daily Double Wednesday and Sunday(2 days will work)      cetirizine (ZYRTEC) 10 MG tablet Take 10 mg by mouth daily as needed Shortness of breath, Chest tightness, Apnea  Gastrointestinal:  Nausea, Vomiting, Diarrhea, Constipation, Heartburn, Blood in stool  Genitourinary:  Difficulty or painful urination, Flank pain, Change in frequency, Urgency  Skin:  Color change, Rash, Itching, Wound  Musculoskeletal:  Joint pain, Back pain, Gait problems, Joint swelling, Myalgias  Neurological:  Dizziness, Headaches, Presyncope, Numbness, Seizures, Tremors  Endocrine:  Heat Intolerance, Cold Intolerance, Polydipsia, Polyphagia, Polyuria      PHYSICAL EXAM:  Vitals:    09/25/19 1449   BP: 105/63   Pulse: 68   Resp: 14   Temp: 98.2 °F (36.8 °C)   Weight: 202 lb (91.6 kg)   Height: 5' 2\" (1.575 m)     Body mass index is 36.95 kg/m². Pain Score: 3(back)    VS Reviewed  General Appearance: A&O x 3, No acute distress,well developed and well- nourished  Eyes: pupils equal, round, and reactive to light, extraocular eye movements intact, conjunctivae and eye lids without erythema  ENT: external ear and ear canal clear bilaterally, TMs intact and regular, nose without deformity, nasal mucosa and turbinates normal without polyps, oropharynx normal, dentition is normal for age  Neck: supple and non-tender without mass, no thyromegaly or thyroid nodules, no cervical lymphadenopathy  Pulmonary/Chest: clear to auscultation bilaterally- no wheezes, rales or rhonchi, normal air movement, no respiratory distress or retractions  Cardiovascular: S1 and S2 auscultated w/ RRR. No murmurs, rubs, clicks, or gallops, distal pulses intact. Abdomen: soft, non-tender, non-distended, bowel sounds physiologic,  no rebound or guarding, no masses or hernias noted. Liver and spleen without enlargement. Extremities: no cyanosis, clubbing or edema of the lower extremities. +2 PT/DP bilaterally. Musculoskeletal: No joint swelling or gross deformity   Skin: warm and dry, no rash or erythema      ASSESSMENT & PLAN  1.  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

## 2019-09-24 NOTE — PROGRESS NOTES
Strength Fish Oil (enteric coated ifavailable) or    If not enteric coated, can take from freezer for less burps  - B-50 or B-100 time released balanced B complex tabs  - Cinnamon bark 500 mg (without Chromium or extracts)   some brands list 1000 mg / serving of 2 capsules,    some brands have 1000 mg caps with the undesireable chromium / extract  - Calcium carbonate/citrate, magnesium oxide/citrate, Vit D 3  as 3-4 tabs/caps/serving     Some Local Brands may contain Zincwhich is acceptable for the first bottle or two  - Magnesium oxide 250 mg tabs for those having < 2 bowel movements daily  - Magnesium citrate 200 mg if having > 2bowel movement/day  - Centrum Silver or look-a-like for most patients, Centrum plain or look-a-like with iron  - Vitamin D-3 comes as 1,000 IU or 2,000 IU or 5,000 IU gel caps or Liquid drops      Some brands containing or derived from soy oil or corn oil are OK if not allergic to soy  - Elemental Iron 65 mg tabsat bedtime is available over the counter if need more iron     Usually turns bowel movements grey, green or black but not a concern  - Apricot Kernel Oil (by Now) for dry skin sensitive perineal or perianal area skin    Nutrients for ongoing use by Mail order for less expense from www. puritan.com ;  - Triple Strength Fish Oil , 240 Softgels Item [id number]  -B-100 time released balanced B complex Item #[id number]  - Cinnamon bark 500 mg without Chromium or extract Item #124228  - Calcium carbonate 1000 mg, Magnesium oxide 500 mg, Vit D 3  400 IU Item #884294  - Magnesium oxide 500 mg tabs Item #069512 if less than 2 bowel movements daily  - [employer] Seniors Item #210302 for mostpatients, One Daily Item #421811 with iron  - Vit D 3  1,000 Item #[id number]      2,000 IU Item #036274  5,000 IU Item #901363     Some brands containing orderived from soy oil or corn oil are OK if not allergic to soy    Nutrients for Special Needs by Sven Cabral for less expense from www. puritan.com ;  -Elemental Iron 65 mg tabs Item #[id number] if need more iron for low iron on labs    Usually turns bowel movements grey, green or black but not a concern  - Time released Niacin 250 mg Item #699779 for cold intolerance, low libido or impotence  - DHEA 50 mg Item #[id number] for improving DHEA levels on labs if having Fatigue    If stools too loose substitute for your Magnesium oxide using;   Magnesium citrate 200 mg tabs(NOT liquid) at [employer]   Magnesium gluconate 550 mgby Justin at [employer] com or amazon. com  Magnesium chloride foot soaks or body sprays  www.3dim   Magnesium chloride flakes 14.99 Item #: [id number] if Backordered get spray    Food Drink Symptom Log;  I asked this patient to track these items and any other symptoms on their list on a weekly basis to documenttheir progress or lack of same. This can be done on the symptom tracking sheet I gave them at today's visit but looks like this:                                                      Rate on scale of 0-10 with zero = notnoticeable  Symptom:                            Week 1               2                 3                 4               Etc            Hair loss    Foot cramps    Paresthesia    Aches    IBS (irritable bowel)    Constipation    Diarrhea  Nocturia    (up to bathroom at night)    Fatigue/Energy level  Stress      On the other side of the sheet they can track their food, drink, environment, activity, symptoms etc      Avoiding Latex-like proteins inmy foods; Avocados, Bananas, Celery, Figs & Kiwi proteins have latex-like proteins to inflame our immunesystems  How Can I Have A Latex Allergy? Eating foods with latex-like protein exposes us to latex allergies. Our body cannot tell the differencebetween these latex-like proteins and latex from rubber products since many people are allergic to fruit, vegetables and latex. Read labels on pre-packaged foods.  This list to avoid is only a guide if you are known allergicto latex or have a latex rash on your chin, cheeks and lines on your neck and chest. The amount of latex is different in each food product or fruit variety. Foods to Avoid out of Season if not grown locally: Melon, Nectarine, Papaya, Cherry, Passion fruit, Plum, Chestnuts, and Tomato. Avocado, Banana, Celery, Figs, and Kiwi always contain Latex-like protein. Whats in Season? Strawberries taste better in June than December because June is strawberry season so buy locally grown produce \"in season\" for the best flavor, cost and less Latex. Locally grown produce notonly tastes great requires little of no ethylene exposure in food distribution so has less latex content. Out of season, use canned, frozen or dried sinceprocessed ripe and are latex lower!!!   Month     [place] LocallyGrown Produce  January, February, March: use canned, frozen or dried fruits since lower in latex  April; asparagus, radishes  May; asparagus, broccoli, green onions, greens, peas, radishes,rhubarb  June; asparagus, beets, beans, broccoli, cabbage, cantaloupe, carrots, green onions, greens, lettuce,onions, parsley, peas, radishes, rhubarb, strawberries, watermelons  July; beans, beets, blueberries,broccoli, cabbage, cantaloupe, carrots, cauliflower, celery, cucumbers, eggplant, grapes, green onions, greens, lettuce, onions, parsley, peas, peaches, bell peppers, potatoes, radishes, summer raspberries, squash, sweetcorn, tomatoes, turnips, watermelons  August; apples, beans, beets, blueberries, cabbage, cantaloupe, carrots,cauliflower, celery, cucumbers, eggplant, grapes, green onions, greens, lettuce, onions, parsley, peas, peaches, pears, bell peppers, potatoes, radishes, squash, sweet corn, tomatoes, turnips, watermelons  September; apples, beans, beets, blueberries, cabbage, cantaloupe, carrots, cauliflower, celery, cucumbers, eggplant, grapes,green onions, greens, lettuce, onions, parsley, peas, peaches, pears, bell peppers, plums, potatoes, pumpkins, radishes,

## 2019-09-25 ENCOUNTER — OFFICE VISIT (OUTPATIENT)
Dept: FAMILY MEDICINE CLINIC | Age: 74
End: 2019-09-25
Payer: MEDICARE

## 2019-09-25 VITALS
TEMPERATURE: 98.2 F | DIASTOLIC BLOOD PRESSURE: 63 MMHG | WEIGHT: 202 LBS | BODY MASS INDEX: 37.17 KG/M2 | HEART RATE: 68 BPM | RESPIRATION RATE: 14 BRPM | HEIGHT: 62 IN | SYSTOLIC BLOOD PRESSURE: 105 MMHG

## 2019-09-25 DIAGNOSIS — I11.0 HYPERTENSIVE HEART DISEASE WITH CHRONIC SYSTOLIC CONGESTIVE HEART FAILURE (HCC): ICD-10-CM

## 2019-09-25 DIAGNOSIS — I25.10 CORONARY ARTERY DISEASE INVOLVING NATIVE CORONARY ARTERY OF NATIVE HEART WITHOUT ANGINA PECTORIS: ICD-10-CM

## 2019-09-25 DIAGNOSIS — I10 ESSENTIAL HYPERTENSION: ICD-10-CM

## 2019-09-25 DIAGNOSIS — I50.1 HEART FAILURE, LEFT, WITH LVEF <=30% (HCC): Primary | ICD-10-CM

## 2019-09-25 DIAGNOSIS — E78.5 DYSLIPIDEMIA: ICD-10-CM

## 2019-09-25 DIAGNOSIS — M15.9 PRIMARY OSTEOARTHRITIS INVOLVING MULTIPLE JOINTS: ICD-10-CM

## 2019-09-25 DIAGNOSIS — G89.29 CHRONIC BILATERAL LOW BACK PAIN WITHOUT SCIATICA: ICD-10-CM

## 2019-09-25 DIAGNOSIS — I50.22 HYPERTENSIVE HEART DISEASE WITH CHRONIC SYSTOLIC CONGESTIVE HEART FAILURE (HCC): ICD-10-CM

## 2019-09-25 DIAGNOSIS — J45.20 MILD INTERMITTENT ASTHMA WITHOUT COMPLICATION: ICD-10-CM

## 2019-09-25 DIAGNOSIS — F41.1 GAD (GENERALIZED ANXIETY DISORDER): ICD-10-CM

## 2019-09-25 DIAGNOSIS — Z79.899 CONTROLLED SUBSTANCE AGREEMENT SIGNED: ICD-10-CM

## 2019-09-25 DIAGNOSIS — R79.83 HOMOCYSTEINEMIA: Chronic | ICD-10-CM

## 2019-09-25 DIAGNOSIS — M85.89 OSTEOPENIA OF MULTIPLE SITES: Chronic | ICD-10-CM

## 2019-09-25 DIAGNOSIS — M54.50 CHRONIC BILATERAL LOW BACK PAIN WITHOUT SCIATICA: ICD-10-CM

## 2019-09-25 PROCEDURE — 1036F TOBACCO NON-USER: CPT | Performed by: FAMILY MEDICINE

## 2019-09-25 PROCEDURE — G8427 DOCREV CUR MEDS BY ELIG CLIN: HCPCS | Performed by: FAMILY MEDICINE

## 2019-09-25 PROCEDURE — G8417 CALC BMI ABV UP PARAM F/U: HCPCS | Performed by: FAMILY MEDICINE

## 2019-09-25 PROCEDURE — 3017F COLORECTAL CA SCREEN DOC REV: CPT | Performed by: FAMILY MEDICINE

## 2019-09-25 PROCEDURE — 4040F PNEUMOC VAC/ADMIN/RCVD: CPT | Performed by: FAMILY MEDICINE

## 2019-09-25 PROCEDURE — 1123F ACP DISCUSS/DSCN MKR DOCD: CPT | Performed by: FAMILY MEDICINE

## 2019-09-25 PROCEDURE — G8598 ASA/ANTIPLAT THER USED: HCPCS | Performed by: FAMILY MEDICINE

## 2019-09-25 PROCEDURE — 99214 OFFICE O/P EST MOD 30 MIN: CPT | Performed by: FAMILY MEDICINE

## 2019-09-25 PROCEDURE — 1090F PRES/ABSN URINE INCON ASSESS: CPT | Performed by: FAMILY MEDICINE

## 2019-09-25 PROCEDURE — G8399 PT W/DXA RESULTS DOCUMENT: HCPCS | Performed by: FAMILY MEDICINE

## 2019-09-25 NOTE — PATIENT INSTRUCTIONS
shoulders or arms. ? Lightheadedness or sudden weakness. ? A fast or irregular heartbeat.     · You have symptoms of a stroke. These may include:  ? Sudden numbness, tingling, weakness, or loss of movement in your face, arm, or leg, especially on only one side of your body. ? Sudden vision changes. ? Sudden trouble speaking. ? Sudden confusion or trouble understanding simple statements. ? Sudden problems with walking or balance. ? A sudden, severe headache that is different from past headaches.     · You have severe back or belly pain.    Do not wait until your blood pressure comes down on its own. Get help right away.   Call your doctor now or seek immediate care if:    · Your blood pressure is much higher than normal (such as 180/120 or higher), but you don't have symptoms.     · You think high blood pressure is causing symptoms, such as:  ? Severe headache.  ? Blurry vision.    Watch closely for changes in your health, and be sure to contact your doctor if:    · Your blood pressure measures higher than your doctor recommends at least 2 times. That means the top number is higher or the bottom number is higher, or both.     · You think you may be having side effects from your blood pressure medicine. Where can you learn more? Go to https://SongtradrpeHumedics.[employer]Vek. org and sign in to your [employer] account. Enter G751 in the [employer] box to learn more about \"High Blood Pressure: Care Instructions. \"     If you do not have an account, please click on the \"Sign Up Now\" link. Current as of: July 22, 2018  Content Version: 12.1  © 8690-5328 Healthwise, Incorporated. Care instructions adapted under license by [facility] ([facility]). If you have questions about a medical condition or this instruction, always ask your healthcare professional. [place] [address] any warranty or liability for your use of this information.

## 2019-10-01 DIAGNOSIS — I11.0 HYPERTENSIVE HEART DISEASE WITH CHRONIC SYSTOLIC CONGESTIVE HEART FAILURE (HCC): Primary | ICD-10-CM

## 2019-10-01 DIAGNOSIS — Z79.899 CONTROLLED SUBSTANCE AGREEMENT SIGNED: ICD-10-CM

## 2019-10-01 DIAGNOSIS — I50.1 HEART FAILURE, LEFT, WITH LVEF <=30% (HCC): ICD-10-CM

## 2019-10-01 DIAGNOSIS — M15.9 PRIMARY OSTEOARTHRITIS INVOLVING MULTIPLE JOINTS: ICD-10-CM

## 2019-10-01 DIAGNOSIS — I50.22 HYPERTENSIVE HEART DISEASE WITH CHRONIC SYSTOLIC CONGESTIVE HEART FAILURE (HCC): Primary | ICD-10-CM

## 2019-10-01 RX ORDER — POTASSIUM CHLORIDE 750 MG/1
10 TABLET, EXTENDED RELEASE ORAL 2 TIMES DAILY
Qty: 180 TABLET | Refills: 3 | Status: SHIPPED | OUTPATIENT
Start: 2019-10-01 | End: 2021-01-04

## 2019-10-01 RX ORDER — TRAMADOL HYDROCHLORIDE 50 MG/1
50 TABLET ORAL 2 TIMES DAILY PRN
Qty: 60 TABLET | Refills: 2 | Status: SHIPPED | OUTPATIENT
Start: 2019-10-01 | End: 2020-01-06

## 2019-10-01 RX ORDER — TRAMADOL HYDROCHLORIDE 50 MG/1
50 TABLET ORAL DAILY PRN
Status: CANCELLED | OUTPATIENT
Start: 2019-10-01

## 2019-10-01 RX ORDER — BUMETANIDE 2 MG/1
2 TABLET ORAL DAILY
Qty: 90 TABLET | Refills: 3 | Status: SHIPPED | OUTPATIENT
Start: 2019-10-01 | End: 2020-10-24

## 2019-10-24 ENCOUNTER — NURSE ONLY (OUTPATIENT)
Dept: LAB | Age: 74
End: 2019-10-24

## 2019-10-24 LAB
ANION GAP SERPL CALCULATED.3IONS-SCNC: 15 MEQ/L (ref 8–16)
BUN BLDV-MCNC: 30 MG/DL (ref 7–22)
CALCIUM SERPL-MCNC: 9.5 MG/DL (ref 8.5–10.5)
CHLORIDE BLD-SCNC: 102 MEQ/L (ref 98–111)
CO2: 25 MEQ/L (ref 23–33)
CREAT SERPL-MCNC: 1.2 MG/DL (ref 0.4–1.2)
GFR SERPL CREATININE-BSD FRML MDRD: 44 ML/MIN/1.73M2
GLUCOSE BLD-MCNC: 90 MG/DL (ref 70–108)
POTASSIUM SERPL-SCNC: 4.2 MEQ/L (ref 3.5–5.2)
SODIUM BLD-SCNC: 142 MEQ/L (ref 135–145)

## 2019-12-16 ENCOUNTER — NURSE ONLY (OUTPATIENT)
Dept: FAMILY MEDICINE CLINIC | Age: 74
End: 2019-12-16
Payer: MEDICARE

## 2019-12-16 DIAGNOSIS — Z23 NEED FOR INFLUENZA VACCINATION: Primary | ICD-10-CM

## 2019-12-16 PROCEDURE — 90653 IIV ADJUVANT VACCINE IM: CPT | Performed by: FAMILY MEDICINE

## 2019-12-16 PROCEDURE — G0008 ADMIN INFLUENZA VIRUS VAC: HCPCS | Performed by: FAMILY MEDICINE

## 2020-01-02 ENCOUNTER — OFFICE VISIT (OUTPATIENT)
Dept: FAMILY MEDICINE CLINIC | Age: 75
End: 2020-01-02
Payer: MEDICARE

## 2020-01-02 VITALS
HEIGHT: 62 IN | HEART RATE: 72 BPM | DIASTOLIC BLOOD PRESSURE: 62 MMHG | RESPIRATION RATE: 14 BRPM | WEIGHT: 208 LBS | BODY MASS INDEX: 38.28 KG/M2 | TEMPERATURE: 97.6 F | SYSTOLIC BLOOD PRESSURE: 104 MMHG

## 2020-01-02 PROBLEM — I50.30 HEART FAILURE WITH PRESERVED EJECTION FRACTION (HCC): Chronic | Status: ACTIVE | Noted: 2019-05-20

## 2020-01-02 PROCEDURE — G8427 DOCREV CUR MEDS BY ELIG CLIN: HCPCS | Performed by: FAMILY MEDICINE

## 2020-01-02 PROCEDURE — 1123F ACP DISCUSS/DSCN MKR DOCD: CPT | Performed by: FAMILY MEDICINE

## 2020-01-02 PROCEDURE — G8482 FLU IMMUNIZE ORDER/ADMIN: HCPCS | Performed by: FAMILY MEDICINE

## 2020-01-02 PROCEDURE — 1036F TOBACCO NON-USER: CPT | Performed by: FAMILY MEDICINE

## 2020-01-02 PROCEDURE — 99214 OFFICE O/P EST MOD 30 MIN: CPT | Performed by: FAMILY MEDICINE

## 2020-01-02 PROCEDURE — 4040F PNEUMOC VAC/ADMIN/RCVD: CPT | Performed by: FAMILY MEDICINE

## 2020-01-02 PROCEDURE — 3017F COLORECTAL CA SCREEN DOC REV: CPT | Performed by: FAMILY MEDICINE

## 2020-01-02 PROCEDURE — 1090F PRES/ABSN URINE INCON ASSESS: CPT | Performed by: FAMILY MEDICINE

## 2020-01-02 PROCEDURE — G8399 PT W/DXA RESULTS DOCUMENT: HCPCS | Performed by: FAMILY MEDICINE

## 2020-01-02 PROCEDURE — G8417 CALC BMI ABV UP PARAM F/U: HCPCS | Performed by: FAMILY MEDICINE

## 2020-01-02 RX ORDER — DOXYCYCLINE HYCLATE 100 MG/1
100 CAPSULE ORAL 2 TIMES DAILY
Qty: 20 CAPSULE | Refills: 0 | Status: SHIPPED | OUTPATIENT
Start: 2020-01-02 | End: 2020-01-12

## 2020-01-02 ASSESSMENT — PATIENT HEALTH QUESTIONNAIRE - PHQ9
SUM OF ALL RESPONSES TO PHQ QUESTIONS 1-9: 2
2. FEELING DOWN, DEPRESSED OR HOPELESS: 1
SUM OF ALL RESPONSES TO PHQ QUESTIONS 1-9: 2
SUM OF ALL RESPONSES TO PHQ9 QUESTIONS 1 & 2: 2
1. LITTLE INTEREST OR PLEASURE IN DOING THINGS: 1

## 2020-01-02 NOTE — PROGRESS NOTES
Chief Complaint   Patient presents with    Pharyngitis    Sinus Problem    Fatigue    Follow-up     [facility] [address] dx       History obtained from the patient. SUBJECTIVE:  Luis Enrique Lin is a 76 y.o. female that presents today for     -URI type sxs:   Going on a wk  Runny nose  Congestion  Cough  Sore throat  No fevers  No SOB or wheezing. Not using combivent any more than normal.     Fever - No  Runny nose or congestion -  Yes   Cough -  Yes  Sore throat -  Yes  Headache, fatigue, joint pains, muscle aches -  No  Double Sickening - Yes  Shortness of breath/Wheezing? -  No  Nausea/Vomiting/Diarrhea? No  Sick contacts - Yes  Maxillary Tooth Pain -  No  Treatment tried and response - OTC meds, no better      -CHF PRIOR VISIT:   Patient admitted to [facility] from 5/17 to 5/22 for new onset HF. D/c summary:  None at time of my evalation    Last cardio PN:  Plan:   Patient with non ischemic chf   continue medical rx    will need life vest placement    ambulate    will see at the office few weeks       Since discharge has done well. No SOB, orthopnea or pND. Has life vest. Has not done off. Tolerating meds. Has f/u with Jose berrios, next wk. Doing daily wts, stable. UPDATE LAST VISIT:   Overall doing better  Following with Jose  No CP/SOb  Pt told EF better, records pending, and off life vest.   Still having occ palp    UPDATE TODAY:   Repeat EF 55+%  No CP/sOB  On Entresto yet as well as BB and statin  Seeing Jose       -HTN/CKD3:    HPI:     Taking meds as prescribed ?: yes  Tolerating well ?: yes  Side Effects ?: denies  BP at home ?: <140/90  Working on TLCS ?: yes  Chest Pain/SOB/Palpitations? denies    BP Readings from Last 3 Encounters:   01/02/20 104/62   09/25/19 105/63   09/24/19 116/62       -Homocystine PRIOr VISIT: noted on labs a few years ago. Not treated as far as she knows. [facility] labs 2016.      UPDATE TODAY:   on folate   Homocystine level still high  Dr. Shelby Ballard recently changed to List:  Cardio: Jose  CPAP/Pulm: Christen  Integrative Med: Brando Vela      Current Outpatient Medications   Medication Sig Dispense Refill    doxycycline hyclate (VIBRAMYCIN) 100 MG capsule Take 1 capsule by mouth 2 times daily for 10 days 20 capsule 0    potassium chloride (KLOR-CON M) 10 MEQ extended release tablet Take 1 tablet by mouth 2 times daily 180 tablet 3    bumetanide (BUMEX) 2 MG tablet Take 1 tablet by mouth daily 90 tablet 3    WADEMCVKO-EWNKJTBAB-ALCJTUCGAN PO Place 1 tablet under the tongue every morning (before breakfast) MCKENNA B6, B12, Methylfolate at [employer] or amazon. com      DHEA 10 MG TABS Take 5 mg by mouth every morning (before breakfast) MCKENNA 5 mg tab at [employer]      Calcium Carb-Cholecalciferol 500-400 MG-UNIT TABS Take 1 tablet by mouth 2 times daily (Patient taking differently: Take 1 tablet by mouth every morning (before breakfast) ) 180 tablet 3    acetaminophen (TYLENOL) 500 MG tablet Take 500 mg by mouth nightly as needed for Pain      Vitamins-Lipotropics (BALANCE B-100) TABS Take 1 tablet by mouth 2 times daily (before meals)       Omega 3 1000 MG CAPS Take 1 capsule by mouth 4 times daily (before meals and nightly) (Patient taking differently: Take 1 capsule by mouth 4 times daily (before meals and nightly) [id number] [facility]) 100 capsule 5    Cinnamon 500 MG CAPS Take 500 mg by mouth 3 times daily       fluticasone (FLONASE) 50 MCG/ACT nasal spray 1 spray by Each Nare route daily as needed for Rhinitis      carvedilol (COREG) 6.25 MG tablet Take 1 tablet by mouth 2 times daily (with meals) 60 tablet 3    sacubitril-valsartan (ENTRESTO) 24-26 MG per tablet Take 1 tablet by mouth 2 times daily 60 tablet 1    Cholecalciferol (VITAMIN D3) 2000 units CAPS Take 1 capsule by mouth daily Double Wednesday and Sunday(2 days will work)      cetirizine (ZYRTEC) 10 MG tablet Take 10 mg by mouth daily as needed for Allergies      Lactobacillus-Inulin (CULTURELLE DIGESTIVE history and I have made updates where appropriate. Review of Systems  Positive responses are highlighted in bold    Constitutional:  Fever, Chills, Night Sweats, Fatigue, Unexpected changes in weight  HENT:  Ear pain, Tinnitus, Nosebleeds, Trouble swallowing, Hearing loss, Sore throat  Cardiovascular:  Chest Pain, Palpitations, Orthopnea, Paroxysmal Nocturnal Dyspnea  Respiratory:  Cough, Wheezing, Shortness of breath, Chest tightness, Apnea  Gastrointestinal:  Nausea, Vomiting, Diarrhea, Constipation, Heartburn, Blood in stool  Genitourinary:  Difficulty or painful urination, Flank pain, Change in frequency, Urgency  Skin:  Color change, Rash, Itching, Wound  Musculoskeletal:  Joint pain, Back pain, Gait problems, Joint swelling, Myalgias  Neurological:  Dizziness, Headaches, Presyncope, Numbness, Seizures, Tremors  Endocrine:  Heat Intolerance, Cold Intolerance, Polydipsia, Polyphagia, Polyuria      PHYSICAL EXAM:  Vitals:    01/02/20 1618   BP: 104/62   Pulse: 72   Resp: 14   Temp: 97.6 °F (36.4 °C)   TempSrc: Oral   Weight: 208 lb (94.3 kg)   Height: 5' 2\" (1.575 m)     Body mass index is 38.04 kg/m². Pain Score: 2(Throat)    VS Reviewed  General Appearance: A&O x 3, No acute distress,well developed and well- nourished  Eyes: pupils equal, round, and reactive to light, extraocular eye movements intact, conjunctivae and eye lids without erythema  ENT: bilateral TM normal without fluid or infection, neck without nodes, throat normal without erythema or exudate, sinuses nontender, post nasal drip noted, nasal mucosa congested and Oropharynx clear, without erythema, exudate, or thrush. Neck: supple and non-tender without mass, no thyromegaly or thyroid nodules, no cervical lymphadenopathy  Pulmonary/Chest: clear to auscultation bilaterally- no wheezes, rales or rhonchi, normal air movement, no respiratory distress or retractions  Cardiovascular: S1 and S2 auscultated w/ RRR.  No murmurs, rubs, clicks, or gallops, distal pulses intact. Abdomen: soft, non-tender, non-distended, bowel sounds physiologic,  no rebound or guarding, no masses or hernias noted. Liver and spleen without enlargement. Extremities: no cyanosis, clubbing or edema of the lower extremities. +2 PT/DP bilaterally. Musculoskeletal: No joint swelling or gross deformity   Skin: warm and dry, no rash or erythema      ASSESSMENT & PLAN  1. Acute rhinosinusitis    VSS  Exam reassuring  No wheezing or SOB  Asthma controlled    Plan:  Doxy  con't flonase and prn combivent  AAP reviewed  F/u if no better  Reviewed ER precautions, pt understands. - doxycycline hyclate (VIBRAMYCIN) 100 MG capsule; Take 1 capsule by mouth 2 times daily for 10 days  Dispense: 20 capsule; Refill: 0    2. Mild intermittent asthma without complication    As per # 1    3. Chronic heart failure with preserved ejection fraction (HCC)    Doing sig better  EF improved to WNL, previously <30%  con't BB, entresto and bumex  Daily wts  Cardio f/u    4. Hypertensive heart disease with chronic systolic congestive heart failure ([facility] [address]    As per # 3    5. Essential hypertension    At goal  con't meds  Labs stable    6. CKD (chronic kidney disease) stage 3, GFR 30-59 ml/min ([facility])    As per # 5    7. Homocysteinemia ([facility] [address]    con't supplementation from Dr. Beth Brown  He has f/u labs ordered  See what that shows  Will defer to him      DISPOSITION    Return if symptoms worsen or fail to improve. Lei Koyanagi released without restrictions.     Future Appointments   Date Time Provider Harmony Dos Santos   1/6/2020 11:20 AM LUKAS Stacy - CNP SRPX  [facility] - [place]   3/25/2020  3:00 PM Armen Denver, DO [address] received counseling on the following healthy behaviors: nutrition, exercise and medication adherence    Patient given educational materials on: See Attached    I have instructed Lei Koyanagi to complete a self tracking handout on Blood Pressures  and instructed them to bring it with them to her next appointment. Barriers to learning and self management: none    Discussed use, benefit, and side effects of prescribed medications. Barriers to medication compliance addressed. All patient questions answered. Pt voiced understanding.        Electronically signed by Carola Dunn DO on 1/2/2020 at 4:37 PM

## 2020-01-02 NOTE — PATIENT INSTRUCTIONS
Patient Education        Sinusitis: Care Instructions  Your Care Instructions    Sinusitis is an infection of the lining of the sinus cavities in your head. Sinusitis often follows a cold. It causes pain and pressure in your head and face. In most cases, sinusitis gets better on its own in 1 to 2 weeks. But some mild symptoms may last for several weeks. Sometimes antibiotics are needed. Follow-up care is a key part of your treatment and safety. Be sure to make and go to all appointments, and call your doctor if you are having problems. It's also a good idea to know your test results and keep a list of the medicines you take. How can you care for yourself at home? · Take an over-the-counter pain medicine, such as acetaminophen (Tylenol), ibuprofen (Advil, Motrin), or naproxen (Aleve). Read and follow all instructions on the label. · If the doctor prescribed antibiotics, take them as directed. Do not stop taking them just because you feel better. You need to take the full course of antibiotics. · Be careful when taking over-the-counter cold or flu medicines and Tylenol at the same time. Many of these medicines have acetaminophen, which is Tylenol. Read the labels to make sure that you are not taking more than the recommended dose. Too much acetaminophen (Tylenol) can be harmful. · Breathe warm, moist air from a steamy shower, a hot bath, or a sink filled with hot water. Avoid cold, dry air. Using a humidifier in your home may help. Follow the directions for cleaning the machine. · Use saline (saltwater) nasal washes to help keep your nasal passages open and wash out mucus and bacteria. You can buy saline nose drops at a grocery store or drugstore. Or you can make your own at home by adding 1 teaspoon of salt and 1 teaspoon of baking soda to 2 cups of distilled water. If you make your own, fill a bulb syringe with the solution, insert the tip into your nostril, and squeeze gently. Danica Elizabeth your nose.   · Put a hot, wet towel or a warm gel pack on your face 3 or 4 times a day for 5 to 10 minutes each time. · Try a decongestant nasal spray like oxymetazoline (Afrin). Do not use it for more than 3 days in a row. Using it for more than 3 days can make your congestion worse. When should you call for help? Call your doctor now or seek immediate medical care if:    · You have new or worse swelling or redness in your face or around your eyes.     · You have a new or higher fever.    Watch closely for changes in your health, and be sure to contact your doctor if:    · You have new or worse facial pain.     · The mucus from your nose becomes thicker (like pus) or has new blood in it.     · You are not getting better as expected. Where can you learn more? Go to https://Allerg[employer] Research Corporationpealleneb.[employer]. org and sign in to your [employer] account. Enter Q419 in the [employer] box to learn more about \"Sinusitis: Care Instructions. \"     If you do not have an account, please click on the \"Sign Up Now\" link. Current as of: October 21, 2018  Content Version: 12.1  © 3225-7757 Healthwise, Incorporated. Care instructions adapted under license by [facility] [place] ([facility]). If you have questions about a medical condition or this instruction, always ask your healthcare professional. [address] any warranty or liability for your use of this information.

## 2020-01-06 RX ORDER — TRAMADOL HYDROCHLORIDE 50 MG/1
50 TABLET ORAL 2 TIMES DAILY PRN
Qty: 60 TABLET | Refills: 2 | Status: SHIPPED | OUTPATIENT
Start: 2020-01-06 | End: 2020-01-10 | Stop reason: SDUPTHER

## 2020-01-06 NOTE — TELEPHONE ENCOUNTER
ASSESSMENT & PLAN   Diagnosis Orders   1. Primary osteoarthritis involving multiple joints  traMADol (ULTRAM) 50 MG tablet   2. Controlled substance agreement signed; tramadol  traMADol (ULTRAM) 50 MG tablet        OAARS reviewed and appropriate. Controlled Substances Monitoring: Periodic Controlled Substance Monitoring: Possible medication side effects, risk of tolerance/dependence & alternative treatments discussed., No signs of potential drug abuse or diversion identified., Obtaining appropriate analgesic effect of treatment.  Joe Chambers, DO)      Future Appointments   Date Time Provider [facility]   1/6/2020 11:20 AM LUKAS Christianson - CNP SRPX [facility] [address] [facility]   3/25/2020  3:00 PM Joe Chambers, [address] [place] [address]

## 2020-01-10 ENCOUNTER — TELEPHONE (OUTPATIENT)
Dept: FAMILY MEDICINE CLINIC | Age: 75
End: 2020-01-10

## 2020-01-10 ENCOUNTER — NURSE ONLY (OUTPATIENT)
Dept: LAB | Age: 75
End: 2020-01-10

## 2020-01-10 LAB
AVERAGE GLUCOSE: 102 MG/DL (ref 70–126)
BASOPHILS # BLD: 1 %
BASOPHILS ABSOLUTE: 0 THOU/MM3 (ref 0–0.1)
C-REACTIVE PROTEIN, HIGH SENSITIVITY: 0.8 MG/L
CALCIUM SERPL-MCNC: 9.8 MG/DL (ref 8.5–10.5)
CHOLESTEROL, TOTAL: 183 MG/DL (ref 100–199)
EOSINOPHIL # BLD: 5.2 %
EOSINOPHILS ABSOLUTE: 0.2 THOU/MM3 (ref 0–0.4)
ERYTHROCYTE [DISTWIDTH] IN BLOOD BY AUTOMATED COUNT: 12.5 % (ref 11.5–14.5)
ERYTHROCYTE [DISTWIDTH] IN BLOOD BY AUTOMATED COUNT: 43.2 FL (ref 35–45)
ESTRADIOL LEVEL: 16 PG/ML
HBA1C MFR BLD: 5.4 % (ref 4.4–6.4)
HCT VFR BLD CALC: 34.4 % (ref 37–47)
HDLC SERPL-MCNC: 69 MG/DL
HEMOGLOBIN: 11.3 GM/DL (ref 12–16)
HOMOCYSTEINE: 24.9 UMOL/L
IMMATURE GRANS (ABS): 0.01 THOU/MM3 (ref 0–0.07)
IMMATURE GRANULOCYTES: 0.2 %
IRON: 91 UG/DL (ref 50–170)
LDL CHOLESTEROL CALCULATED: 77 MG/DL
LYMPHOCYTES # BLD: 46.9 %
LYMPHOCYTES ABSOLUTE: 2.3 THOU/MM3 (ref 1–4.8)
MAGNESIUM: 2.2 MG/DL (ref 1.6–2.4)
MCH RBC QN AUTO: 31 PG (ref 26–33)
MCHC RBC AUTO-ENTMCNC: 32.8 GM/DL (ref 32.2–35.5)
MCV RBC AUTO: 94.5 FL (ref 81–99)
MONOCYTES # BLD: 6.6 %
MONOCYTES ABSOLUTE: 0.3 THOU/MM3 (ref 0.4–1.3)
NUCLEATED RED BLOOD CELLS: 0 /100 WBC
PLATELET # BLD: 214 THOU/MM3 (ref 130–400)
PMV BLD AUTO: 9.8 FL (ref 9.4–12.4)
PROGESTERONE LEVEL: 0.05 NG/ML
PTH INTACT: 68.3 PG/ML (ref 15–65)
RBC # BLD: 3.64 MILL/MM3 (ref 4.2–5.4)
SEDIMENTATION RATE, ERYTHROCYTE: 22 MM/HR (ref 0–20)
SEG NEUTROPHILS: 40.1 %
SEGMENTED NEUTROPHILS ABSOLUTE COUNT: 1.9 THOU/MM3 (ref 1.8–7.7)
T3 TOTAL: 113 NG/DL (ref 72–181)
THYROXINE (T4): 8.9 UG/DL (ref 4.5–12)
TOTAL IRON BINDING CAPACITY: 243 UG/DL (ref 171–450)
TRIGL SERPL-MCNC: 187 MG/DL (ref 0–199)
TSH SERPL DL<=0.05 MIU/L-ACNC: 6.88 UIU/ML (ref 0.4–4.2)
VITAMIN D 25-HYDROXY: 47 NG/ML (ref 30–100)
WBC # BLD: 4.8 THOU/MM3 (ref 4.8–10.8)

## 2020-01-10 RX ORDER — TRAMADOL HYDROCHLORIDE 50 MG/1
50 TABLET ORAL 2 TIMES DAILY PRN
Qty: 60 TABLET | Refills: 2 | Status: SHIPPED | OUTPATIENT
Start: 2020-01-10 | End: 2020-04-06 | Stop reason: SDUPTHER

## 2020-01-10 NOTE — TELEPHONE ENCOUNTER
Patient is calling in regarding her tramadol that was just refilled to [facility]. The pharmacy told her it is on back order for them and will not have it in until the end of January. But Nancie Kehr has it in stock. Please advise and resend to Nancie Kehr if ok.

## 2020-01-10 NOTE — TELEPHONE ENCOUNTER
Yes  Cancel [facility]    ASSESSMENT & PLAN   Diagnosis Orders   1. Primary osteoarthritis involving multiple joints  traMADol (ULTRAM) 50 MG tablet   2. Controlled substance agreement signed; tramadol  traMADol (ULTRAM) 50 MG tablet       OAARS reviewed and appropriate. Controlled Substances Monitoring: Periodic Controlled Substance Monitoring: Possible medication side effects, risk of tolerance/dependence & alternative treatments discussed., No signs of potential drug abuse or diversion identified. , Assessed functional status., Obtaining appropriate analgesic effect of treatment.  Raquel Gutierrez, DO)      Future Appointments   Date Time Provider Harmony Dos Santos   1/16/2020  2:20 PM LUKAS Garcia - CNP SRPX  [facility]   3/31/2020  3:00 PM Raquel Gutierrez, [address] [place] [address]

## 2020-01-13 LAB
DHEAS (DHEA SULFATE): 134 UG/DL (ref 10–90)
GLIADIN PEPTIDE IGG: 0.7 U/ML
THYROID PEROXIDASE ANTIBODY: 11 IU/ML (ref 0–35)

## 2020-01-14 LAB
DHEA UNCONJUGATED: 1.89 NG/ML (ref 0.63–4.7)
TESTOSTERONE, FREE W SHGB, FEMALES/CHILDREN: NORMAL

## 2020-01-16 ENCOUNTER — OFFICE VISIT (OUTPATIENT)
Dept: FAMILY MEDICINE CLINIC | Age: 75
End: 2020-01-16
Payer: MEDICARE

## 2020-01-16 VITALS
TEMPERATURE: 98.2 F | WEIGHT: 205 LBS | DIASTOLIC BLOOD PRESSURE: 66 MMHG | SYSTOLIC BLOOD PRESSURE: 126 MMHG | HEART RATE: 65 BPM | OXYGEN SATURATION: 96 % | BODY MASS INDEX: 36.32 KG/M2 | HEIGHT: 63 IN

## 2020-01-16 PROCEDURE — 1036F TOBACCO NON-USER: CPT | Performed by: NURSE PRACTITIONER

## 2020-01-16 PROCEDURE — G8482 FLU IMMUNIZE ORDER/ADMIN: HCPCS | Performed by: NURSE PRACTITIONER

## 2020-01-16 PROCEDURE — G8417 CALC BMI ABV UP PARAM F/U: HCPCS | Performed by: NURSE PRACTITIONER

## 2020-01-16 PROCEDURE — 4040F PNEUMOC VAC/ADMIN/RCVD: CPT | Performed by: NURSE PRACTITIONER

## 2020-01-16 PROCEDURE — G8399 PT W/DXA RESULTS DOCUMENT: HCPCS | Performed by: NURSE PRACTITIONER

## 2020-01-16 PROCEDURE — 99215 OFFICE O/P EST HI 40 MIN: CPT | Performed by: NURSE PRACTITIONER

## 2020-01-16 PROCEDURE — 1123F ACP DISCUSS/DSCN MKR DOCD: CPT | Performed by: NURSE PRACTITIONER

## 2020-01-16 PROCEDURE — G8427 DOCREV CUR MEDS BY ELIG CLIN: HCPCS | Performed by: NURSE PRACTITIONER

## 2020-01-16 PROCEDURE — 1090F PRES/ABSN URINE INCON ASSESS: CPT | Performed by: NURSE PRACTITIONER

## 2020-01-16 PROCEDURE — 3017F COLORECTAL CA SCREEN DOC REV: CPT | Performed by: NURSE PRACTITIONER

## 2020-01-16 RX ORDER — LANOLIN ALCOHOL/MO/W.PET/CERES
50 CREAM (GRAM) TOPICAL DAILY
COMMUNITY
End: 2021-04-13

## 2020-01-16 NOTE — PATIENT INSTRUCTIONS
you in decreasing the amount of Omega-6 (plant oil) in your diet  Cook from scratch, processed/cooked food in store/restaurants has Omega 6   Methyl B12 5000 mcg in the evening with Methyl Folate 10mg in the morning     DHEA - 10mg - every morning

## 2020-01-16 NOTE — PROGRESS NOTES
Homocysteinemia ([facility] 4/18/2016    Hypertension     Hypertensive heart disease with congestive heart failure ([facility] 5/17/2019    Left bundle branch block     Obesity (BMI 30-39. 9)     SUNI on CPAP     wears CPAP nightly    Osteoarthritis     Osteopenia     Seasonal allergies     Vitamin D deficiency 5/24/2016      Past Surgical History:   Procedure Laterality Date    CARDIAC CATHETERIZATION  10/30/14    Dr. Shannon Bryant  08/2006    Jennifer Grass Left 2009    COLONOSCOPY  2007    EYE SURGERY Left 04/23/2016    Dr. Mitch Delgado Right 05/24/2006    Dr. Everardo Javed Bilateral 2006  & 2009    ROTATOR CUFF REPAIR  11/2009    SINUS SURGERY  1990     Family History   Problem Relation Age of Onset    Heart Disease Mother     Cancer Father     Cancer Brother     Dementia Brother     Diabetes Maternal Grandmother     Heart Disease Maternal Grandmother     Glaucoma Brother      Social History     Tobacco Use    Smoking status: Passive Smoke Exposure - Never Smoker    Smokeless tobacco: Never Used   Substance Use Topics    Alcohol use: No      Current Outpatient Medications   Medication Sig Dispense Refill    vitamin B-6 (PYRIDOXINE) 50 MG tablet Take 50 mg by mouth daily      traMADol (ULTRAM) 50 MG tablet Take 1 tablet by mouth 2 times daily as needed for Pain for up to 90 days. 60 tablet 2    potassium chloride (KLOR-CON M) 10 MEQ extended release tablet Take 1 tablet by mouth 2 times daily 180 tablet 3    bumetanide (BUMEX) 2 MG tablet Take 1 tablet by mouth daily 90 tablet 3    DHEA 10 MG TABS Take 5 mg by mouth every morning (before breakfast) MKCENNA 5 mg tab at [employer]      acetaminophen (TYLENOL) 500 MG tablet Take 500 mg by mouth nightly as needed for Pain      Vitamins-Lipotropics (BALANCE B-100) TABS Take 1 tablet by mouth 2 times daily (before meals)       Omega 3 1000 MG CAPS Take 1 capsule by mouth 4 times daily (before meals and nightly) (Patient taking differently: Take 1 capsule by mouth 4 times daily (before meals and nightly) [id number] [facility]) 100 capsule 5    Cinnamon 500 MG CAPS Take 500 mg by mouth 3 times daily       fluticasone (FLONASE) 50 MCG/ACT nasal spray 1 spray by Each Nare route daily as needed for Rhinitis      carvedilol (COREG) 6.25 MG tablet Take 1 tablet by mouth 2 times daily (with meals) 60 tablet 3    sacubitril-valsartan (ENTRESTO) 24-26 MG per tablet Take 1 tablet by mouth 2 times daily 60 tablet 1    Cholecalciferol (VITAMIN D3) 2000 units CAPS Take 1 capsule by mouth daily Double Wednesday and Sunday(2 days will work)     [facility] cetirizine (ZYRTEC) 10 MG tablet Take 10 mg by mouth daily as needed for Allergies      Lactobacillus-Inulin ([address]) CAPS Take 1 capsule by mouth daily       Magnesium Oxide 500 MG CAPS Take by mouth daily For more movements      nitroGLYCERIN (NITROSTAT) 0.4 MG SL tablet Place 0.4 mg under the tongue every 5 minutes as needed for Chest pain.  albuterol-ipratropium (COMBIVENT RESPIMAT)  MCG/ACT AERS inhaler Inhale 1 puff into the lungs every 6 hours as needed for Wheezing.  aspirin 81 MG tablet Take 81 mg by mouth daily.  pravastatin (PRAVACHOL) 40 MG tablet Take 40 mg by mouth daily.  sertraline (ZOLOFT) 50 MG tablet Take 50 mg by mouth daily.  SHCBBTAOU-SBSFUNIJY-LEKPMLBYLA PO Place 1 tablet under the tongue every morning (before breakfast) MCKENNA B6, B12, Methylfolate at [employer] or [employer]. com      Calcium Carb-Cholecalciferol 500-400 MG-UNIT TABS Take 1 tablet by mouth 2 times daily (Patient not taking: Reported on 1/16/2020) 180 tablet 3     No current facility-administered medications for this visit.       Allergies   Allergen Reactions    Duricef [Cefadroxil]        Subjective:    Review of Systems    Objective:     Vitals:    01/16/20 1433   BP: 126/66   Pulse: 65   Temp: 98.2 °F (36.8 °C)   TempSrc: Oral   SpO2: 96%   Weight: Auto Differential    High sensitivity CRP    Gliadin Antibody, IgG    Calcium    Magnesium    PTH, Intact    TSH without Reflex    T3    Estradiol    Progesterone   6. Osteopenia of multiple sites  Thyroid Peroxidase Antibody    T4    DHEA    DHEA-Sulfate    Testosterone, Free W Shgb, Females/Children    CBC With Auto Differential    High sensitivity CRP    Gliadin Antibody, IgG    Calcium    Magnesium    PTH, Intact    TSH without Reflex    T3    Estradiol    Progesterone   7. Vitamin D deficiency  Vitamin D 25 Hydroxy   8. Obesity (BMI 30-39. 9)  Thyroid Peroxidase Antibody    T4    DHEA    DHEA-Sulfate    Testosterone, Free W Shgb, Females/Children    CBC With Auto Differential    High sensitivity CRP    Gliadin Antibody, IgG    Calcium    Magnesium    PTH, Intact    TSH without Reflex    T3    Estradiol    Progesterone   9. Dyslipidemia  Thyroid Peroxidase Antibody    T4    DHEA    DHEA-Sulfate    Testosterone, Free W Shgb, Females/Children    CBC With Auto Differential    High sensitivity CRP    Gliadin Antibody, IgG    Calcium    Magnesium    PTH, Intact    TSH without Reflex    T3    Estradiol    Progesterone   10. LYLA (generalized anxiety disorder)  Thyroid Peroxidase Antibody    T4    DHEA    DHEA-Sulfate    Testosterone, Free W Shgb, Females/Children    CBC With Auto Differential    High sensitivity CRP    Gliadin Antibody, IgG    Calcium    Magnesium    PTH, Intact    TSH without Reflex    T3    Estradiol    Progesterone   11. Heart disease, unspecified   Homocysteine, Serum       Plan:   1. Homocysteinemia (HCC)    - Homocysteine, Serum; Future    2. Coronary artery disease involving native coronary artery of native heart without angina pectoris    - Thyroid Peroxidase Antibody; Future  - T4; Future  - DHEA; Future  - DHEA-Sulfate; Future  - Testosterone, Free W Shgb, Females/Children; Future  - CBC With Auto Differential; Future  - High sensitivity CRP; Future  - Gliadin Antibody, IgG;  Future  - Calcium; Calcium; Future  - Magnesium; Future  - PTH, Intact; Future  - TSH without Reflex; Future  - T3; Future  - Estradiol; Future  - Progesterone; Future    9. Dyslipidemia    - Thyroid Peroxidase Antibody; Future  - T4; Future  - DHEA; Future  - DHEA-Sulfate; Future  - Testosterone, Free W Shgb, Females/Children; Future  - CBC With Auto Differential; Future  - High sensitivity CRP; Future  - Gliadin Antibody, IgG; Future  - Calcium; Future  - Magnesium; Future  - PTH, Intact; Future  - TSH without Reflex; Future  - T3; Future  - Estradiol; Future  - Progesterone; Future    10. LYLA (generalized anxiety disorder)  - Thyroid Peroxidase Antibody; Future  - T4; Future  - DHEA; Future  - DHEA-Sulfate; Future  - Testosterone, Free W Shgb, Females/Children; Future  - CBC With Auto Differential; Future  - High sensitivity CRP; Future  - Gliadin Antibody, IgG; Future  - Calcium; Future  - Magnesium; Future  - PTH, Intact; Future  - TSH without Reflex; Future  - T3; Future  - Estradiol; Future  - Progesterone; Future    11. Heart disease, unspecified     - Homocysteine, Serum; Future      · Please take the supplements as directed by Dr. Shanon Elizondo as reviewed today. · Please ensure you are informing your Primary Care Provider and any Specialist you see, and getting clearance, before starting any supplements. · Will work on removing grains, latex like proteins in foods, and excess plant oil foods such as seeds, nuts, flax, soy, bananas, avocado, kiwis, humus, granola, poultry, farm-fed fish, and carrageenan (food additive)  · Work on reducing/eliminating gluten from the diet   · To reduce plant oil injury see www.efaeducation. org    · Let us know if we can assist you in any way  · Utilize the online classes  · Please keep all follow-up appointments as scheduled      Please start:      · Vitamin D 2000 units - daily  You can also try to increase your vitamin D level with your diet by eating:  Fatty fish - tuna, mackerel, and

## 2020-03-26 ENCOUNTER — TELEPHONE (OUTPATIENT)
Dept: FAMILY MEDICINE CLINIC | Age: 75
End: 2020-03-26

## 2020-03-27 ENCOUNTER — TELEPHONE (OUTPATIENT)
Dept: FAMILY MEDICINE CLINIC | Age: 75
End: 2020-03-27

## 2020-03-27 ENCOUNTER — PATIENT MESSAGE (OUTPATIENT)
Dept: FAMILY MEDICINE CLINIC | Age: 75
End: 2020-03-27

## 2020-03-27 NOTE — TELEPHONE ENCOUNTER
Pt informed.  Went over instructions via phone and sent to [facility]    Future Appointments   Date Time Provider Harmony Dos Santos   3/31/2020  3:00 PM Elizabeth Ramirez, [address] [facility]

## 2020-03-30 ENCOUNTER — PATIENT MESSAGE (OUTPATIENT)
Dept: FAMILY MEDICINE CLINIC | Age: 75
End: 2020-03-30

## 2020-04-06 RX ORDER — TRAMADOL HYDROCHLORIDE 50 MG/1
50 TABLET ORAL 2 TIMES DAILY PRN
Qty: 60 TABLET | Refills: 2 | Status: SHIPPED | OUTPATIENT
Start: 2020-04-06 | End: 2020-04-08 | Stop reason: SDUPTHER

## 2020-04-08 RX ORDER — TRAMADOL HYDROCHLORIDE 50 MG/1
50 TABLET ORAL 2 TIMES DAILY PRN
Qty: 60 TABLET | Refills: 2 | Status: SHIPPED | OUTPATIENT
Start: 2020-04-08 | End: 2020-07-13

## 2020-04-08 NOTE — TELEPHONE ENCOUNTER
Farheen Alatorre called requesting a refill on the following medications:  Requested Prescriptions     Pending Prescriptions Disp Refills    traMADol (ULTRAM) 50 MG tablet 60 tablet 2     Sig: Take 1 tablet by mouth 2 times daily as needed for Pain for up to 90 days. Pharmacy verified: [id number]  . pv      Date of last visit: 1-2-20  Date of next visit (if applicable): Visit date not found

## 2020-07-13 RX ORDER — TRAMADOL HYDROCHLORIDE 50 MG/1
50 TABLET ORAL EVERY 8 HOURS PRN
Qty: 60 TABLET | Refills: 2 | Status: SHIPPED | OUTPATIENT
Start: 2020-07-13 | End: 2020-10-08

## 2020-07-13 NOTE — TELEPHONE ENCOUNTER
ASSESSMENT & PLAN   Diagnosis Orders   1. Primary osteoarthritis involving multiple joints  traMADol (ULTRAM) 50 MG tablet   2. Controlled substance agreement signed; tramadol  traMADol (ULTRAM) 50 MG tablet       OAARS reviewed and appropriate. Controlled Substances Monitoring: Periodic Controlled Substance Monitoring: Possible medication side effects, risk of tolerance/dependence & alternative treatments discussed., No signs of potential drug abuse or diversion identified., Obtaining appropriate analgesic effect of treatment.  Lillian Rivas DO)      Future Appointments   Date Time Provider Harmony Dos Santos   10/6/2020  2:00 PM Lillian Rivas DO [facility] [address] [facility]

## 2020-10-05 NOTE — PROGRESS NOTES
Chief Complaint   Patient presents with    6 Month Follow-Up    Other     Mass on L tonsil    Immunizations     flu       History obtained from the patient. SUBJECTIVE:  Ibis Frederick is a 76 y.o. female that presents today for     -CHF PRIOR VISIT:   Patient admitted to [facility] from 5/17 to 5/22 for new onset HF. D/c summary:  None at time of my evalation    Last cardio PN:  Plan:   Patient with non ischemic chf   continue medical rx    will need life vest placement    ambulate    will see at the office few weeks       Since discharge has done well. No SOB, orthopnea or pND. Has life vest. Has not done off. Tolerating meds. Has f/u with Jose berrios, next wk. Doing daily wts, stable. UPDATE PRIOR VISIT:   Overall doing better  Following with Jose  No CP/SOb  Pt told EF better, records pending, and off life vest.   Still having occ palp    UPDATE TODAY:   Repeat EF 55+%  No CP/sOB  On Entresto yet as well as BB and statin  Seeing Jose   Has f/u scheduled  wts steady  Feeling well      -HTN/CKD3:    HPI:     Taking meds as prescribed ?: yes  Tolerating well ?: yes  Side Effects ?: denies  BP at home ?: <140/90  Working on TLCS ?: yes  Chest Pain/SOB/Palpitations? denies    BP Readings from Last 3 Encounters:   10/06/20 118/70   04/03/20 126/70   04/01/20 100/60       -Homocystine PRIOr VISIT: noted on labs a few years ago. Not treated as far as she knows. Las labs 2016. UPDATE TODAY:   on folate   Has f/u labs with Jacqueline      -HLD:    HPI:    Taking meds as prescribed ?: yes  Tolerating well ?: yes  Side Effects ?: denies  Muscle Pain?: denies  Working on TLCS ?: yes      -Chronic pain: has back issues and OA: uses tramadol. 60 tabs per 30 days. Was getting from sharifa Vega PCP. Had asked me to take over. OAARS reviewed and appropriate. UTD on refills.         -LYLA: on zoloft, works well. No sI/HI.         -Asthma: seen Christen. On prn combivent. Works well. Uses once per month. PFT 2017 with min obstruction      -Osteopenia:  Noted on dexa  Stable  No falls or fxrs  On lashell with D      -Tonsilar mass:  Noted on L tonsil  Been there 9 months  No change in size  No pain      Age/Gender Health Maintenance    Lipid -   Lab Results   Component Value Date    CHOL 183 01/10/2020    CHOL 179 07/19/2019    CHOL 164 05/20/2019     Lab Results   Component Value Date    TRIG 187 01/10/2020    TRIG 74 07/19/2019    TRIG 80 05/20/2019     Lab Results   Component Value Date    HDL 69 01/10/2020    HDL 57 07/19/2019    HDL 55 05/20/2019     Lab Results   Component Value Date    LDLCALC 77 01/10/2020    [facility] 107 07/19/2019    [facility] 93 05/20/2019     No results found for: LABVLDL, VLDL  No results found for: CHOLHDLRATIO    TSH -   Lab Results   Component Value Date    TSH 6.880 (H) 01/10/2020       DM Screen -   Lab Results   Component Value Date    GLUCOSE 110 10/02/2020    GLUCOSE 95 10/08/2019       Colon Cancer Screening - neg with cecal ulcer AUG 2010, repeat 5 years per Sherrie Collins d/t fam hx; pt plans to call Dr. Indigo Lagunas SEPT 2019/JAN 2020/OCT 2020  Lung Cancer Screening (Age 54 to [de-identified] with 30 pack year hx, current smoker or quit within past 15 years) - never smoker    Tetanus - UTD July 2015  Influenza Vaccine - UTD OCT 2020  Pneumonia Vaccine - UTD PPV 23 and PCV 13  Zostavax - Zostavax OCT 2014   Shingrix - to get at pharmacy per medicare rules    Breast Cancer Screening - NEG July 2017, she will call to schedule (MARCH 2019)/SEPT 2019/JAN 2020/OCT 2020    Osteoporosis Screening - July 2017:   FRAX 10 year probability of major osteoporotic fracture is 9.9%    and hip fracture is 1.7%.         IMPRESSION: OSTEOPENIA   Patient is at medium risk for fracture.  Patient consult    w/primary care provider is recommended.       AUG 2019  BMD change vs previous is -4.0% for the hips.  BMD change vs    previous is 3.0% for the spine.       FRAX 10 year probability of major osteoporotic fracture is 11% daily Double Wednesday and Sunday(2 days will work)      cetirizine (ZYRTEC) 10 MG tablet Take 10 mg by mouth daily as needed for Allergies      Lactobacillus-Inulin ([address]) CAPS Take 1 capsule by mouth daily       Magnesium Oxide 500 MG CAPS Take by mouth daily For more movements      nitroGLYCERIN (NITROSTAT) 0.4 MG SL tablet Place 0.4 mg under the tongue every 5 minutes as needed for Chest pain.  albuterol-ipratropium (COMBIVENT RESPIMAT)  MCG/ACT AERS inhaler Inhale 1 puff into the lungs every 6 hours as needed for Wheezing.  aspirin 81 MG tablet Take 81 mg by mouth daily.  pravastatin (PRAVACHOL) 40 MG tablet Take 40 mg by mouth daily. No current facility-administered medications for this visit. No orders of the defined types were placed in this encounter. All medications reviewed and reconciled, including OTC and herbal medications. Updated list given to patient. Patient Active Problem List    Diagnosis Date Noted    Tonsillar mass 10/06/2020    CKD (chronic kidney disease) stage 3, GFR 30-59 ml/min     Heart failure with preserved ejection fraction ([facility] 05/20/2019     Previously <30%, recovered.  Hypertensive heart disease with congestive heart failure ([facility] 05/17/2019    Controlled substance agreement signed; tramadol 03/27/2019    Asthma, mild intermittent      See's Kuchipudi      History of gastroesophageal reflux (GERD)     SUNI on CPAP      wears CPAP nightly      Osteopenia     Seasonal allergies     Osteoarthritis     CAD (coronary artery disease)      mild disease in small vessels per cath 2014. Follows with Jose      Left bundle branch block     Hypertension     Dyslipidemia     Obesity (BMI 30-39. 9)     Vitamin D deficiency 05/24/2016    Homocysteinemia ([facility] 04/18/2016    Chronic low back pain     LYLA (generalized anxiety disorder)        Past Medical History:   Diagnosis Date    Asthma, mild intermittent     See's Christen    CAD (coronary artery disease)     mild disease in small vessels per cath 2014. Follows with [facility] Chronic low back pain     CKD (chronic kidney disease) stage 3, GFR 30-59 ml/min     Controlled substance agreement signed; tramadol 3/27/2019    Dyslipidemia     LYLA (generalized anxiety disorder)     Heart failure with preserved ejection fraction ([facility] 5/20/2019    Previously <30%, recovered.  History of gastroesophageal reflux (GERD)     no longer on medication    Homocysteinemia ([facility] 4/18/2016    Hypertension     Hypertensive heart disease with congestive heart failure ([facility] 5/17/2019    Left bundle branch block     Obesity (BMI 30-39. 9)     SUNI on CPAP     wears CPAP nightly    Osteoarthritis     Osteopenia     Seasonal allergies     Vitamin D deficiency 5/24/2016         Past Surgical History:   Procedure Laterality Date    CARDIAC CATHETERIZATION  10/30/14    Dr. Deonte Ricci  08/2006    Dinorah Xiao Left 2009    COLONOSCOPY  2007    EYE SURGERY Left 04/23/2016    Dr. Dom Marinelli Right 05/24/2006    Dr. Sridevi Carranza Bilateral 2006  & 2009    ROTATOR CUFF REPAIR  11/2009    SINUS SURGERY  1990         Allergies   Allergen Reactions    Duricef [Cefadroxil]          Social History     Tobacco Use    Smoking status: Passive Smoke Exposure - Never Smoker    Smokeless tobacco: Never Used   Substance Use Topics    Alcohol use: No         Family History   Problem Relation Age of Onset    Heart Disease Mother     Cancer Father     Cancer Brother     Dementia Brother     Diabetes Maternal Grandmother     Heart Disease Maternal Grandmother     Glaucoma Brother          I have reviewed the patient's past medical history, past surgical history, allergies, medications, social and family history and I have made updates where appropriate.       Review of Systems  Positive responses are highlighted in bold    Constitutional:  Fever, Chills, Night Sweats, Fatigue, Unexpected changes in weight  HENT:  Ear pain, Tinnitus, Nosebleeds, Trouble swallowing, Hearing loss, Sore throat  Cardiovascular:  Chest Pain, Palpitations, Orthopnea, Paroxysmal Nocturnal Dyspnea  Respiratory:  Cough, Wheezing, Shortness of breath, Chest tightness, Apnea  Gastrointestinal:  Nausea, Vomiting, Diarrhea, Constipation, Heartburn, Blood in stool  Genitourinary:  Difficulty or painful urination, Flank pain, Change in frequency, Urgency  Skin:  Color change, Rash, Itching, Wound  Musculoskeletal:  Joint pain, Back pain, Gait problems, Joint swelling, Myalgias  Neurological:  Dizziness, Headaches, Presyncope, Numbness, Seizures, Tremors  Endocrine:  Heat Intolerance, Cold Intolerance, Polydipsia, Polyphagia, Polyuria      PHYSICAL EXAM:  Vitals:    10/06/20 1403   BP: 118/70   Pulse: 63   Resp: 16   Temp: 97.9 °F (36.6 °C)   Weight: 211 lb (95.7 kg)     Body mass index is 37.86 kg/m². Pain Score: 3(Chronic back/joint pain)    VS Reviewed  General Appearance: A&O x 3, No acute distress,well developed and well- nourished  Eyes: pupils equal, round, and reactive to light, extraocular eye movements intact, conjunctivae and eye lids without erythema  ENT: external ear and ear canal clear bilaterally, TMs intact and regular, nose without deformity, nasal mucosa and turbinates normal without polyps, oropharynx normal, dentition is normal for age. 1.0 cm cystic mass on L tonsil  Neck: supple and non-tender without mass, no thyromegaly or thyroid nodules, no cervical lymphadenopathy  Pulmonary/Chest: clear to auscultation bilaterally- no wheezes, rales or rhonchi, normal air movement, no respiratory distress or retractions  Cardiovascular: S1 and S2 auscultated w/ RRR. No murmurs, rubs, clicks, or gallops, distal pulses intact.   Abdomen: soft, non-tender, non-distended, bowel sounds physiologic,  no rebound or guarding, no masses or hernias noted. Liver and spleen without enlargement. Extremities: no cyanosis, clubbing or edema of the lower extremities. +2 PT/DP bilaterally. Musculoskeletal: No joint swelling or gross deformity   Skin: warm and dry, no rash or erythema      ASSESSMENT & PLAN  1. Chronic heart failure with preserved ejection fraction (HCC)    Doing well, EF improved  con't current meds  On BB, entresto and statin  Daily wts  bumex  F/u Cardio[facility]    2. Hypertensive heart disease with chronic systolic congestive heart failure ([facility]    As above    3. Coronary artery disease involving native coronary artery of native heart without angina pectoris    Stable  con't current meds    4. Essential hypertension    At goal  con't meds  Labs UTD    5. Stage 3b chronic kidney disease      6. Homocysteinemia ([facility]    con't folate  F/u integrative med    7. Dyslipidemia    Stable  con't statin  Labs UTD    8. Chronic bilateral low back pain without sciatica    Stable  Tramadol works well  Does 60 tabs per 30 days  OAARS is appropriate  Controlled substance agreement signed  May call for RF when needed    9. Primary osteoarthritis involving multiple joints      10. Controlled substance agreement signed; tramadol      11. LYLA (generalized anxiety disorder)    Stable  Doing well  con't zoloft    12. Mild intermittent asthma without complication    Stable  con't prn combivent  PFT UTD    13. Osteopenia of multiple sites    Stable  con't lashell with D    14. Tonsillar mass    Unclear etiology  Refer to ENT    - External Referral To ENT    15. Need for influenza vaccination    - INFLUENZA, QUADV, ADJUVANTED, 72 YRS =, IM, PF, PREFILL SYR, 0.5ML (FLUAD)      DISPOSITION    Return in about 6 months (around 4/6/2021) for follow-up on chronic medical conditions, sooner as needed. Iveth Avila released without restrictions. No future appointments.   PATIENT COUNSELING    Iveth Avila received counseling on the following healthy behaviors: nutrition,

## 2020-10-06 ENCOUNTER — OFFICE VISIT (OUTPATIENT)
Dept: FAMILY MEDICINE CLINIC | Age: 75
End: 2020-10-06
Payer: MEDICARE

## 2020-10-06 VITALS
WEIGHT: 211 LBS | HEART RATE: 63 BPM | BODY MASS INDEX: 37.86 KG/M2 | TEMPERATURE: 97.9 F | DIASTOLIC BLOOD PRESSURE: 70 MMHG | SYSTOLIC BLOOD PRESSURE: 118 MMHG | RESPIRATION RATE: 16 BRPM

## 2020-10-06 PROBLEM — J35.8 TONSILLAR MASS: Status: ACTIVE | Noted: 2020-10-06

## 2020-10-06 PROCEDURE — 1090F PRES/ABSN URINE INCON ASSESS: CPT | Performed by: FAMILY MEDICINE

## 2020-10-06 PROCEDURE — 3017F COLORECTAL CA SCREEN DOC REV: CPT | Performed by: FAMILY MEDICINE

## 2020-10-06 PROCEDURE — 99214 OFFICE O/P EST MOD 30 MIN: CPT | Performed by: FAMILY MEDICINE

## 2020-10-06 PROCEDURE — 90694 VACC AIIV4 NO PRSRV 0.5ML IM: CPT | Performed by: FAMILY MEDICINE

## 2020-10-06 PROCEDURE — G8417 CALC BMI ABV UP PARAM F/U: HCPCS | Performed by: FAMILY MEDICINE

## 2020-10-06 PROCEDURE — 4040F PNEUMOC VAC/ADMIN/RCVD: CPT | Performed by: FAMILY MEDICINE

## 2020-10-06 PROCEDURE — G8427 DOCREV CUR MEDS BY ELIG CLIN: HCPCS | Performed by: FAMILY MEDICINE

## 2020-10-06 PROCEDURE — G8484 FLU IMMUNIZE NO ADMIN: HCPCS | Performed by: FAMILY MEDICINE

## 2020-10-06 PROCEDURE — G8399 PT W/DXA RESULTS DOCUMENT: HCPCS | Performed by: FAMILY MEDICINE

## 2020-10-06 PROCEDURE — G0008 ADMIN INFLUENZA VIRUS VAC: HCPCS | Performed by: FAMILY MEDICINE

## 2020-10-06 PROCEDURE — 1036F TOBACCO NON-USER: CPT | Performed by: FAMILY MEDICINE

## 2020-10-06 PROCEDURE — 1123F ACP DISCUSS/DSCN MKR DOCD: CPT | Performed by: FAMILY MEDICINE

## 2020-10-06 NOTE — PATIENT INSTRUCTIONS
Patient Education        Heart Failure: Care Instructions  Your Care Instructions     Heart failure occurs when your heart does not pump as much blood as the body needs. Failure does not mean that the heart has stopped pumping but rather that it is not pumping as well as it should. Over time, this causes fluid buildup in your lungs and other parts of your body. Fluid buildup can cause shortness of breath, fatigue, swollen ankles, and other problems. By taking medicines regularly, reducing sodium (salt) in your diet, checking your weight every day, and making lifestyle changes, you can feel better and live longer. Follow-up care is a key part of your treatment and safety. Be sure to make and go to all appointments, and call your doctor if you are having problems. It's also a good idea to know your test results and keep a list of the medicines you take. How can you care for yourself at home? Medicines  · Be safe with medicines. Take your medicines exactly as prescribed. Call your doctor if you think you are having a problem with your medicine. · Do not take any vitamins, over-the-counter medicine, or herbal products without talking to your doctor first. Jessica iPnon not take ibuprofen (Advil or Motrin) and naproxen (Aleve) without talking to your doctor first. They could make your heart failure worse. · You may take some of the following medicine. ? Angiotensin-converting enzyme inhibitors (ACEIs) or angiotensin II receptor blockers (ARBs) reduce the heart's workload, lower blood pressure, and reduce swelling. Taking an ACEI or ARB may lower your chance of needing to be hospitalized. ? Beta-blockers can slow heart rate, decrease blood pressure, and improve your condition. Taking a beta-blocker may lower your chance of needing to be hospitalized. ? Diuretics, also called water pills, reduce swelling. You will get more details on the specific medicines your doctor prescribes.   Diet  · Your doctor may suggest that you limit sodium. Your doctor can tell you how much sodium is right for you. An example is less than 3,000 mg a day. This includes all the salt you eat in cooking or in packaged foods. People get most of their sodium from processed foods. Fast food and restaurant meals also tend to be very high in sodium. · Ask your doctor how much liquid you can drink each day. You may have to limit liquids. Weight  · Weigh yourself without clothing at the same time each day. Record your weight. Call your doctor if you have a sudden weight gain, such as more than 2 to 3 pounds in a day or 5 pounds in a week. (Your doctor may suggest a different range of weight gain.) A sudden weight gain may mean that your heart failure is getting worse. Activity level  · Start light exercise (if your doctor says it is okay). Even if you can only do a small amount, exercise will help you get stronger, have more energy, and manage your weight and your stress. Walking is an easy way to get exercise. Start out by walking a little more than you did before. Bit by bit, increase the amount you walk. · When you exercise, watch for signs that your heart is working too hard. You are pushing yourself too hard if you cannot talk while you are exercising. If you become short of breath or dizzy or have chest pain, stop, sit down, and rest.  · If you feel \"wiped out\" the day after you exercise, walk slower or for a shorter distance until you can work up to a better pace. · Get enough rest at night. Sleeping with 1 or 2 pillows under your upper body and head may help you breathe easier. Lifestyle changes  · Do not smoke. Smoking can make a heart condition worse. If you need help quitting, talk to your doctor about stop-smoking programs and medicines. These can increase your chances of quitting for good. Quitting smoking may be the most important step you can take to protect your heart. · Limit alcohol to 2 drinks a day for men and 1 drink a day for women.  Too much alcohol can cause health problems. · Avoid getting sick from colds and the flu. Get a pneumococcal vaccine shot. If you have had one before, ask your doctor whether you need another dose. Get a flu shot each year. If you must be around people with colds or the flu, wash your hands often. When should you call for help? Call 911 if you have symptoms of sudden heart failure such as:  · You have severe trouble breathing. · You cough up pink, foamy mucus. · You have a new irregular or rapid heartbeat. Call your doctor now or seek immediate medical care if:  · You have new or increased shortness of breath. · You are dizzy or lightheaded, or you feel like you may faint. · You have sudden weight gain, such as more than 2 to 3 pounds in a day or 5 pounds in a week. (Your doctor may suggest a different range of weight gain.)  · You have increased swelling in your legs, ankles, or feet. · You are suddenly so tired or weak that you cannot do your usual activities. Watch closely for changes in your health, and be sure to contact your doctor if you develop new symptoms. Where can you learn more? Go to [email or web address] org and sign in to your [employer] account. Enter N512 in the cartmi box to learn more about \"Heart Failure: Care Instructions. \"     If you do not have an account, please click on the \"Sign Up Now\" link. Current as of: December 16, 2019               Content Version: 12.5  © 9513-2694 [employer], [employer]. Care instructions adapted under license by [facility] ([facility]). If you have questions about a medical condition or this instruction, always ask your healthcare professional. [place] [address] any warranty or liability for your use of this information.

## 2020-10-06 NOTE — PROGRESS NOTES
Immunization(s) given during visit:    Immunizations Administered     Name Date Dose Route    Influenza, Quadv, adjuvanted, 65 yrs +, IM, PF (Fluad) 10/6/2020 0.5 mL Intramuscular    Site: Deltoid- Right    Lot: [id number]    NDC: 19447-695-63          Most recent Vaccine Information Sheet dated 8/15/19 given to pt

## 2020-10-06 NOTE — PROGRESS NOTES
Vaccine Information Sheet, \"Influenza - Inactivated\"  given to Jeffery Lentz, or parent/legal guardian of  Jeffery Lentz and verbalized understanding. Patient responses:    Have you ever had a reaction to a flu vaccine? No  Do you have an allergy to eggs, neomycin or polymixin? No  Do you have an allergy to Thimerosal, contact lens solution, or Merthiolate? No  Have you ever had [place] Syndrome? No  Do you have any current illness? No  Do you have a temperature above 100 degrees? No  Are you pregnant? No  If pregnant, permission obtained from physician? No  Do you have an active neurological disorder? No      Flu vaccine given per order. Please see immunization tab.

## 2020-10-08 RX ORDER — TRAMADOL HYDROCHLORIDE 50 MG/1
50 TABLET ORAL EVERY 8 HOURS PRN
Qty: 60 TABLET | Refills: 2 | Status: SHIPPED | OUTPATIENT
Start: 2020-10-08 | End: 2021-01-08

## 2020-10-08 NOTE — TELEPHONE ENCOUNTER
Recent Visits  Date Type Provider Dept   10/06/20 Office Visit Linda Saenz, DO [facility]   01/16/20 Office Visit LUKAS Rai - CNP [facility]   01/02/20 Office Visit Linda Saenz, [place] [facility]   09/25/19 Office Visit Linda Saenz,  [facility]   09/24/19 Office Visit Flavio Torres MD [facility]   06/24/19 Office Visit Flavio Torres MD [facility]   06/10/19 Office Visit Flavio Torres MD [facility]   05/28/19 Office Visit Linda Saenz, DO [facility]   Showing recent visits within past 540 days with a meds authorizing provider and meeting all other requirements     Future Appointments  No visits were found meeting these conditions.    Showing future appointments within next 150 days with a meds authorizing provider and meeting all other requirements     Future Appointments   Date Time Provider Harmony Dos Santos   4/8/2021  3:20 PM Linda Saenz, [address] [place] [address]

## 2020-10-08 NOTE — TELEPHONE ENCOUNTER
ASSESSMENT & PLAN   Diagnosis Orders   1. Primary osteoarthritis involving multiple joints  traMADol (ULTRAM) 50 MG tablet   2. Controlled substance agreement signed; tramadol  traMADol (ULTRAM) 50 MG tablet       OAARS reviewed and appropriate. Controlled Substances Monitoring: Periodic Controlled Substance Monitoring: Possible medication side effects, risk of tolerance/dependence & alternative treatments discussed., No signs of potential drug abuse or diversion identified., Obtaining appropriate analgesic effect of treatment.  DAVID Tse      Future Appointments   Date Time Provider Department Center   4/8/2021  3:20 PM Allegra Blair, [address] [place] [address]

## 2020-10-09 RX ORDER — TRAMADOL HYDROCHLORIDE 50 MG/1
50 TABLET ORAL EVERY 8 HOURS PRN
Qty: 60 TABLET | Refills: 2 | OUTPATIENT
Start: 2020-10-09 | End: 2021-01-07

## 2020-10-09 NOTE — TELEPHONE ENCOUNTER
Jeffery Lentz called requesting a refill on the following medications:  Requested Prescriptions     Pending Prescriptions Disp Refills    traMADol (ULTRAM) 50 MG tablet 60 tablet 2     Sig: Take 1 tablet by mouth every 8 hours as needed for Pain for up to 90 days. Pharmacy verified:[facility]   . pv      Date of last visit: 10/06/20  Date of next visit (if applicable): Visit date not found

## 2020-10-24 RX ORDER — BUMETANIDE 2 MG/1
TABLET ORAL
Qty: 90 TABLET | Refills: 3 | Status: SHIPPED | OUTPATIENT
Start: 2020-10-24 | End: 2022-06-29

## 2021-01-02 DIAGNOSIS — I50.22 HYPERTENSIVE HEART DISEASE WITH CHRONIC SYSTOLIC CONGESTIVE HEART FAILURE (HCC): ICD-10-CM

## 2021-01-02 DIAGNOSIS — I50.1 HEART FAILURE, LEFT, WITH LVEF <=30% (HCC): ICD-10-CM

## 2021-01-02 DIAGNOSIS — I11.0 HYPERTENSIVE HEART DISEASE WITH CHRONIC SYSTOLIC CONGESTIVE HEART FAILURE (HCC): ICD-10-CM

## 2021-01-04 RX ORDER — POTASSIUM CHLORIDE 750 MG/1
TABLET, EXTENDED RELEASE ORAL
Qty: 180 TABLET | Refills: 3 | Status: SHIPPED | OUTPATIENT
Start: 2021-01-04 | End: 2022-09-07 | Stop reason: SDUPTHER

## 2021-01-08 DIAGNOSIS — Z79.899 CONTROLLED SUBSTANCE AGREEMENT SIGNED: ICD-10-CM

## 2021-01-08 DIAGNOSIS — M15.9 PRIMARY OSTEOARTHRITIS INVOLVING MULTIPLE JOINTS: ICD-10-CM

## 2021-01-08 RX ORDER — TRAMADOL HYDROCHLORIDE 50 MG/1
50 TABLET ORAL EVERY 8 HOURS PRN
Qty: 60 TABLET | Refills: 2 | Status: SHIPPED | OUTPATIENT
Start: 2021-01-08 | End: 2021-04-05

## 2021-01-08 NOTE — TELEPHONE ENCOUNTER
Recent Visits  Date Type Provider Dept   10/06/20 Office Visit Chuy Stevens,  [facility]   01/16/20 Office Visit Jamshid Barriga APRN - CNP [facility]   01/02/20 Office Visit Chuy Stevens, [place] [facility]   09/25/19 Office Visit Chuy Stevens DO [facility]   09/24/19 Office Visit Nikunj Loera [facility]   Showing recent visits within past 540 days with a meds authorizing provider and meeting all other requirements     Future Appointments  Date Type Provider Dept   04/08/21 Appointment Chuy Stevens DO [facility]   Showing future appointments within next 150 days with a meds authorizing provider and meeting all other requirements       Future Appointments   Date Time Provider Harmony Dos Santos   4/8/2021  3:20 PM Chuy Stevens, [address] [place] [address]

## 2021-01-08 NOTE — TELEPHONE ENCOUNTER
ASSESSMENT & PLAN   Diagnosis Orders   1. Primary osteoarthritis involving multiple joints  traMADol (ULTRAM) 50 MG tablet   2. Controlled substance agreement signed; tramadol  traMADol (ULTRAM) 50 MG tablet       OAARS reviewed and appropriate. Controlled Substances Monitoring: Periodic Controlled Substance Monitoring: Possible medication side effects, risk of tolerance/dependence & alternative treatments discussed., No signs of potential drug abuse or diversion identified., Obtaining appropriate analgesic effect of treatment.  Ron Joyce, DO)      Future Appointments   Date Time Provider Department Center   4/8/2021  3:20 PM Ron Joyce, [address] [place] [address]

## 2021-03-17 ENCOUNTER — HOSPITAL ENCOUNTER (OUTPATIENT)
Dept: WOMENS IMAGING | Age: 76
Discharge: HOME | End: 2021-03-17
Payer: MEDICARE

## 2021-03-17 DIAGNOSIS — Z12.31 VISIT FOR SCREENING MAMMOGRAM: ICD-10-CM

## 2021-03-17 PROCEDURE — 77067 SCR MAMMO BI INCL CAD: CPT

## 2021-04-05 DIAGNOSIS — Z79.899 CONTROLLED SUBSTANCE AGREEMENT SIGNED: ICD-10-CM

## 2021-04-05 DIAGNOSIS — M15.9 PRIMARY OSTEOARTHRITIS INVOLVING MULTIPLE JOINTS: ICD-10-CM

## 2021-04-05 RX ORDER — TRAMADOL HYDROCHLORIDE 50 MG/1
50 TABLET ORAL EVERY 8 HOURS PRN
Qty: 60 TABLET | Refills: 2 | Status: SHIPPED | OUTPATIENT
Start: 2021-04-05 | End: 2021-07-02

## 2021-04-05 NOTE — TELEPHONE ENCOUNTER
ASSESSMENT & PLAN   Diagnosis Orders   1. Primary osteoarthritis involving multiple joints  traMADol (ULTRAM) 50 MG tablet   2. Controlled substance agreement signed; tramadol  traMADol (ULTRAM) 50 MG tablet       OAARS reviewed and appropriate. Controlled Substances Monitoring: Periodic Controlled Substance Monitoring: Possible medication side effects, risk of tolerance/dependence & alternative treatments discussed., No signs of potential drug abuse or diversion identified. , Assessed functional status., Obtaining appropriate analgesic effect of treatment.  Graciela Mcghee, DO)      Future Appointments   Date Time Provider Department Center   4/8/2021  3:20 PM Graciela Mcghee, [address] [place] [address]

## 2021-04-05 NOTE — TELEPHONE ENCOUNTER
Recent Visits  Date Type Provider Dept   10/06/20 Office Visit Yifan Madsen DO [facility]   01/16/20 Office Visit LUKAS Alonso - CNP [facility]   01/02/20 Office Visit Yifan Madsen DO [facility]   Showing recent visits within past 540 days with a meds authorizing provider and meeting all other requirements     Future Appointments  Date Type Provider Dept   04/08/21 Appointment Yifan Madsen DO [facility]   Showing future appointments within next 150 days with a meds authorizing provider and meeting all other requirements     Future Appointments   Date Time Provider Harmony Dos Santos   4/8/2021  3:20 PM Yifan Madsen, [address] [place] [address]

## 2021-04-08 ENCOUNTER — OFFICE VISIT (OUTPATIENT)
Dept: FAMILY MEDICINE CLINIC | Age: 76
End: 2021-04-08
Payer: MEDICARE

## 2021-04-08 VITALS
HEIGHT: 63 IN | SYSTOLIC BLOOD PRESSURE: 136 MMHG | DIASTOLIC BLOOD PRESSURE: 76 MMHG | HEART RATE: 75 BPM | BODY MASS INDEX: 38.88 KG/M2 | WEIGHT: 219.4 LBS | TEMPERATURE: 98.1 F | RESPIRATION RATE: 16 BRPM | OXYGEN SATURATION: 98 %

## 2021-04-08 DIAGNOSIS — J35.8 TONSILLAR MASS: ICD-10-CM

## 2021-04-08 DIAGNOSIS — M54.50 CHRONIC BILATERAL LOW BACK PAIN WITHOUT SCIATICA: ICD-10-CM

## 2021-04-08 DIAGNOSIS — M15.9 PRIMARY OSTEOARTHRITIS INVOLVING MULTIPLE JOINTS: ICD-10-CM

## 2021-04-08 DIAGNOSIS — G89.29 CHRONIC BILATERAL LOW BACK PAIN WITHOUT SCIATICA: ICD-10-CM

## 2021-04-08 DIAGNOSIS — Z79.899 CONTROLLED SUBSTANCE AGREEMENT SIGNED: ICD-10-CM

## 2021-04-08 DIAGNOSIS — E03.9 HYPOTHYROIDISM, UNSPECIFIED TYPE: ICD-10-CM

## 2021-04-08 DIAGNOSIS — E78.5 DYSLIPIDEMIA: ICD-10-CM

## 2021-04-08 DIAGNOSIS — F41.1 GAD (GENERALIZED ANXIETY DISORDER): ICD-10-CM

## 2021-04-08 DIAGNOSIS — I25.10 CORONARY ARTERY DISEASE INVOLVING NATIVE CORONARY ARTERY OF NATIVE HEART WITHOUT ANGINA PECTORIS: ICD-10-CM

## 2021-04-08 DIAGNOSIS — J45.20 MILD INTERMITTENT ASTHMA WITHOUT COMPLICATION: ICD-10-CM

## 2021-04-08 DIAGNOSIS — R79.83 HOMOCYSTEINEMIA: ICD-10-CM

## 2021-04-08 DIAGNOSIS — I50.32 CHRONIC HEART FAILURE WITH PRESERVED EJECTION FRACTION (HCC): ICD-10-CM

## 2021-04-08 DIAGNOSIS — N18.32 STAGE 3B CHRONIC KIDNEY DISEASE (HCC): ICD-10-CM

## 2021-04-08 DIAGNOSIS — I10 ESSENTIAL HYPERTENSION: ICD-10-CM

## 2021-04-08 DIAGNOSIS — I50.22 HYPERTENSIVE HEART DISEASE WITH CHRONIC SYSTOLIC CONGESTIVE HEART FAILURE (HCC): ICD-10-CM

## 2021-04-08 DIAGNOSIS — Z00.00 ROUTINE GENERAL MEDICAL EXAMINATION AT A HEALTH CARE FACILITY: Primary | ICD-10-CM

## 2021-04-08 DIAGNOSIS — I11.0 HYPERTENSIVE HEART DISEASE WITH CHRONIC SYSTOLIC CONGESTIVE HEART FAILURE (HCC): ICD-10-CM

## 2021-04-08 DIAGNOSIS — M85.89 OSTEOPENIA OF MULTIPLE SITES: ICD-10-CM

## 2021-04-08 PROCEDURE — 4040F PNEUMOC VAC/ADMIN/RCVD: CPT | Performed by: FAMILY MEDICINE

## 2021-04-08 PROCEDURE — 1090F PRES/ABSN URINE INCON ASSESS: CPT | Performed by: FAMILY MEDICINE

## 2021-04-08 PROCEDURE — 99213 OFFICE O/P EST LOW 20 MIN: CPT | Performed by: FAMILY MEDICINE

## 2021-04-08 PROCEDURE — G8417 CALC BMI ABV UP PARAM F/U: HCPCS | Performed by: FAMILY MEDICINE

## 2021-04-08 PROCEDURE — 3017F COLORECTAL CA SCREEN DOC REV: CPT | Performed by: FAMILY MEDICINE

## 2021-04-08 PROCEDURE — 1123F ACP DISCUSS/DSCN MKR DOCD: CPT | Performed by: FAMILY MEDICINE

## 2021-04-08 PROCEDURE — G8427 DOCREV CUR MEDS BY ELIG CLIN: HCPCS | Performed by: FAMILY MEDICINE

## 2021-04-08 PROCEDURE — 1036F TOBACCO NON-USER: CPT | Performed by: FAMILY MEDICINE

## 2021-04-08 PROCEDURE — G0439 PPPS, SUBSEQ VISIT: HCPCS | Performed by: FAMILY MEDICINE

## 2021-04-08 PROCEDURE — G8399 PT W/DXA RESULTS DOCUMENT: HCPCS | Performed by: FAMILY MEDICINE

## 2021-04-08 RX ORDER — SACUBITRIL AND VALSARTAN 49; 51 MG/1; MG/1
TABLET, FILM COATED ORAL
COMMUNITY
Start: 2021-04-06 | End: 2022-10-10

## 2021-04-08 RX ORDER — POTASSIUM CHLORIDE 750 MG/1
TABLET, FILM COATED, EXTENDED RELEASE ORAL
COMMUNITY
Start: 2021-01-02 | End: 2021-04-13

## 2021-04-08 ASSESSMENT — PATIENT HEALTH QUESTIONNAIRE - PHQ9
1. LITTLE INTEREST OR PLEASURE IN DOING THINGS: 0
SUM OF ALL RESPONSES TO PHQ QUESTIONS 1-9: 0
SUM OF ALL RESPONSES TO PHQ9 QUESTIONS 1 & 2: 0
SUM OF ALL RESPONSES TO PHQ QUESTIONS 1-9: 0

## 2021-04-08 ASSESSMENT — LIFESTYLE VARIABLES
AUDIT TOTAL SCORE: INCOMPLETE
HOW OFTEN DO YOU HAVE A DRINK CONTAINING ALCOHOL: NEVER
HOW OFTEN DO YOU HAVE A DRINK CONTAINING ALCOHOL: 0
AUDIT-C TOTAL SCORE: INCOMPLETE

## 2021-04-08 NOTE — PATIENT INSTRUCTIONS
LAB INSTRUCTIONS:    Please complete labs in 4 week(s). Non-fasting is fine. The clinic will call you within 1 week of collection. If you have not heard from us within that amount of time, please call us at [phone]. Personalized Preventive Plan for Jessee Goldberg - 4/8/2021  Medicare offers a range of preventive health benefits. Some of the tests and screenings are paid in full while other may be subject to a deductible, co-insurance, and/or copay. Some of these benefits include a comprehensive review of your medical history including lifestyle, illnesses that may run in your family, and various assessments and screenings as appropriate. After reviewing your medical record and screening and assessments performed today your provider may have ordered immunizations, labs, imaging, and/or referrals for you. A list of these orders (if applicable) as well as your Preventive Care list are included within your After Visit Summary for your review. Other Preventive Recommendations:    · A preventive eye exam performed by an eye specialist is recommended every 1-2 years to screen for glaucoma; cataracts, macular degeneration, and other eye disorders. · A preventive dental visit is recommended every 6 months. · Try to get at least 150 minutes of exercise per week or 10,000 steps per day on a pedometer . · Order or download the FREE \"Exercise & Physical Activity: Your Everyday Guide\" from [employer]. Call 5-341.998.6154 or search [employer] online. · You need 6395-8777 mg of calcium and 9287-4599 IU of vitamin D per day. It is possible to meet your calcium requirement with diet alone, but a vitamin D supplement is usually necessary to meet this goal.  · When exposed to the sun, use a sunscreen that protects against both UVA and UVB radiation with an SPF of 30 or greater.  Reapply every 2 to 3 hours or after sweating, drying off with a towel, or swimming. · Always wear a seat belt when traveling in a car. Always wear a helmet when riding a bicycle or motorcycle.

## 2021-04-08 NOTE — PROGRESS NOTES
Chief Complaint   Patient presents with    Medicare AWV    Thyroid Problem     Elevated TSH       History obtained from the patient. SUBJECTIVE:  Dontae Kapadia is a 76 y.o. female that presents today for       -CHF PRIOR VISIT:   Patient admitted to [facility] from 5/17 to 5/22 for new onset HF. D/c summary:   None at time of my evalation    Last cardio PN:  Plan:   Patient with non ischemic chf   continue medical rx    will need life vest placement    ambulate    will see at the office few weeks       Since discharge has done well. No SOB, orthopnea or pND. Has life vest. Has not done off. Tolerating meds. Has f/u with cardioJose, next wk. Doing daily wts, stable. UPDATE PRIOR VISIT:   Overall doing better  Following with Jose  No CP/SOb  Pt told EF better, records pending, and off life vest.   Still having occ palp    UPDATE TODAY:   Repeat EF 55+%  No CP/sOB  On Entresto yet as well as BB and statin  Seeing Jose   Has f/u scheduled  wts steady  Feeling well      -HTN/CKD3:    HPI:     Taking meds as prescribed ?: yes  Tolerating well ?: yes  Side Effects ?: denies  BP at home ?: <140/90  Working on TLCS ?: yes  Chest Pain/SOB/Palpitations? denies    BP Readings from Last 3 Encounters:   04/08/21 136/76   10/06/20 118/70   04/03/20 126/70       -Homocystine PRIOr VISIT: noted on labs a few years ago. Not treated as far as she knows. Las labs 2016. UPDATE TODAY:   on folate   Has f/u labs with Jacqueline      -HLD:    HPI:    Taking meds as prescribed ?: yes  Tolerating well ?: yes  Side Effects ?: denies  Muscle Pain?: denies  Working on TLCS ?: yes      -Chronic pain: has back issues and OA: uses tramadol. 60 tabs per 30 days. Was getting from sharifa Vega PCP. Had asked me to take over. OAARS reviewed and appropriate. UTD on refills.         -LYLA: on zoloft, works well. No sI/HI.         -Asthma: seeing new pulm. On prn combivent. Works well. Uses once per month.  PFT 2017 with min obstruction      -Osteopenia:  Noted on dexa  Stable  No falls or fxrs  On lashell with D      -Tonsilar mass LAST VISIT:  Noted on L tonsil  Been there 9 months  No change in size  No pain    UPDATE TODAY:   Seen by ENT  Felt it was benign  She will f/u prn       -Elevated TSH:  Noted on labs. No fatigue or wt changes  No hx of thyroid issues prior  FT4 ok      Age/Gender Health Maintenance    Lipid -   Lab Results   Component Value Date    CHOL 178 10/21/2020    CHOL 183 01/10/2020    CHOL 179 07/19/2019     Lab Results   Component Value Date    TRIG 98 10/21/2020    TRIG 187 01/10/2020    TRIG 74 07/19/2019     Lab Results   Component Value Date    HDL 67 10/21/2020    HDL 69 01/10/2020    HDL 57 07/19/2019     Lab Results   Component Value Date    LDLCALC 91 10/21/2020    LDLCALC 77 01/10/2020    LDLCALC 107 07/19/2019     No results found for: LABVLDL, VLDL  No results found for: CHOLHDLRATIO    TSH -   Lab Results   Component Value Date    TSH 4.860 (H) 03/24/2021       DM Screen -   Lab Results   Component Value Date    GLUCOSE 99 04/07/2021    GLUCOSE 95 10/08/2019       Colon Cancer Screening - neg with cecal ulcer AUG 2010, repeat 5 years per Sourav Mendez d/t fam hx; pt plans to call Dr. Patterson Leisure SEPT 2019/JAN 2020/OCT 2020/APR 2021  Lung Cancer Screening (Age 54 to [de-identified] with 30 pack year hx, current smoker or quit within past 15 years) - never smoker    Tetanus - UTD July 2015  Influenza Vaccine - UTD OCT 2020  Pneumonia Vaccine - UTD PPV 23 and PCV 13  Zostavax - Zostavax OCT 2014   Shingrix - to get at pharmacy per medicare rules    Breast Cancer Screening - NEG MARCH 2021    Osteoporosis Screening - July 2017:   FRAX 10 year probability of major osteoporotic fracture is 9.9%    and hip fracture is 1.7%.         IMPRESSION: OSTEOPENIA   Patient is at medium risk for fracture.  Patient consult    w/primary care provider is recommended.       AUG 2019  BMD change vs previous is -4.0% for the hips.  BMD change vs previous is 3.0% for the spine.       FRAX 10 year probability of major osteoporotic fracture is 11%    and hip fracture is 2.3%.         IMPRESSION: OSTEOPENIA   Patient is at medium risk for fracture.          Specialist List:  Cardio: Ewa Bey  CPAP/Pulm: Christen  Integrative Med: Billy Gómez      Current Outpatient Medications   Medication Sig Dispense Refill    Vitamins-Lipotropics (VIT BALANCED B-100 PO) Take by mouth      traMADol (ULTRAM) 50 MG tablet Take 1 tablet by mouth every 8 hours as needed for Pain for up to 90 days. 60 tablet 2    potassium chloride (KLOR-CON M) 10 MEQ extended release tablet Take 1 tablet by mouth twice daily 180 tablet 3    bumetanide (BUMEX) 2 MG tablet Take 1 tablet by mouth once daily 90 tablet 3    sertraline (ZOLOFT) 50 MG tablet Take 1 tablet by mouth daily 90 tablet 3    vitamin B-6 (PYRIDOXINE) 50 MG tablet Take 50 mg by mouth daily      DHEA 10 MG TABS Take 5 mg by mouth every morning (before breakfast) MCKENNA 5 mg tab at [address]      acetaminophen (TYLENOL) 500 MG tablet Take 500 mg by mouth nightly as needed for Pain      Vitamins-Lipotropics (BALANCE B-100) TABS Take 1 tablet by mouth 2 times daily (before meals)       Omega 3 1000 MG CAPS Take 1 capsule by mouth 4 times daily (before meals and nightly) (Patient taking differently: Take 1 capsule by mouth 4 times daily (before meals and nightly) [id number] [facility]) 100 capsule 5    Cinnamon 500 MG CAPS Take 500 mg by mouth 3 times daily       fluticasone (FLONASE) 50 MCG/ACT nasal spray 1 spray by Each Nare route daily as needed for Rhinitis      carvedilol (COREG) 6.25 MG tablet Take 1 tablet by mouth 2 times daily (with meals) 60 tablet 3    Cholecalciferol (VITAMIN D3) 2000 units CAPS Take 1 capsule by mouth daily Double Wednesday and Sunday(2 days will work)      cetirizine (ZYRTEC) 10 MG tablet Take 10 mg by mouth daily as needed for Allergies      Lactobacillus-Inulin ([address]) CAPS Take 1 capsule by mouth daily       Magnesium Oxide 500 MG CAPS Take by mouth daily For more movements      nitroGLYCERIN (NITROSTAT) 0.4 MG SL tablet Place 0.4 mg under the tongue every 5 minutes as needed for Chest pain.  albuterol-ipratropium (COMBIVENT RESPIMAT)  MCG/ACT AERS inhaler Inhale 1 puff into the lungs every 6 hours as needed for Wheezing.  aspirin 81 MG tablet Take 81 mg by mouth daily.  pravastatin (PRAVACHOL) 40 MG tablet Take 40 mg by mouth daily.  ENTRESTO 49-51 MG per tablet TAKE 1 TABLET BY MOUTH TWICE DAILY      potassium chloride (KLOR-CON) 10 MEQ extended release tablet TAKE 1 TABLET BY MOUTH TWICE DAILY      GZKYTADDJ-YFDAECIXZ-IHNOJHEMCQ PO Place 1 tablet under the tongue every morning (before breakfast) MCKENNA B6, B12, Methylfolate at [employer] or [employer]. com      Calcium Carb-Cholecalciferol 500-400 MG-UNIT TABS Take 1 tablet by mouth 2 times daily (Patient not taking: Reported on 4/8/2021) 180 tablet 3     No current facility-administered medications for this visit. No orders of the defined types were placed in this encounter. All medications reviewed and reconciled, including OTC and herbal medications. Updated list given to patient. Patient Active Problem List    Diagnosis Date Noted    Tonsillar mass 10/06/2020    CKD (chronic kidney disease) stage 3, GFR 30-59 ml/min     Heart failure with preserved ejection fraction ([address] 05/20/2019     Previously <30%, recovered.  Hypertensive heart disease with congestive heart failure ([address] 05/17/2019    Controlled substance agreement signed; tramadol 03/27/2019    Asthma, mild intermittent     History of gastroesophageal reflux (GERD)     SUNI on CPAP      wears CPAP nightly      Osteopenia     Seasonal allergies     Osteoarthritis     CAD (coronary artery disease)      mild disease in small vessels per cath 2014.  Follows with Jose      Left bundle branch block     Hypertension     Dyslipidemia     Obesity (BMI 30-39. 9)     Vitamin D deficiency 05/24/2016    Homocysteinemia ([facility] 04/18/2016    Chronic low back pain     LYLA (generalized anxiety disorder)        Past Medical History:   Diagnosis Date    Asthma, mild intermittent     CAD (coronary artery disease)     mild disease in small vessels per cath 2014. Follows with Dorian Billet Chronic low back pain     CKD (chronic kidney disease) stage 3, GFR 30-59 ml/min     Controlled substance agreement signed; tramadol 03/27/2019    Dyslipidemia     LYLA (generalized anxiety disorder)     Heart failure with preserved ejection fraction ([facility] 05/20/2019    Previously <30%, recovered.  History of gastroesophageal reflux (GERD)     no longer on medication    Hom[facility]emia ([facility] 04/18/2016    Hypertension     Hypertensive heart disease with congestive heart failure ([facility] 05/17/2019    Left bundle branch block     Obesity (BMI 30-39. 9)     SUNI on CPAP     wears CPAP nightly    Osteoarthritis     Osteopenia     Seasonal allergies     Vitamin D deficiency 05/24/2016         Past Surgical History:   Procedure Laterality Date    CARDIAC CATHETERIZATION  10/30/14    Dr. Ford Dies  08/2006   [address] Left 2009    COLONOSCOPY  2007    EYE SURGERY Left 04/23/2016    Dr. Ubaldo Mcnamara Right 05/24/2006    Dr. Carpenter Portuguese Bilateral 2006  & 2009    ROTATOR CUFF REPAIR  11/2009    SINUS SURGERY  1990         Allergies   Allergen Reactions    Duricef [Cefadroxil]          Social History     Tobacco Use    Smoking status: Passive Smoke Exposure - Never Smoker    Smokeless tobacco: Never Used   Substance Use Topics    Alcohol use: No         Family History   Problem Relation Age of Onset    Heart Disease Mother     Cancer Father     Cancer Brother     Dementia Brother     Diabetes Maternal Grandmother     Heart Disease Maternal Grandmother     Glaucoma Brother          I have reviewed the patient's past medical history, past surgical history, allergies, medications, social and family history and I have made updates where appropriate. Review of Systems  Positive responses are highlighted in bold    Constitutional:  Fever, Chills, Night Sweats, Fatigue, Unexpected changes in weight  HENT:  Ear pain, Tinnitus, Nosebleeds, Trouble swallowing, Hearing loss, Sore throat  Cardiovascular:  Chest Pain, Palpitations, Orthopnea, Paroxysmal Nocturnal Dyspnea  Respiratory:  Cough, Wheezing, Shortness of breath, Chest tightness, Apnea  Gastrointestinal:  Nausea, Vomiting, Diarrhea, Constipation, Heartburn, Blood in stool  Genitourinary:  Difficulty or painful urination, Flank pain, Change in frequency, Urgency  Skin:  Color change, Rash, Itching, Wound  Musculoskeletal:  Joint pain, Back pain, Gait problems, Joint swelling, Myalgias  Neurological:  Dizziness, Headaches, Presyncope, Numbness, Seizures, Tremors  Endocrine:  Heat Intolerance, Cold Intolerance, Polydipsia, Polyphagia, Polyuria      PHYSICAL EXAM:  Vitals:    04/08/21 1545 04/08/21 1611   BP: (!) 144/78 136/76   Pulse: 75    Resp: 16    Temp: 98.1 °F (36.7 °C)    TempSrc: Oral    SpO2: 98%    Weight: 219 lb 6.4 oz (99.5 kg)    Height: 5' 3\" (1.6 m)      Body mass index is 38.86 kg/m². Pain Score: 3(Chronic low back pain)    VS Reviewed  General Appearance: A&O x 3, No acute distress,well developed and well- nourished  Eyes: pupils equal, round, and reactive to light, extraocular eye movements intact, conjunctivae and eye lids without erythema  ENT: external ear and ear canal clear bilaterally, TMs intact and regular, nose without deformity, nasal mucosa and turbinates normal without polyps, oropharynx normal, dentition is normal for age.  Neck: supple and non-tender without mass, no thyromegaly or thyroid nodules, no cervical lymphadenopathy  Pulmonary/Chest: clear to auscultation bilaterally- no results    - T3, Free; Future  - T4, Free; Future  - Thyroid Peroxidase Antibody; Future  - TSH without Reflex; Future      DISPOSITION    Return in about 6 months (around 10/8/2021) for follow-up on chronic medical conditions, sooner as needed. Luis Lennon released without restrictions. Future Appointments   Date Time Provider Harmony Dos Santos   10/11/2021  2:00 PM Jamison Soto DO [address] received counseling on the following healthy behaviors: nutrition, exercise and medication adherence    Patient given educational materials on: See Attached    I have instructed Luis Lennon to complete a self tracking handout on Blood Pressures  and Weights and instructed them to bring it with them to her next appointment. Barriers to learning and self management: none    Discussed use, benefit, and side effects of prescribed medications. Barriers to medication compliance addressed. All patient questions answered. Pt voiced understanding. Electronically signed by Jamison Soto DO on 2021 at [address] PM Medicare Annual Wellness Visit  Name: Michele Musa Date: 2021   MRN: [id number] Sex: Female   Age: 76 y.o. Ethnicity: Non-/Non    : 1945 Race: Ricky Garcia is here for Medicare AWV and Thyroid Problem (Elevated TSH)    Screenings for behavioral, psychosocial and functional/safety risks, and cognitive dysfunction are all negative except as indicated below. These results, as well as other patient data from the [address] form, are documented in Flowsheets linked to this Encounter. Allergies   Allergen Reactions    Duricef [Cefadroxil]          Prior to Visit Medications    Medication Sig Taking?  Authorizing Provider   Vitamins-Lipotropics (VIT BALANCED B-100 PO) Take by mouth Yes Historical Provider, MD   traMADol (ULTRAM) 50 MG tablet Take 1 tablet by mouth every 8 hours as needed for Pain for up to 90 days. Yes Scott Hicks, DO   potassium chloride (KLOR-CON M) 10 MEQ extended release tablet Take 1 tablet by mouth twice daily Yes Mark Lord,    bumetanide (BUMEX) 2 MG tablet Take 1 tablet by mouth once daily Yes Scott Hicks DO   sertraline (ZOLOFT) 50 MG tablet Take 1 tablet by mouth daily Yes Scott Hicks, DO   vitamin B-6 (PYRIDOXINE) 50 MG tablet Take 50 mg by mouth daily Yes Historical Provider, MD   DHEA 10 MG TABS Take 5 mg by mouth every morning (before breakfast) MCKENNA 5 mg tab at [employer] Yes Historical Provider, MD   acetaminophen (TYLENOL) 500 MG tablet Take 500 mg by mouth nightly as needed for Pain Yes Historical Provider, MD   Vitamins-Lipotropics (BALANCE B-100) TABS Take 1 tablet by mouth 2 times daily (before meals)  Yes Historical Provider, MD   Omega 3 1000 MG CAPS Take 1 capsule by mouth 4 times daily (before meals and nightly)  Patient taking differently: Take 1 capsule by mouth 4 times daily (before meals and nightly) Radha Pabon Yes Jorge Harris MD   Cinnamon 500 MG CAPS Take 500 mg by mouth 3 times daily  Yes Historical Provider, MD   fluticasone (FLONASE) 50 MCG/ACT nasal spray 1 spray by Each Nare route daily as needed for Rhinitis Yes Historical Provider, MD   carvedilol (COREG) 6.25 MG tablet Take 1 tablet by mouth 2 times daily (with meals) Yes Nina Belcher MD   Cholecalciferol (VITAMIN D3) 2000 units CAPS Take 1 capsule by mouth daily Double Wednesday and Sunday(2 days will work) Yes Historical Provider, MD   cetirizine (ZYRTEC) 10 MG tablet Take 10 mg by mouth daily as needed for Allergies Yes Historical Provider, MD   Lactobacillus-Inulin ([address]) CAPS Take 1 capsule by mouth daily  Yes Historical Provider, MD   Magnesium Oxide 500 MG CAPS Take by mouth daily For more movements Yes Historical Provider, MD   nitroGLYCERIN (NITROSTAT) 0.4 MG SL tablet Place 0.4 mg under the tongue every 5 minutes as needed for Chest pain. Yes Historical Provider, MD   albuterol-ipratropium (COMBIVENT RESPIMAT)  MCG/ACT AERS inhaler Inhale 1 puff into the lungs every 6 hours as needed for Wheezing. Yes Historical Provider, MD   aspirin 81 MG tablet Take 81 mg by mouth daily. Yes Historical Provider, MD   pravastatin (PRAVACHOL) 40 MG tablet Take 40 mg by mouth daily. Yes Historical Provider, MD   ENTRESTO 49-51 MG per tablet TAKE 1 Aby Pierce MD   potassium chloride (KLOR-CON) 10 MEQ extended release tablet TAKE 1 TABLET BY MOUTH TWICE DAILY  Pardeep Anderson MD   LMWGPCJIU-TTZUORHEK-DMDNUDYCSP PO Place 1 tablet under the tongue every morning (before breakfast) MCKENNA B6, B12, Methylfolate at [employer]. com or [employer]. com  Historical Provider, MD   Calcium Carb-Cholecalciferol 500-400 MG-UNIT TABS Take 1 tablet by mouth 2 times daily  Patient not taking: Reported on 4/8/2021  Shanti Robison DO         Past Medical History:   Diagnosis Date    Asthma, mild intermittent     CAD (coronary artery disease)     mild disease in small vessels per cath 2014. Follows with Shaheen Morales Chronic low back pain     CKD (chronic kidney disease) stage 3, GFR 30-59 ml/min     Controlled substance agreement signed; tramadol 03/27/2019    Dyslipidemia     LYLA (generalized anxiety disorder)     Heart failure with preserved ejection fraction ([address] 05/20/2019    Previously <30%, recovered.  History of gastroesophageal reflux (GERD)     no longer on medication    Homocysteinemia ([facility] 04/18/2016    Hypertension     Hypertensive heart disease with congestive heart failure ([address] 05/17/2019    Left bundle branch block     Obesity (BMI 30-39. 9)     SUNI on CPAP     wears CPAP nightly    Osteoarthritis     Osteopenia     Seasonal allergies     Vitamin D deficiency 05/24/2016       Past Surgical History:   Procedure Laterality Date    CARDIAC CATHETERIZATION  10/30/14    Dr. Leesa Roblero (Boostrix, Adacel) 07/12/2015    Zoster Live (Zostavax) 10/13/2014        Health Maintenance   Topic Date Due    Shingles Vaccine (2 of 3) 12/08/2014    Colon cancer screen colonoscopy  08/17/2015    Annual Wellness Visit (AWV)  04/04/2021    DEXA (modify frequency per FRAX score)  08/14/2021    Lipid screen  10/21/2021    Breast cancer screen  03/17/2022    Potassium monitoring  04/07/2022    Creatinine monitoring  04/07/2022    DTaP/Tdap/Td vaccine (2 - Td) 07/12/2025    Flu vaccine  Completed    Pneumococcal 65+ years Vaccine  Completed    COVID-19 Vaccine  Completed    Hepatitis C screen  Completed    Hepatitis A vaccine  Aged Out    Hepatitis B vaccine  Aged Out    Hib vaccine  Aged Out    Meningococcal (ACWY) vaccine  Aged Out     Recommendations for [employer] Due: see orders and patient instructions/AVS.  . Recommended screening schedule for the next 5-10 years is provided to the patient in written form: see Patient Instructions/AVS.    Kiarra Silverio was seen today for medicare awv and thyroid problem. Diagnoses and all orders for this visit:      Routine general medical examination at a health care facility    Care plan reviewed with and given to patient.        Electronically signed by Jennifer Larios DO on 4/8/2021 at 4:12 PM

## 2021-04-13 ENCOUNTER — OFFICE VISIT (OUTPATIENT)
Dept: FAMILY MEDICINE CLINIC | Age: 76
End: 2021-04-13
Payer: MEDICARE

## 2021-04-13 VITALS
TEMPERATURE: 98.2 F | DIASTOLIC BLOOD PRESSURE: 62 MMHG | SYSTOLIC BLOOD PRESSURE: 118 MMHG | BODY MASS INDEX: 38.8 KG/M2 | RESPIRATION RATE: 14 BRPM | HEART RATE: 85 BPM | OXYGEN SATURATION: 98 % | HEIGHT: 63 IN | WEIGHT: 219 LBS

## 2021-04-13 DIAGNOSIS — R79.83 HOMOCYSTEINEMIA: ICD-10-CM

## 2021-04-13 DIAGNOSIS — I25.10 CORONARY ARTERY DISEASE INVOLVING NATIVE CORONARY ARTERY OF NATIVE HEART WITHOUT ANGINA PECTORIS: ICD-10-CM

## 2021-04-13 DIAGNOSIS — I10 ESSENTIAL HYPERTENSION: ICD-10-CM

## 2021-04-13 DIAGNOSIS — G89.29 CHRONIC BILATERAL LOW BACK PAIN WITHOUT SCIATICA: ICD-10-CM

## 2021-04-13 DIAGNOSIS — M15.9 PRIMARY OSTEOARTHRITIS INVOLVING MULTIPLE JOINTS: ICD-10-CM

## 2021-04-13 DIAGNOSIS — I50.32 CHRONIC HEART FAILURE WITH PRESERVED EJECTION FRACTION (HCC): Primary | ICD-10-CM

## 2021-04-13 DIAGNOSIS — N18.32 STAGE 3B CHRONIC KIDNEY DISEASE (HCC): ICD-10-CM

## 2021-04-13 DIAGNOSIS — I50.22 HYPERTENSIVE HEART DISEASE WITH CHRONIC SYSTOLIC CONGESTIVE HEART FAILURE (HCC): ICD-10-CM

## 2021-04-13 DIAGNOSIS — I11.0 HYPERTENSIVE HEART DISEASE WITH CHRONIC SYSTOLIC CONGESTIVE HEART FAILURE (HCC): ICD-10-CM

## 2021-04-13 DIAGNOSIS — M85.89 OSTEOPENIA OF MULTIPLE SITES: ICD-10-CM

## 2021-04-13 DIAGNOSIS — E78.5 DYSLIPIDEMIA: ICD-10-CM

## 2021-04-13 DIAGNOSIS — M54.50 CHRONIC BILATERAL LOW BACK PAIN WITHOUT SCIATICA: ICD-10-CM

## 2021-04-13 DIAGNOSIS — Z79.899 CONTROLLED SUBSTANCE AGREEMENT SIGNED: ICD-10-CM

## 2021-04-13 PROCEDURE — 4040F PNEUMOC VAC/ADMIN/RCVD: CPT | Performed by: FAMILY MEDICINE

## 2021-04-13 PROCEDURE — 3017F COLORECTAL CA SCREEN DOC REV: CPT | Performed by: FAMILY MEDICINE

## 2021-04-13 PROCEDURE — G8417 CALC BMI ABV UP PARAM F/U: HCPCS | Performed by: FAMILY MEDICINE

## 2021-04-13 PROCEDURE — 1123F ACP DISCUSS/DSCN MKR DOCD: CPT | Performed by: FAMILY MEDICINE

## 2021-04-13 PROCEDURE — 1036F TOBACCO NON-USER: CPT | Performed by: FAMILY MEDICINE

## 2021-04-13 PROCEDURE — 1090F PRES/ABSN URINE INCON ASSESS: CPT | Performed by: FAMILY MEDICINE

## 2021-04-13 PROCEDURE — 99214 OFFICE O/P EST MOD 30 MIN: CPT | Performed by: FAMILY MEDICINE

## 2021-04-13 PROCEDURE — G8427 DOCREV CUR MEDS BY ELIG CLIN: HCPCS | Performed by: FAMILY MEDICINE

## 2021-04-13 PROCEDURE — G8399 PT W/DXA RESULTS DOCUMENT: HCPCS | Performed by: FAMILY MEDICINE

## 2021-04-13 ASSESSMENT — ENCOUNTER SYMPTOMS
CONSTIPATION: 1
APNEA: 1
BACK PAIN: 1
SINUS PAIN: 1

## 2021-04-13 NOTE — PROGRESS NOTES
[id number] [facility] [place] [address] [address]  Dept: [phone]  Dept Fax: [phone]  Loc: [phone]      Jerson Ortega is a 76 y.o. White female. João Campos  presents to the [place] [address] clinic today for   Chief Complaint   Patient presents with    Follow-up     WEE, NOT SEEN SINCE 9//2019    Thyroid Problem     ? pcp watching    Discuss Labs     creatinine, urine    Weight Gain    Joint Pain     knees 2012    Back Pain     and shoulders 2006&9   , and;   1. Chronic heart failure with preserved ejection fraction ([address]    2. Coronary artery disease involving native coronary artery of native heart without angina pectoris    3. Stage 3b chronic kidney disease    4. Dyslipidemia    5. Primary osteoarthritis involving multiple joints    6. Controlled substance agreement signed; tramadol    7. Chronic bilateral low back pain without sciatica    8. Homocysteinemia ([address]    9. Essential hypertension    10. Hypertensive heart disease with chronic systolic congestive heart failure ([address]    11. Osteopenia of multiple sites          I have reviewed Jerson Ortega medical, surgical and other pertinent history in detail, and have updated medication and allergy information in the computerized patient record.      Clinical Care Team:     -Referring Provider for today's consult: self  -Primary Care Provider: DO Killian    Medical/Surgical History:   She  has a past medical history of Asthma, mild intermittent, CAD (coronary artery disease), Chronic low back pain, CKD (chronic kidney disease) stage 3, GFR 30-59 ml/min, Controlled substance agreement signed; tramadol, Dyslipidemia, LYLA (generalized anxiety disorder), Heart failure with preserved ejection fraction ([address] History of gastroesophageal reflux (GERD), Homocysteinemia ([address] Hypertension, Hypertensive heart disease with congestive heart failure ([address] Left bundle branch block, Obesity (BMI 30-39.9), SUNI on CPAP, Osteoarthritis, Osteopenia, Seasonal allergies, and Vitamin D deficiency. Her  has a past surgical history that includes sinus surgery (1990); Rotator cuff repair (Bilateral, 2006  & 2009); Carpal tunnel release (08/2006); Rotator cuff repair (11/2009); Cardiac catheterization (10/30/14); eye surgery (Left, 04/23/2016); eye surgery (Right, 05/24/2006); Carpal tunnel release (Left, 2009); and Colonoscopy (2007). Family/Social History:     Her family history includes Cancer in her brother and father; Dementia in her brother; Diabetes in her maternal grandmother; Glaucoma in her brother; Heart Disease in her maternal grandmother and mother. She  reports that she is a non-smoker but has been exposed to tobacco smoke. She has never used smokeless tobacco. She reports that she does not drink alcohol or use drugs. Medications/Allergies/Immunizations:     Her current medication(s) include   Current Outpatient Medications:     Cyanocobalamin (VITAMIN B 12 PO), Take by mouth daily, Disp: , Rfl:     ENTRESTO 49-51 MG per tablet, TAKE 1 TABLET BY MOUTH TWICE DAILY, Disp: , Rfl:     traMADol (ULTRAM) 50 MG tablet, Take 1 tablet by mouth every 8 hours as needed for Pain for up to 90 days. , Disp: 60 tablet, Rfl: 2    potassium chloride (KLOR-CON M) 10 MEQ extended release tablet, Take 1 tablet by mouth twice daily, Disp: 180 tablet, Rfl: 3    bumetanide (BUMEX) 2 MG tablet, Take 1 tablet by mouth once daily, Disp: 90 tablet, Rfl: 3    sertraline (ZOLOFT) 50 MG tablet, Take 1 tablet by mouth daily, Disp: 90 tablet, Rfl: 3    DHEA 10 MG TABS, Take 5 mg by mouth every morning (before breakfast) MCKENNA 5 mg tab at [employer], Disp: , Rfl:     acetaminophen (TYLENOL) 500 MG tablet, Take 500 mg by mouth nightly as needed for Pain, Disp: , Rfl:     Vitamins-Lipotropics (BALANCE B-100) TABS, Take 1 tablet by mouth 2 times daily (before meals) , Disp: , Rfl:    Omega 3 1000 MG CAPS, Take 1 capsule by mouth 4 times daily (before meals and nightly) (Patient taking differently: Take 1 capsule by mouth 4 times daily (before meals and nightly) [address] [facility]), Disp: 100 capsule, Rfl: 5    Cinnamon 500 MG CAPS, Take 500 mg by mouth 3 times daily , Disp: , Rfl:     fluticasone (FLONASE) 50 MCG/ACT nasal spray, 1 spray by Each Nare route daily as needed for Rhinitis, Disp: , Rfl:     carvedilol (COREG) 6.25 MG tablet, Take 1 tablet by mouth 2 times daily (with meals), Disp: 60 tablet, Rfl: 3    Cholecalciferol (VITAMIN D3) 2000 units CAPS, Take 1 capsule by mouth daily Double Wednesday and Sunday(2 days will work), Disp: , Rfl:     cetirizine (ZYRTEC) 10 MG tablet, Take 10 mg by mouth daily as needed for Allergies, Disp: , Rfl:     Lactobacillus-Inulin ([address]) CAPS, Take 1 capsule by mouth daily , Disp: , Rfl:     Magnesium Oxide 500 MG CAPS, Take by mouth daily For more movements, Disp: , Rfl:     nitroGLYCERIN (NITROSTAT) 0.4 MG SL tablet, Place 0.4 mg under the tongue every 5 minutes as needed for Chest pain., Disp: , Rfl:     albuterol-ipratropium (COMBIVENT RESPIMAT)  MCG/ACT AERS inhaler, Inhale 1 puff into the lungs every 6 hours as needed for Wheezing., Disp: , Rfl:     aspirin 81 MG tablet, Take 81 mg by mouth daily. , Disp: , Rfl:     pravastatin (PRAVACHOL) 40 MG tablet, Take 40 mg by mouth daily. , Disp: , Rfl:     UUMBGIEXN-GRFFTXZUY-JIUPUOFLNY PO, Place 1 tablet under the tongue every morning (before breakfast) MCKENNA B6, B12, Methylfolate at [employer] or [employer]. com, Disp: , Rfl:   Allergies: Duricef [cefadroxil]  Immunizations:   Immunization History   Administered Date(s) Administered    COVID-19, Moderna, PF, 100mcg/0.5mL 03/02/2021, 04/01/2021    Influenza Vaccine, unspecified formulation 12/21/2016    Influenza Virus Vaccine 12/02/2015    Influenza, Quadv, adjuvanted, 65 yrs +, IM, PF (Fluad) 10/06/2020    Influenza, Triv, inactivated, subunit, adjuvanted, IM (Fluad 65 yrs and older) 11/29/2018, 12/16/2019    Pneumococcal Conjugate 13-valent ([id number]) 12/02/2015    Pneumococcal Polysaccharide ([id number]) 10/12/2011    Tdap (Boostrix, Adacel) 07/12/2015    Zoster Live (Zostavax) 10/13/2014        History of Present Illness:     Alana's had concerns including Follow-up (WEE, NOT SEEN SINCE 9//2019), Thyroid Problem (? pcp watching), Discuss Labs (creatinine, urine), Weight Gain, Joint Pain (knees 2012), and Back Pain (and shoulders 2006&9). Zara Chi  presents to the [address] [place] [address] today for;   Chief Complaint   Patient presents with    Follow-up     WEE, NOT SEEN SINCE 9//2019    Thyroid Problem     ? pcp watching    Discuss Labs     creatinine, urine    Weight Gain    Joint Pain     knees 2012    Back Pain     and shoulders 2006&9   , abnormal labs follow up and these conditions as she  Is looking today for:     1. Chronic heart failure with preserved ejection fraction ([address]    2. Coronary artery disease involving native coronary artery of native heart without angina pectoris    3. Stage 3b chronic kidney disease    4. Dyslipidemia    5. Primary osteoarthritis involving multiple joints    6. Controlled substance agreement signed; tramadol    7. Chronic bilateral low back pain without sciatica    8. Homocysteinemia ([address]    9. Essential hypertension    10. Hypertensive heart disease with chronic systolic congestive heart failure ([address]    11. Osteopenia of multiple sites      HPI    Subjective:     Review of Systems   Constitutional: Positive for fatigue and unexpected weight change. HENT: Positive for congestion, dental problem and sinus pain. Eyes: Positive for visual disturbance. Respiratory: Positive for apnea. Cardiovascular: Positive for leg swelling. Gastrointestinal: Positive for constipation. Endocrine: Negative.     Genitourinary: Positive for frequency and menstrual problem. Musculoskeletal: Positive for arthralgias, back pain, gait problem, joint swelling and myalgias. Skin: Negative. Allergic/Immunologic: Positive for environmental allergies. Neurological: Positive for dizziness and light-headedness. Hematological: Bruises/bleeds easily. Psychiatric/Behavioral: Negative. All other systems reviewed and are negative. Objective:     /62 (Site: Right Upper Arm, Position: Sitting, Cuff Size: Large Adult)   Pulse 85   Temp 98.2 °F (36.8 °C) (Oral)   Resp 14   Ht 5' 3\" (1.6 m)   Wt 219 lb (99.3 kg)   SpO2 98%   BMI 38.79 kg/m²   Physical Exam  Vitals signs and nursing note reviewed. Constitutional:       Appearance: Normal appearance. HENT:      Head: Normocephalic. Pulmonary:      Effort: Pulmonary effort is normal.   Neurological:      Mental Status: She is alert. Psychiatric:         Mood and Affect: Mood normal.         Thought Content: Thought content normal.            Laboratory Data:   Lab results were searched in Care Everywhere and/or those brought by the pateint were reviewed today with João Campos and she has a copy of their most recent labs to take home with them as notedbelow;       Imaging Data:   Imaging Data:       Assessment & Plan:       Impression:  1. Chronic heart failure with preserved ejection fraction ([address]    2. Coronary artery disease involving native coronary artery of native heart without angina pectoris    3. Stage 3b chronic kidney disease    4. Dyslipidemia    5. Primary osteoarthritis involving multiple joints    6. Controlled substance agreement signed; tramadol    7. Chronic bilateral low back pain without sciatica    8. Homocysteinemia ([address]    9. Essential hypertension    10. Hypertensive heart disease with chronic systolic congestive heart failure ([address]    11.  Osteopenia of multiple sites      Assessment and Plan:  After reviewing the patients chief complaints, reviewing their labfindings in great detail (with the patient and those accompanying them) which correlate to their chief complaints, symptoms, and or medical conditions; suggestions were made relating to changes in diet and or supplements which may improve the complaints and which will be reflected in their future lab findings; Chief Complaint   Patient presents with    Follow-up     WEE, NOT SEEN SINCE 9//2019    Thyroid Problem     ? pcp watching    Discuss Labs     creatinine, urine    Weight Gain    Joint Pain     knees 2012    Back Pain     and shoulders 2006&9   ;    Plans for the next visits:  - Abnormal and non-optimal Labs were ordered today to be repeated in the next 120-365 days to assess changes from adjustments in nutrition and or nutrients. - Patient instructed when having a blood draw to ask the  to divide their lab draws into multiple draws over several days if not feeling good at the time of the lab draw or if either prefers to do several smaller blood draws over several days  -Patient instructed to check with insurer before each lab draw and to to to the lab which the insurer directs them for the most cost effective lab draw with the least patient's cost  - Peyton Allen  will be scheduled subsequent to those results. Tito Queen will bring in her drink and food log to her next visit    Chronic Problems Addressed on this Visit:                                   1.  Intensity of Service; Uncontrolled items at this visit; Chief Complaint   Patient presents with    Follow-up     WEE, NOT SEEN SINCE 9//2019    Thyroid Problem     ? pcp watching    Discuss Labs     creatinine, urine    Weight Gain    Joint Pain     knees 2012    Back Pain     and shoulders 2006&9   ;              Improved items at this visit; Stable items at this visit;  2.  Patients food and drinks were reviewed with the patient,       - Peyton Allen will bring food+drink symptom log to next visit for inclusion in their record      - 75 better food list fordetails on reviewed supplements, or at the least review the nutrient files at [address] at [facility], [address] bark, an insulin mimetic, reduces some High Carbohydrate Dietary Impacts. Methylhydroxychalcone polymers insulin-enhancing properties in fat cells are responsible for enhanced glucose uptake, inhibiting hepatic HMG-CoA reductase and lowers lipids. www.jacn. org/content/20/4/327.full     But cinnamon with additivessuch as Cinnamon Extract are not effective as insulin mimetics.  :eStoreDirectory.at     Nutrients for Start up from [employer] or [employer] [place] for ease to get started now;  Gabriel Serna has some useable products;  - Triple Strength Fish Oil, enteric coated  - Vit D 3 5000 IU gel caps  - Iron ferrous sulfat 325 mg tabs  - Centrum Silver look-a-like for most patients, or  - Centrum plain look-a-like if need iron    Local pharmacies or chains such as [facility], [facility], [facility], have;  - Triple Strength Fish Oil (enteric coated if available) or    If not enteric coated, can take from freezer for less burps  - B-50 or B-100 released balanced B complex tabs  - Cinnamon bark 500 mg (without Chromium or extracts)   some brands list 1000 mg / serving of 2 capsules,    some brands have 1000 mg caps with the undesireable chromium / extract  - Calcium carbonate/citrate, magnesium oxide/citrate, Vit D 3  as 3-4 tabs/caps/serving     Some Local Brands may contain Zinc which is acceptable for the first bottle or two  - Magnesium oxide 250 mg tabs for those having < 2 bowel movements daily  - Magnesium citrate 200 mg if having > 2bowel movement/day  - Centrum Silver or look-a-like for most patients, Centrum plain or look-a-like with iron  - Vitamin D-3 comes as 1,000 IU or 2,000 IU or 5,000 IU gel caps or Liquid drops      Some brands containing or derived from soy oil or corn oil are OK if not allergic to soy  - Elemental Iron 65 mg tabs at bedtime is available over the counter if need more iron     Usually turns bowel movements grey, green, or black but not a concern  - Apricot Kernel Oil (by Now) for dry skin sensitive perineal or perianal area skin    Nutrients for ongoing use by Mail order for less expense from [employer] ;  - Strength Fish Oil , 240 Softgels Item #879972  -B-100 time released balanced B complex Item #126879  - Cinnamon bark 500 mg without Chromium or extract Item #160059  - Calcium carbonate 1000 mg, Magnesium oxide 500 mg, Vit D 3  400 IU Item #997486  - Magnesium oxide 500 mg tabs Item #743304 if less than 2 bowel movements daily  - [employer] Item #787112 for most patients, One Daily Item #634578 with iron  - Vit D 3  1,000 Item #430780      2,000 IU Item #[id number]   Item #339872     Some brands containing orderived from soy oil or corn oil are OK if not allergic to soy    Nutrients for Special Needs by Mail order for less expense from www. [employer]itan.com ;  -Elemental Iron 65 mg tabs Item #554301 if need more iron for low iron on labs    Usually turns bowel movements grey, green or black but not a concern  - Time released Niacin 250 mg Item #683549 for cold intolerance, low libido or impotence  - DHEA 50 mg Item #812732 for improving DHEA levels on labs if having Fatigue    If stools too loose substitute for your Magnesium oxide using;   Magnesium citrate 200 mg tabs (NOT liquid) at [employer]   Magnesium gluconate 550 mg by Paul Fernando at [place] or [employer]. com  Magnesium chloride foot soaks or body sprays  www.[employer]   Magnesium chloride flakes 14.99 Item #: [id number] if Back ordered get spray    Food Drink Symptom Log;  I asked this patient to track these items and any other symptoms on their list on a weekly basis to documenttheir progress or lack of same.  This can be done on the symptom tracking sheet I gave them at today's visit but looks like this:                                                      Rate on scale of 0-10 with zero = not noticeable  Symptom:                            Week 1               2                 3                 4               Etc            Hair loss    Foot cramps    Paresthesia    Aches    IBS (irritable bowel)    Constipation    Diarrhea  Nocturia (up to bathroom at night)    Fatigue/Energy level  Stress      On the other side of the sheet they can track their food, drink, environment, activity, symptoms etc      Avoiding Latex-like proteins in my foods; Avocados, Bananas, Celery, Figs & Kiwi proteins have latex-like proteins to inflame our immune systems  How Can I Have A Latex Allergy? Eating foods with latex-like protein exposes us to latex allergies. Our body cannot tell the differencebetween these latex-like proteins and latex from rubber products since many people are allergic to fruit, vegetables and latex. Read labels on pre-packaged foods. This list to avoid is only a guide if you are known allergicto latex or have a latex rash on your chin, cheeks and lines on your neck and chest. The amount of latex is different in each food product or fruit variety. to Avoid out of Season if not grown locally: Melon, Nectarine, Papaya, Cherry, Passion fruit, Plum, Chestnuts, and Tomato. Avocado, Banana, Celery, Figs, and Kiwi always contain Latex-like protein. Whats in Season? Strawberries taste better in June than December because June is strawberry season so buy locally grown produce \"in season\" for the best flavor, cost and less Latex. Locally grown produce notonly tastes great requires little of no ethylene exposure in food distribution so has less latex content. Out of season, use canned, frozen or dried since processed ripe and are latex lower!!!     Month     [place] Locally Grown Produce  January, February, March: use canned, frozen or dried fruits since lower in latex  April: asparagus, radishes  May: asparagus, broccoli, green onions, greens, peas, radishes, rhubarb June: asparagus, beets, beans, broccoli, cabbage, cantaloupe, carrots, green onions, greens, lettuce, onions, parsley, peas, radishes, rhubarb, strawberries, watermelons  July: beans, beets, blueberries, broccoli, cabbage, cantaloupe, carrots, cauliflower, celery, cucumbers, eggplant, grapes, green onions, greens, lettuce, onions, parsley, peas, peaches, bell peppers, potatoes, radishes, summer raspberries, squash, sweetcorn, tomatoes, turnips, watermelons  August: apples, beans, beets, blueberries, cabbage, cantaloupe, carrots, cauliflower, celery, cucumbers, eggplant, grapes, green onions, greens, lettuce, onions, parsley, peas, peaches, pears, bell peppers, potatoes, radishes, squash, sweet corn, tomatoes, turnips, watermelons  September: apples, beans, beets, blueberries, cabbage, cantaloupe, carrots, cauliflower, celery, cucumbers, eggplant, grapes, green onions, greens, lettuce, onions, parsley, peas, peaches, pears, bell peppers, plums, potatoes, pumpkins, radishes, fall red raspberries, squash, sweet corn, tomatoes, turnips, watermelons  October: apples, beets, broccoli, cabbage, carrots, cauliflower, celery, green onions, greens, lettuce, parsley, peas, pears, potatoes, pumpkins, radishes, fall red raspberries, squash, turnips  November: broccoli, cabbage, carrots, parsley, pears, peas  December: use canned, frozen or dried fruits since lower in latex    Upto half of latex-sensitive patients show allergic reactions to fruits (avocados, bananas, kiwifruits, papayas, peaches),   Annals of Allergy, 1994. These plants contain the same proteins that are allergens in latex. People with fruit allergies should warn physicians before undergoing procedures which may cause anaphylactic reaction if in contact with latex gloves.   Some of the common foods with defined cross-reactivity to latex are avocado, banana, kiwi, chestnut, raw potato, tomato, stone fruits (e.g., peach, cherry), hazelnut, melons, celery, carrot, apple, pear, papaya, and almond. Foods with less well-defined cross-reactivity to latex are peanuts, peppers, citrus fruits, coconut, pineapple, john, fig, passion fruit, Ugli fruit, and grape. This fruit/latex cross-reactivity is worsened by ethylene, a gas used to hasten commercial ripening. In nature, plants produce low levels of the hormone ethylene, which regulates germination, flowering, and ripening. Forced ripening by high ethylene concentrations, plants produce allergenic wound-repair proteins, which are similar to wound-repair proteins made during the tapping of rubber trees. Sensitive individuals who ingest the fruit get a higher dose and worse reaction. Some people may even first become sensitized to latex through fruit. Can food processing increase the concentrations of allergenic proteins? Latex-sensitized children (and adults) in [place] often experience allergic reactions after eating bananas ripened artificially with ethylene. In the [place], food distribution centers treat unripe bananas and other produce with ethylene to ripen; not commonly done in [facility] since fruit is tree-ripened there. Does treatment of food with ethylene induce banana proteins that cross-react with latex? (Blayne et al.)    References:   Latex in Foods Allergy, http://ehp.niehs.nih.gov/members/2003/5811/5811.html    Search web for [employer] in Season \" for whereyou live or are at the time you food shop  www.nutritioncouncil.org/pdf/healthy/SeasonalProduce. pdf ,   Management of Latex, ://medicalcenter. [employer].edu/  search for latex

## 2021-04-13 NOTE — PATIENT INSTRUCTIONS
Thank you   1. Thank you for trusting us with your healthcare needs. You may receive a survey regarding today's visit. It would help us out if you would take a few moments to provide your feedback. We value your input. 2. Please bring in ALL medications BOTTLES, including inhalers, herbal supplements, over the counter, prescribed & non-prescribed medicine. The office would like actual medication bottles and a list.   3. Please note our OFFICE POLICIES:   a. Prior to getting your labs drawn, please check with your insurance company for benefits and eligibility of lab services. Often, insurance companies cover certain tests for preventative visits only. It is patient's responsibility to see what is covered. b. We are unable to change a diagnosis after the test has been performed. c. Lab orders will not be re-printed. Please hold onto your original lab orders and take them to your lab to be completed. d. If you no show your scheduled appointment three times, you will be dismissed from this practice. e. Ebb Sears must be completed 24 hours prior to your schedule appointment. 4. If the list below has been completed, PLEASE FAX RECORDS TO OUR OFFICE @ [phone].  Once the records have been received we will update your records at our office:  Health Maintenance Due   Topic Date Due    Shingles Vaccine (2 of 3) 12/08/2014    Colon cancer screen colonoscopy  08/17/2015

## 2021-04-22 DIAGNOSIS — F41.1 GAD (GENERALIZED ANXIETY DISORDER): Chronic | ICD-10-CM

## 2021-06-21 ENCOUNTER — NURSE ONLY (OUTPATIENT)
Dept: LAB | Age: 76
End: 2021-06-21

## 2021-06-21 DIAGNOSIS — E03.9 HYPOTHYROIDISM, UNSPECIFIED TYPE: ICD-10-CM

## 2021-06-21 LAB
T4 FREE: 1.16 NG/DL (ref 0.93–1.76)
TSH SERPL DL<=0.05 MIU/L-ACNC: 3.83 UIU/ML (ref 0.4–4.2)

## 2021-06-22 LAB — T3 FREE: 2.82 PG/ML (ref 2.02–4.43)

## 2021-06-23 LAB — THYROID PEROXIDASE ANTIBODY: < 4 IU/ML (ref 0–25)

## 2021-06-24 ENCOUNTER — TELEPHONE (OUTPATIENT)
Dept: FAMILY MEDICINE CLINIC | Age: 76
End: 2021-06-24

## 2021-06-24 NOTE — TELEPHONE ENCOUNTER
----- Message from Nando Savage, DO sent at 6/23/2021  8:30 PM EDT -----  Please let pt know that thyroid labs are all WNL  Let me know if questions, thanks!

## 2021-06-28 ENCOUNTER — OFFICE VISIT (OUTPATIENT)
Dept: FAMILY MEDICINE CLINIC | Age: 76
End: 2021-06-28
Payer: MEDICARE

## 2021-06-28 VITALS
RESPIRATION RATE: 16 BRPM | OXYGEN SATURATION: 96 % | WEIGHT: 215 LBS | TEMPERATURE: 99 F | HEIGHT: 62 IN | SYSTOLIC BLOOD PRESSURE: 122 MMHG | BODY MASS INDEX: 39.56 KG/M2 | HEART RATE: 95 BPM | DIASTOLIC BLOOD PRESSURE: 60 MMHG

## 2021-06-28 DIAGNOSIS — R10.30 LOWER ABDOMINAL PAIN: Primary | ICD-10-CM

## 2021-06-28 LAB
BILIRUBIN URINE: NEGATIVE
BLOOD URINE, POC: NEGATIVE
CHARACTER, URINE: CLEAR
COLOR, URINE: YELLOW
GLUCOSE URINE: NEGATIVE MG/DL
KETONES, URINE: NEGATIVE
LEUKOCYTE CLUMPS, URINE: ABNORMAL
NITRITE, URINE: NEGATIVE
PH, URINE: 5.5 (ref 5–9)
PROTEIN, URINE: NEGATIVE MG/DL
SPECIFIC GRAVITY, URINE: 1.01 (ref 1–1.03)
UROBILINOGEN, URINE: 0.2 EU/DL (ref 0–1)

## 2021-06-28 PROCEDURE — G8417 CALC BMI ABV UP PARAM F/U: HCPCS | Performed by: FAMILY MEDICINE

## 2021-06-28 PROCEDURE — G8399 PT W/DXA RESULTS DOCUMENT: HCPCS | Performed by: FAMILY MEDICINE

## 2021-06-28 PROCEDURE — 1090F PRES/ABSN URINE INCON ASSESS: CPT | Performed by: FAMILY MEDICINE

## 2021-06-28 PROCEDURE — 1123F ACP DISCUSS/DSCN MKR DOCD: CPT | Performed by: FAMILY MEDICINE

## 2021-06-28 PROCEDURE — 1036F TOBACCO NON-USER: CPT | Performed by: FAMILY MEDICINE

## 2021-06-28 PROCEDURE — G8427 DOCREV CUR MEDS BY ELIG CLIN: HCPCS | Performed by: FAMILY MEDICINE

## 2021-06-28 PROCEDURE — 99213 OFFICE O/P EST LOW 20 MIN: CPT | Performed by: FAMILY MEDICINE

## 2021-06-28 PROCEDURE — 4040F PNEUMOC VAC/ADMIN/RCVD: CPT | Performed by: FAMILY MEDICINE

## 2021-06-28 NOTE — PATIENT INSTRUCTIONS
Patient Education        Abdominal Pain: Care Instructions  Your Care Instructions     Abdominal pain has many possible causes. Some aren't serious and get better on their own in a few days. Others need more testing and treatment. If your pain continues or gets worse, you need to be rechecked and may need more tests to find out what is wrong. You may need surgery to correct the problem. Don't ignore new symptoms, such as fever, nausea and vomiting, urination problems, pain that gets worse, and dizziness. These may be signs of a more serious problem. Your doctor may have recommended a follow-up visit in the next 8 to 12 hours. If you are not getting better, you may need more tests or treatment. The doctor has checked you carefully, but problems can develop later. If you notice any problems or new symptoms, get medical treatment right away. Follow-up care is a key part of your treatment and safety. Be sure to make and go to all appointments, and call your doctor if you are having problems. It's also a good idea to know your test results and keep a list of the medicines you take. How can you care for yourself at home? · Rest until you feel better. · To prevent dehydration, drink plenty of fluids. Choose water and other caffeine-free clear liquids until you feel better. If you have kidney, heart, or liver disease and have to limit fluids, talk with your doctor before you increase the amount of fluids you drink. · If your stomach is upset, eat mild foods, such as rice, dry toast or crackers, bananas, and applesauce. Try eating several small meals instead of two or three large ones. · Wait until 48 hours after all symptoms have gone away before you have spicy foods, alcohol, and drinks that contain caffeine. · Do not eat foods that are high in fat. · Avoid anti-inflammatory medicines such as aspirin, ibuprofen (Advil, Motrin), and naproxen (Aleve). These can cause stomach upset.  Talk to your doctor if you take daily aspirin for another health problem. When should you call for help? Call 911 anytime you think you may need emergency care. For example, call if:    · You passed out (lost consciousness).     · You pass maroon or very bloody stools.     · You vomit blood or what looks like coffee grounds.     · You have new, severe belly pain. Call your doctor now or seek immediate medical care if:    · Your pain gets worse, especially if it becomes focused in one area of your belly.     · You have a new or higher fever.     · Your stools are black and look like tar, or they have streaks of blood.     · You have unexpected vaginal bleeding.     · You have symptoms of a urinary tract infection. These may include:  ? Pain when you urinate. ? Urinating more often than usual.  ? Blood in your urine.     · You are dizzy or lightheaded, or you feel like you may faint. Watch closely for changes in your health, and be sure to contact your doctor if:    · You are not getting better after 1 day (24 hours). Where can you learn more? Go to [email or web address] and sign in to your [employer] account. Enter A862 in the [employer] box to learn more about \"Abdominal Pain: Care Instructions. \"     If you do not have an account, please click on the \"Sign Up Now\" link. Current as of: October 19, 2020               Content Version: 12.9  © 2934-4661 [employer]wise, Incorporated. Care instructions adapted under license by [facility] ([facility]). If you have questions about a medical condition or this instruction, always ask your healthcare professional. [place] [address] any warranty or liability for your use of this information.

## 2021-06-28 NOTE — PROGRESS NOTES
Chief Complaint   Patient presents with    Dysuria     low abd pain for past 3 days, no pain with urination, no back pain        History obtained from the patient. SUBJECTIVE:  Jacky Walker is a 68 y.o. female that presents today for     -Abdominal Pain    HPI:    Lower abd pain  Started 3 days ago  No LUTS  Has been constipated, goes daily, but not a lot and hard  Had BM's today, still hard, but helped  Pain noted mostly if pushes on bilat lower abdomen or sits a certain way  Is mild  Comes and goes  Achy      Description: aching   Provoking Factors - as above  Alleviating Factors - as above  Severity - mild   Radiation: No    Change in pain with eating? No  Change in pain with BM? Yes  Nausea? no  Vomiting? no  Diarrhea? no  Constipation?  yes  Blood in Stools? no  Dysuria/Change in Urinary Frequency/Hematuria? no  Back Pain? no      Age/Gender Health Maintenance    Lipid -   Lab Results   Component Value Date    CHOL 178 10/21/2020    CHOL 183 01/10/2020    CHOL 179 07/19/2019     Lab Results   Component Value Date    TRIG 98 10/21/2020    TRIG 187 01/10/2020    TRIG 74 07/19/2019     Lab Results   Component Value Date    HDL 67 10/21/2020    HDL 69 01/10/2020    HDL 57 07/19/2019     Lab Results   Component Value Date    LDLCALC 91 10/21/2020    [address] 01/10/2020    LDLCALC 107 07/19/2019     No results found for: LABVLDL, VLDL  No results found for: CHOLHDLRATIO      TSH -   Lab Results   Component Value Date    TSH 3.830 06/21/2021       DM Screen -   Lab Results   Component Value Date    GLUCOSE 99 04/07/2021    GLUCOSE 95 10/08/2019       Colon Cancer Screening - neg with cecal ulcer AUG 2010, repeat 5 years per [facility] d/t fam hx; pt plans to call Dr. Farley Husbands SEPT 2019/JAN 2020/OCT 2020/APR 2021  Lung Cancer Screening (Age 54 to [de-identified] with 30 pack year hx, current smoker or quit within past 15 years) - never smoker    Tetanus - UTD July 2015  Influenza Vaccine - UTD OCT 2020  Pneumonia Vaccine - UTD PPV 23 and PCV 13  Zostavax - Zostavax OCT 2014   Shingrix - to get at pharmacy per medicare rules    Breast Cancer Screening - NEG MARCH 2021    Osteoporosis Screening - July 2017:   FRAX 10 year probability of major osteoporotic fracture is 9.9%    and hip fracture is 1.7%.         IMPRESSION: OSTEOPENIA   Patient is at medium risk for fracture.  Patient consult    w/primary care provider is recommended. AUG 2019  BMD change vs previous is -4.0% for the hips.  BMD change vs    previous is 3.0% for the spine.       FRAX 10 year probability of major osteoporotic fracture is 11%    and hip fracture is 2.3%.         IMPRESSION: OSTEOPENIA   Patient is at medium risk for fracture.          Specialist List:  Cardio: Swapna Magana  CPAP/Pulm: Christen  Integrative Med: Krystyna Horner      Current Outpatient Medications   Medication Sig Dispense Refill    sertraline (ZOLOFT) 50 MG tablet Take 1 tablet by mouth once daily 90 tablet 3    Cyanocobalamin (VITAMIN B 12 PO) Take by mouth daily      ENTRESTO 49-51 MG per tablet TAKE 1 TABLET BY MOUTH TWICE DAILY      traMADol (ULTRAM) 50 MG tablet Take 1 tablet by mouth every 8 hours as needed for Pain for up to 90 days. 60 tablet 2    potassium chloride (KLOR-CON M) 10 MEQ extended release tablet Take 1 tablet by mouth twice daily 180 tablet 3    bumetanide (BUMEX) 2 MG tablet Take 1 tablet by mouth once daily 90 tablet 3    JNESYVWCW-HGYZLISCP-XTGJVNOFZZ PO Place 1 tablet under the tongue every morning (before breakfast) MCKENNA B6, B12, Methylfolate at [employer] or amaz[employer]. com      DHEA 10 MG TABS Take 5 mg by mouth every morning (before breakfast) MCKENNA 5 mg tab at [employer]      acetaminophen (TYLENOL) 500 MG tablet Take 500 mg by mouth nightly as needed for Pain      Vitamins-Lipotropics (BALANCE B-100) TABS Take 1 tablet by mouth 2 times daily (before meals)       Omega 3 1000 MG CAPS Take 1 capsule by mouth 4 times daily (before meals and nightly) (Patient taking differently: Take 1 capsule by mouth 4 times daily (before meals and nightly) [id number] [facility]) 100 capsule 5    Cinnamon 500 MG CAPS Take 500 mg by mouth 3 times daily       fluticasone (FLONASE) 50 MCG/ACT nasal spray 1 spray by Each Nare route daily as needed for Rhinitis      carvedilol (COREG) 6.25 MG tablet Take 1 tablet by mouth 2 times daily (with meals) 60 tablet 3    Cholecalciferol (VITAMIN D3) 2000 units CAPS Take 1 capsule by mouth daily Double Wednesday and Sunday(2 days will work)     [facility] cetirizine (ZYRTEC) 10 MG tablet Take 10 mg by mouth daily as needed for Allergies      Lactobacillus-Inulin ([address]) CAPS Take 1 capsule by mouth daily       Magnesium Oxide 500 MG CAPS Take by mouth daily For more movements      nitroGLYCERIN (NITROSTAT) 0.4 MG SL tablet Place 0.4 mg under the tongue every 5 minutes as needed for Chest pain.  albuterol-ipratropium (COMBIVENT RESPIMAT)  MCG/ACT AERS inhaler Inhale 1 puff into the lungs every 6 hours as needed for Wheezing.  aspirin 81 MG tablet Take 81 mg by mouth daily.  pravastatin (PRAVACHOL) 40 MG tablet Take 40 mg by mouth daily. No current facility-administered medications for this visit. No orders of the defined types were placed in this encounter. All medications reviewed and reconciled, including OTC and herbal medications. Updated list given to patient. Patient Active Problem List    Diagnosis Date Noted    Tonsillar mass 10/06/2020    CKD (chronic kidney disease) stage 3, GFR 30-59 ml/min ([facility])     Heart failure with preserved ejection fraction ([address] 05/20/2019     Previously <30%, recovered.        Hypertensive heart disease with congestive heart failure ([facility] 05/17/2019    Controlled substance agreement signed; tramadol 03/27/2019    Asthma, mild intermittent     History of gastroesophageal reflux (GERD)     SUNI on CPAP      wears CPAP nightly      Osteopenia     Seasonal allergies     Osteoarthritis     CAD (coronary artery disease)      mild disease in small vessels per cath 2014. Follows with Jose      Left bundle branch block     Hypertension     Dyslipidemia     Obesity (BMI 30-39. 9)     Vitamin D deficiency 05/24/2016    Homocysteinemia ([address] 04/18/2016    Chronic low back pain     LYLA (generalized anxiety disorder)        Past Medical History:   Diagnosis Date    Asthma, mild intermittent     CAD (coronary artery disease)     mild disease in small vessels per cath 2014. Follows with Arlene Suggs    Chronic low back pain     CKD (chronic kidney disease) stage 3, GFR 30-59 ml/min ([facility])     Controlled substance agreement signed; tramadol 03/27/2019    Dyslipidemia     LYLA (generalized anxiety disorder)     Heart failure with preserved ejection fraction ([address] 05/20/2019    Previously <30%, recovered.  History of gastroesophageal reflux (GERD)     no longer on medication    Homocysteinemia ([address] 04/18/2016    Hypertension     Hypertensive heart disease with congestive heart failure ([address] 05/17/2019    Left bundle branch block     Obesity (BMI 30-39. 9)     SUNI on CPAP     wears CPAP nightly    Osteoarthritis     Osteopenia     Seasonal allergies     Vitamin D deficiency 05/24/2016         Past Surgical History:   Procedure Laterality Date    CARDIAC CATHETERIZATION  10/30/14    Dr. Hermelinda Benítez  08/2006    Ambrose Fulton Left 2009    COLONOSCOPY  2007    EYE SURGERY Left 04/23/2016    Dr. Giuliano Pena Right 05/24/2006    Dr. Shamika Yost Bilateral 2006  & 2009    ROTATOR CUFF REPAIR  11/2009    SINUS SURGERY  1990         Allergies   Allergen Reactions    Duricef [Cefadroxil]          Social History     Tobacco Use    Smoking status: Passive Smoke Exposure - Never Smoker    Smokeless tobacco: Never Used   Substance Use Topics    Alcohol use: No         Family History   Problem Relation Age of Onset    Heart Disease Mother     Cancer Father     Cancer Brother     Dementia Brother     Diabetes Maternal Grandmother     Heart Disease Maternal Grandmother     Glaucoma Brother          I have reviewed the patient's past medical history, past surgical history, allergies, medications, social and family history and I have made updates where appropriate. Review of Systems  Positive responses are highlighted in bold    Constitutional:  Fever, Chills, Night Sweats, Fatigue, Unexpected changes in weight  HENT:  Ear pain, Tinnitus, Nosebleeds, Trouble swallowing, Hearing loss, Sore throat  Cardiovascular:  Chest Pain, Palpitations, Orthopnea, Paroxysmal Nocturnal Dyspnea  Respiratory:  Cough, Wheezing, Shortness of breath, Chest tightness, Apnea  Gastrointestinal:  Nausea, Vomiting, Diarrhea, Constipation, Heartburn, Blood in stool  Genitourinary:  Difficulty or painful urination, Flank pain, Change in frequency, Urgency  Skin:  Color change, Rash, Itching, Wound  Musculoskeletal:  Joint pain, Back pain, Gait problems, Joint swelling, Myalgias  Neurological:  Dizziness, Headaches, Presyncope, Numbness, Seizures, Tremors  Endocrine:  Heat Intolerance, Cold Intolerance, Polydipsia, Polyphagia, Polyuria      PHYSICAL EXAM:  Vitals:    06/28/21 1618   BP: 122/60   Pulse: 95   Resp: 16   Temp: 99 °F (37.2 °C)   TempSrc: Oral   SpO2: 96%   Weight: 215 lb (97.5 kg)   Height: 5' 1.5\" (1.562 m)     Body mass index is 39.97 kg/m². Pain Score:   0 - No pain    VS Reviewed  General Appearance: A&O x 3, No acute distress,well developed and well- nourished  Eyes: pupils equal, round, and reactive to light, extraocular eye movements intact, conjunctivae and eye lids without erythema  ENT: external ear and ear canal clear bilaterally, TMs intact and regular, nose without deformity, nasal mucosa and turbinates normal without polyps, oropharynx normal, dentition is normal for age.    Neck: supple and non-tender without mass, no thyromegaly or thyroid nodules, no cervical lymphadenopathy  Pulmonary/Chest: clear to auscultation bilaterally- no wheezes, rales or rhonchi, normal air movement, no respiratory distress or retractions  Cardiovascular: S1 and S2 auscultated w/ RRR. No murmurs, rubs, clicks, or gallops, distal pulses intact. Abdomen: soft, non-tender, non-distended, bowel sounds physiologic,  no rebound or guarding, no masses or hernias noted. Liver and spleen without enlargement. Extremities: no cyanosis, clubbing or edema of the lower extremities. +2 PT/DP bilaterally. Musculoskeletal: No joint swelling or gross deformity   Skin: warm and dry, no rash or erythema      Results for POC orders placed in visit on 06/28/21   POCT Urinalysis No Micro (Auto)   Result Value Ref Range    Glucose, Ur Negative NEGATIVE mg/dl    Bilirubin Urine Negative     Ketones, Urine Negative NEGATIVE    Specific Gravity, Urine 1.015 1.002 - 1.030    Blood, UA POC Negative NEGATIVE    pH, Urine 5.50 5.0 - 9.0    Protein, Urine Negative NEGATIVE mg/dl    Urobilinogen, Urine 0.20 0.0 - 1.0 eu/dl    Nitrite, Urine Negative NEGATIVE    Leukocyte Clumps, Urine Trace (A) NEGATIVE    Color, Urine Yellow YELLOW-STRAW    Character, Urine Clear [id number]       ASSESSMENT & PLAN  1. Lower abdominal pain    Hx and exam most c/w constipation  VSS  Exam reassuring  YA pretty neg    Plan:  Start miralax daily, goal 1 to 2 soft stools per day  If not much better by later this wk or next wk, f/u in office. Sooner any changes  Send urine off for culture  Reviewed ER precautions, pt understands. - Culture, Urine; Future      DISPOSITION    Return if symptoms worsen or fail to improve. Don WHITE released without restrictions.     Future Appointments   Date Time Provider [facility]   8/12/2021  1:00 PM Brie Charles MD [facility] - [address] [facility]   10/11/2021  2:00 PM Quoc Diamond DO [address] [facility]    Barriers to learning and self management: none    Discussed use, benefit, and side effects of prescribed medications. Barriers to medication compliance addressed. All patient questions answered. Pt voiced understanding.        Electronically signed by Ambrose Kunz DO on 6/28/2021 at 4:41 PM

## 2021-06-30 LAB
ORGANISM: ABNORMAL
URINE CULTURE, ROUTINE: ABNORMAL
URINE CULTURE, ROUTINE: ABNORMAL

## 2021-07-01 ENCOUNTER — TELEPHONE (OUTPATIENT)
Dept: FAMILY MEDICINE CLINIC | Age: 76
End: 2021-07-01

## 2021-07-01 DIAGNOSIS — Z79.899 CONTROLLED SUBSTANCE AGREEMENT SIGNED: ICD-10-CM

## 2021-07-01 DIAGNOSIS — N39.0 ACUTE URINARY TRACT INFECTION: Primary | ICD-10-CM

## 2021-07-01 DIAGNOSIS — M15.9 PRIMARY OSTEOARTHRITIS INVOLVING MULTIPLE JOINTS: ICD-10-CM

## 2021-07-01 RX ORDER — SULFAMETHOXAZOLE AND TRIMETHOPRIM 800; 160 MG/1; MG/1
1 TABLET ORAL 2 TIMES DAILY
Qty: 6 TABLET | Refills: 0 | Status: SHIPPED | OUTPATIENT
Start: 2021-07-01 | End: 2021-07-04

## 2021-07-02 RX ORDER — TRAMADOL HYDROCHLORIDE 50 MG/1
50 TABLET ORAL EVERY 8 HOURS PRN
Qty: 60 TABLET | Refills: 2 | Status: SHIPPED | OUTPATIENT
Start: 2021-07-02 | End: 2021-10-11 | Stop reason: SDUPTHER

## 2021-07-02 NOTE — TELEPHONE ENCOUNTER
ASSESSMENT & PLAN   Diagnosis Orders   1. Primary osteoarthritis involving multiple joints  traMADol (ULTRAM) 50 MG tablet   2. Controlled substance agreement signed; tramadol  traMADol (ULTRAM) 50 MG tablet       OAARS reviewed and appropriate. Controlled Substances Monitoring: Periodic Controlled Substance Monitoring: Possible medication side effects, risk of tolerance/dependence & alternative treatments discussed., No signs of potential drug abuse or diversion identified., Obtaining appropriate analgesic effect of treatment., Assessed functional status.  Mahgoany Lawson DO)      Future Appointments   Date Time Provider [facility]   8/12/2021  1:00 PM Renetta Zamora MD [facility] [place]   10/11/2021  2:00 PM Mahogany Lawson DO [address] [facility]

## 2021-07-08 DIAGNOSIS — M15.9 PRIMARY OSTEOARTHRITIS INVOLVING MULTIPLE JOINTS: ICD-10-CM

## 2021-07-08 DIAGNOSIS — Z79.899 CONTROLLED SUBSTANCE AGREEMENT SIGNED: ICD-10-CM

## 2021-07-08 RX ORDER — TRAMADOL HYDROCHLORIDE 50 MG/1
TABLET ORAL
Qty: 60 TABLET | Refills: 0 | OUTPATIENT
Start: 2021-07-08

## 2021-08-09 ENCOUNTER — NURSE ONLY (OUTPATIENT)
Dept: LAB | Age: 76
End: 2021-08-09

## 2021-08-09 DIAGNOSIS — E78.5 DYSLIPIDEMIA: ICD-10-CM

## 2021-08-09 DIAGNOSIS — Z79.899 CONTROLLED SUBSTANCE AGREEMENT SIGNED: ICD-10-CM

## 2021-08-09 DIAGNOSIS — R79.83 HOMOCYSTEINEMIA: ICD-10-CM

## 2021-08-09 DIAGNOSIS — M54.50 CHRONIC BILATERAL LOW BACK PAIN WITHOUT SCIATICA: ICD-10-CM

## 2021-08-09 DIAGNOSIS — R10.30 LOWER ABDOMINAL PAIN: ICD-10-CM

## 2021-08-09 DIAGNOSIS — I11.0 HYPERTENSIVE HEART DISEASE WITH CHRONIC SYSTOLIC CONGESTIVE HEART FAILURE (HCC): ICD-10-CM

## 2021-08-09 DIAGNOSIS — G89.29 CHRONIC BILATERAL LOW BACK PAIN WITHOUT SCIATICA: ICD-10-CM

## 2021-08-09 DIAGNOSIS — I25.10 CORONARY ARTERY DISEASE INVOLVING NATIVE CORONARY ARTERY OF NATIVE HEART WITHOUT ANGINA PECTORIS: ICD-10-CM

## 2021-08-09 DIAGNOSIS — I50.32 CHRONIC HEART FAILURE WITH PRESERVED EJECTION FRACTION (HCC): ICD-10-CM

## 2021-08-09 DIAGNOSIS — I10 ESSENTIAL HYPERTENSION: ICD-10-CM

## 2021-08-09 DIAGNOSIS — I50.22 HYPERTENSIVE HEART DISEASE WITH CHRONIC SYSTOLIC CONGESTIVE HEART FAILURE (HCC): ICD-10-CM

## 2021-08-09 DIAGNOSIS — N18.32 STAGE 3B CHRONIC KIDNEY DISEASE (HCC): ICD-10-CM

## 2021-08-09 DIAGNOSIS — M15.9 PRIMARY OSTEOARTHRITIS INVOLVING MULTIPLE JOINTS: ICD-10-CM

## 2021-08-09 DIAGNOSIS — M85.89 OSTEOPENIA OF MULTIPLE SITES: ICD-10-CM

## 2021-08-09 LAB
C-REACTIVE PROTEIN, HIGH SENSITIVITY: 1.6 MG/L
ESTRADIOL LEVEL: 13.4 PG/ML
MAGNESIUM: 2.7 MG/DL (ref 1.6–2.4)
PTH INTACT: 136.3 PG/ML (ref 15–65)
SEDIMENTATION RATE, ERYTHROCYTE: 39 MM/HR (ref 0–20)
VITAMIN D 25-HYDROXY: 46 NG/ML (ref 30–100)

## 2021-08-10 ENCOUNTER — TELEPHONE (OUTPATIENT)
Dept: FAMILY MEDICINE CLINIC | Age: 76
End: 2021-08-10

## 2021-08-10 LAB
DHEAS (DHEA SULFATE): 98.1 UG/DL (ref 10–90)
GLIADIN PEPTIDE IGG: < 0.4 U/ML
TISSUE TRANSGLUTAMINASE IGA: 0.4 U/ML

## 2021-08-10 NOTE — TELEPHONE ENCOUNTER
Called pt and lm advising she should stop her magnesium. Her level is just a bit high. But he would like her to stop it asap. Can discuss at her follow up on 8/11/2021.

## 2021-08-11 ENCOUNTER — NURSE ONLY (OUTPATIENT)
Dept: LAB | Age: 76
End: 2021-08-11

## 2021-08-11 ENCOUNTER — OFFICE VISIT (OUTPATIENT)
Dept: FAMILY MEDICINE CLINIC | Age: 76
End: 2021-08-11
Payer: MEDICARE

## 2021-08-11 VITALS
DIASTOLIC BLOOD PRESSURE: 58 MMHG | WEIGHT: 215 LBS | BODY MASS INDEX: 39.56 KG/M2 | OXYGEN SATURATION: 96 % | HEART RATE: 72 BPM | RESPIRATION RATE: 16 BRPM | HEIGHT: 62 IN | TEMPERATURE: 99 F | SYSTOLIC BLOOD PRESSURE: 118 MMHG

## 2021-08-11 DIAGNOSIS — M54.50 CHRONIC BILATERAL LOW BACK PAIN WITHOUT SCIATICA: ICD-10-CM

## 2021-08-11 DIAGNOSIS — K90.89 OTHER INTESTINAL MALABSORPTION: ICD-10-CM

## 2021-08-11 DIAGNOSIS — I11.0 HYPERTENSIVE HEART DISEASE WITH CHRONIC SYSTOLIC CONGESTIVE HEART FAILURE (HCC): ICD-10-CM

## 2021-08-11 DIAGNOSIS — M85.89 OSTEOPENIA OF MULTIPLE SITES: ICD-10-CM

## 2021-08-11 DIAGNOSIS — E66.01 SEVERE OBESITY (BMI 35.0-35.9 WITH COMORBIDITY) (HCC): ICD-10-CM

## 2021-08-11 DIAGNOSIS — R10.30 LOWER ABDOMINAL PAIN: ICD-10-CM

## 2021-08-11 DIAGNOSIS — M15.9 PRIMARY OSTEOARTHRITIS INVOLVING MULTIPLE JOINTS: Primary | ICD-10-CM

## 2021-08-11 DIAGNOSIS — G89.29 CHRONIC BILATERAL LOW BACK PAIN WITHOUT SCIATICA: ICD-10-CM

## 2021-08-11 DIAGNOSIS — M15.9 PRIMARY OSTEOARTHRITIS INVOLVING MULTIPLE JOINTS: ICD-10-CM

## 2021-08-11 DIAGNOSIS — E78.5 DYSLIPIDEMIA: ICD-10-CM

## 2021-08-11 DIAGNOSIS — E03.9 HYPOTHYROIDISM, UNSPECIFIED TYPE: ICD-10-CM

## 2021-08-11 DIAGNOSIS — N39.0 ACUTE URINARY TRACT INFECTION: ICD-10-CM

## 2021-08-11 DIAGNOSIS — I50.1 HEART FAILURE, LEFT, WITH LVEF <=30% (HCC): ICD-10-CM

## 2021-08-11 DIAGNOSIS — I10 ESSENTIAL HYPERTENSION: ICD-10-CM

## 2021-08-11 DIAGNOSIS — I50.22 HYPERTENSIVE HEART DISEASE WITH CHRONIC SYSTOLIC CONGESTIVE HEART FAILURE (HCC): ICD-10-CM

## 2021-08-11 DIAGNOSIS — Z79.899 CONTROLLED SUBSTANCE AGREEMENT SIGNED: ICD-10-CM

## 2021-08-11 DIAGNOSIS — I50.32 CHRONIC HEART FAILURE WITH PRESERVED EJECTION FRACTION (HCC): ICD-10-CM

## 2021-08-11 DIAGNOSIS — R79.83 HOMOCYSTEINEMIA: ICD-10-CM

## 2021-08-11 LAB
ANION GAP SERPL CALCULATED.3IONS-SCNC: 10 MEQ/L (ref 8–16)
BACTERIA: ABNORMAL /HPF
BILIRUBIN URINE: NEGATIVE
BLOOD, URINE: NEGATIVE
BUN BLDV-MCNC: 35 MG/DL (ref 7–22)
CALCIUM SERPL-MCNC: 9.6 MG/DL (ref 8.5–10.5)
CASTS 2: ABNORMAL /LPF
CASTS UA: ABNORMAL /LPF
CHARACTER, URINE: CLEAR
CHLORIDE BLD-SCNC: 102 MEQ/L (ref 98–111)
CO2: 28 MEQ/L (ref 23–33)
COLOR: ABNORMAL
CREAT SERPL-MCNC: 1.8 MG/DL (ref 0.4–1.2)
CRYSTALS, UA: ABNORMAL
EPITHELIAL CELLS, UA: ABNORMAL /HPF
GFR SERPL CREATININE-BSD FRML MDRD: 27 ML/MIN/1.73M2
GLUCOSE BLD-MCNC: 74 MG/DL (ref 70–108)
GLUCOSE URINE: NEGATIVE MG/DL
KETONES, URINE: NEGATIVE
LEUKOCYTE ESTERASE, URINE: ABNORMAL
MAGNESIUM: 2.7 MG/DL (ref 1.6–2.4)
MISCELLANEOUS 2: ABNORMAL
NITRITE, URINE: NEGATIVE
PH UA: 5.5 (ref 5–9)
POTASSIUM SERPL-SCNC: 4.1 MEQ/L (ref 3.5–5.2)
PROTEIN UA: NEGATIVE
RBC URINE: ABNORMAL /HPF
RENAL EPITHELIAL, UA: ABNORMAL
SODIUM BLD-SCNC: 140 MEQ/L (ref 135–145)
SPECIFIC GRAVITY, URINE: 1.01 (ref 1–1.03)
UROBILINOGEN, URINE: 0.2 EU/DL (ref 0–1)
VITAMIN D 25-HYDROXY: 45 NG/ML (ref 30–100)
WBC UA: ABNORMAL /HPF
YEAST: ABNORMAL

## 2021-08-11 PROCEDURE — 1036F TOBACCO NON-USER: CPT | Performed by: FAMILY MEDICINE

## 2021-08-11 PROCEDURE — 99214 OFFICE O/P EST MOD 30 MIN: CPT | Performed by: FAMILY MEDICINE

## 2021-08-11 PROCEDURE — G8427 DOCREV CUR MEDS BY ELIG CLIN: HCPCS | Performed by: FAMILY MEDICINE

## 2021-08-11 PROCEDURE — 1090F PRES/ABSN URINE INCON ASSESS: CPT | Performed by: FAMILY MEDICINE

## 2021-08-11 PROCEDURE — G8417 CALC BMI ABV UP PARAM F/U: HCPCS | Performed by: FAMILY MEDICINE

## 2021-08-11 PROCEDURE — 1123F ACP DISCUSS/DSCN MKR DOCD: CPT | Performed by: FAMILY MEDICINE

## 2021-08-11 PROCEDURE — G8399 PT W/DXA RESULTS DOCUMENT: HCPCS | Performed by: FAMILY MEDICINE

## 2021-08-11 PROCEDURE — 4040F PNEUMOC VAC/ADMIN/RCVD: CPT | Performed by: FAMILY MEDICINE

## 2021-08-11 SDOH — ECONOMIC STABILITY: FOOD INSECURITY: WITHIN THE PAST 12 MONTHS, THE FOOD YOU BOUGHT JUST DIDN'T LAST AND YOU DIDN'T HAVE MONEY TO GET MORE.: NEVER TRUE

## 2021-08-11 SDOH — ECONOMIC STABILITY: FOOD INSECURITY: WITHIN THE PAST 12 MONTHS, YOU WORRIED THAT YOUR FOOD WOULD RUN OUT BEFORE YOU GOT MONEY TO BUY MORE.: NEVER TRUE

## 2021-08-11 ASSESSMENT — SOCIAL DETERMINANTS OF HEALTH (SDOH): HOW HARD IS IT FOR YOU TO PAY FOR THE VERY BASICS LIKE FOOD, HOUSING, MEDICAL CARE, AND HEATING?: NOT HARD AT ALL

## 2021-08-11 NOTE — PROGRESS NOTES
[address] [facility] N[address] Walker [facility] [id number]  Dept: [phone]  Dept Fax: [phone]  Loc: [phone]      Tessa March is a 68 y.o. White female. Lavinia Sexton  presents to the [place] [address] clinic today for   Chief Complaint   Patient presents with    Follow-up     4 month follow up, WEE    Discuss Medications     magnesium,     Results     labs    Fatigue     can sleep all the time    Back Pain    Joint Pain    Headache    Bleeding/Bruising     mowing weed whipping   , and;   1. Primary osteoarthritis involving multiple joints    2. Controlled substance agreement signed    3. Acute urinary tract infection    4. Lower abdominal pain    5. Chronic heart failure with preserved ejection fraction (HCC)    6. Dyslipidemia    7. Chronic bilateral low back pain without sciatica    8. Homocysteinemia ([address]    9. Essential hypertension    10. Hypertensive heart disease with chronic systolic congestive heart failure ([address]    11. Osteopenia of multiple sites    12. Hypothyroidism, unspecified type    13. Heart failure, left, with LVEF <=30% ([address]    14. Other intestinal malabsorption    15. Severe obesity (BMI 35.0-35.9 with comorbidity) ([address]          I have reviewed Tessa March medical, surgical and other pertinent history in detail, and have updated medication and allergy information in the computerized patient record.      Clinical Care Team:     -Referring Provider for today's consult: self  -Primary Care Provider: Boubacar Carranza DO    Medical/Surgical History:   She  has a past medical history of Asthma, mild intermittent, CAD (coronary artery disease), Chronic low back pain, CKD (chronic kidney disease) stage 3, GFR 30-59 ml/min ([address] Controlled substance agreement signed; tramadol, Dyslipidemia, LYLA (generalized anxiety disorder), Heart failure with preserved ejection fraction ([address] History of gastroesophageal reflux (GERD), Homocysteinemia ([facility] Hypertension, Hypertensive heart disease with congestive heart failure ([facility] Left bundle branch block, Obesity (BMI 30-39.9), SUNI on CPAP, Osteoarthritis, Osteopenia, Seasonal allergies, and Vitamin D deficiency. Her  has a past surgical history that includes sinus surgery (1990); Rotator cuff repair (Bilateral, 2006  & 2009); Carpal tunnel release (08/2006); Rotator cuff repair (11/2009); Cardiac catheterization (10/30/14); eye surgery (Left, 04/23/2016); eye surgery (Right, 05/24/2006); Carpal tunnel release (Left, 2009); and Colonoscopy (2007). Family/Social History:     Her family history includes Cancer in her brother and father; Dementia in her brother; Diabetes in her maternal grandmother; Glaucoma in her brother; Heart Disease in her maternal grandmother and mother. She  reports that she is a non-smoker but has been exposed to tobacco smoke. She has never used smokeless tobacco. She reports that she does not drink alcohol and does not use drugs. Medications/Allergies/Immunizations:     Her current medication(s) include   Current Outpatient Medications:     traMADol (ULTRAM) 50 MG tablet, Take 1 tablet by mouth every 8 hours as needed for Pain for up to 90 days. , Disp: 60 tablet, Rfl: 2    sertraline (ZOLOFT) 50 MG tablet, Take 1 tablet by mouth once daily, Disp: 90 tablet, Rfl: 3    Cyanocobalamin (VITAMIN B 12 PO), Take by mouth daily, Disp: , Rfl:     ENTRESTO 49-51 MG per tablet, TAKE 1 TABLET BY MOUTH TWICE DAILY, Disp: , Rfl:     potassium chloride (KLOR-CON M) 10 MEQ extended release tablet, Take 1 tablet by mouth twice daily, Disp: 180 tablet, Rfl: 3    bumetanide (BUMEX) 2 MG tablet, Take 1 tablet by mouth once daily, Disp: 90 tablet, Rfl: 3    DHEA 10 MG TABS, Take 5 mg by mouth every morning (before breakfast) MCKENNA 5 mg tab at [email or web address], Disp: , Rfl:     acetaminophen (TYLENOL) 500 MG tablet, Take 500 mg by mouth nightly as needed for Pain, Disp: , Rfl:     Vitamins-Lipotropics (BALANCE B-100) TABS, Take 1 tablet by mouth 2 times daily (before meals) , Disp: , Rfl:     Omega 3 1000 MG CAPS, Take 1 capsule by mouth 4 times daily (before meals and nightly) (Patient taking differently: Take 1 capsule by mouth 4 times daily (before meals and nightly) [address] [facility]), Disp: 100 capsule, Rfl: 5    Cinnamon 500 MG CAPS, Take 500 mg by mouth 3 times daily , Disp: , Rfl:     fluticasone (FLONASE) 50 MCG/ACT nasal spray, 1 spray by Each Nare route daily as needed for Rhinitis, Disp: , Rfl:     carvedilol (COREG) 6.25 MG tablet, Take 1 tablet by mouth 2 times daily (with meals), Disp: 60 tablet, Rfl: 3    Cholecalciferol (VITAMIN D3) 2000 units CAPS, Take 1 capsule by mouth daily Double Wednesday and Sunday(2 days will work), Disp: , Rfl:     cetirizine (ZYRTEC) 10 MG tablet, Take 10 mg by mouth daily as needed for Allergies, Disp: , Rfl:     Lactobacillus-Inulin ([facility] DIGESTIVE [facility]) CAPS, Take 1 capsule by mouth daily , Disp: , Rfl:     Magnesium Oxide 500 MG CAPS, Take by mouth daily For more movements, Disp: , Rfl:     nitroGLYCERIN (NITROSTAT) 0.4 MG SL tablet, Place 0.4 mg under the tongue every 5 minutes as needed for Chest pain., Disp: , Rfl:     albuterol-ipratropium (COMBIVENT RESPIMAT)  MCG/ACT AERS inhaler, Inhale 1 puff into the lungs every 6 hours as needed for Wheezing., Disp: , Rfl:     aspirin 81 MG tablet, Take 81 mg by mouth daily. , Disp: , Rfl:     pravastatin (PRAVACHOL) 40 MG tablet, Take 40 mg by mouth daily. , Disp: , Rfl:     [id number]-[id number] PO, Place 1 tablet under the tongue every morning (before breakfast) MCKENNA B6, B12, Methylfolate at [employer] or [employer]. com (Patient not taking: Reported on 8/11/2021), Disp: , Rfl:   Allergies: Duricef [cefadroxil]  Immunizations:   Immunization History   Administered Date(s) Administered    COVID-19, Moderna, PF, 100mcg/0.5mL 03/02/2021, 04/01/2021    Influenza Vaccine, unspecified formulation 12/21/2016    Influenza Virus Vaccine 12/02/2015    Influenza, Quadv, adjuvanted, 65 yrs +, IM, PF (Fluad) 10/06/2020    Influenza, Triv, inactivated, subunit, adjuvanted, IM (Fluad 65 yrs and older) 11/29/2018, 12/16/2019    Pneumococcal Conjugate 13-valent ([id number]) 12/02/2015    Pneumococcal Polysaccharide ([id number]) 10/12/2011    Tdap (Boostrix, Adacel) 07/12/2015    Zoster Live (Zostavax) 10/13/2014        History of Present Illness:     Chelle had concerns including Follow-up (4 month follow up, WEE), Discuss Medications (magnesium, ), Results (labs), Fatigue (can sleep all the time), Back Pain, Joint Pain, Headache, and Bleeding/Bruising (mowing weed whipping). Joana Rutherford  presents to the [address] [place] [address] today for;   Chief Complaint   Patient presents with    Follow-up     4 month follow up, WEE    Discuss Medications     magnesium,     Results     labs    Fatigue     can sleep all the time    Back Pain    Joint Pain    Headache    Bleeding/Bruising     mowing weed whipping   , abnormal labs follow up and these conditions as she  Is looking today for:     1. Primary osteoarthritis involving multiple joints    2. Controlled substance agreement signed    3. Acute urinary tract infection    4. Lower abdominal pain    5. Chronic heart failure with preserved ejection fraction (HCC)    6. Dyslipidemia    7. Chronic bilateral low back pain without sciatica    8. Homocysteinemia ([address]    9. Essential hypertension    10. Hypertensive heart disease with chronic systolic congestive heart failure ([address]    11. Osteopenia of multiple sites    12. Hypothyroidism, unspecified type    13. Heart failure, left, with LVEF <=30% ([address]    14. Other intestinal malabsorption    15.  Severe obesity (BMI 35.0-35.9 with comorbidity) (HCC)      HPI    Subjective:     Review of Systems   All other systems reviewed and are negative. Objective:     BP (!) 118/58 (Site: Left Upper Arm, Position: Sitting, Cuff Size: Large Adult)   Pulse 72   Temp 99 °F (37.2 °C) (Oral)   Resp 16   Ht 5' 1.5\" (1.562 m)   Wt 215 lb (97.5 kg)   SpO2 96%   BMI 39.97 kg/m²   Physical Exam  Vitals and nursing note reviewed. Constitutional:       Appearance: Normal appearance. HENT:      Head: Normocephalic. Pulmonary:      Effort: Pulmonary effort is normal.   Neurological:      Mental Status: She is alert. Psychiatric:         Mood and Affect: Mood normal.         Thought Content: Thought content normal.            Laboratory Data:   Lab results were searched in Care Everywhere and/or those brought by the pateint were reviewed today with Erick Mata and she has a copy of their most recent labs to take home with them as noted below;       Imaging Data:   Imaging Data:       Assessment & Plan:       Impression:  1. Primary osteoarthritis involving multiple joints    2. Controlled substance agreement signed    3. Acute urinary tract infection    4. Lower abdominal pain    5. Chronic heart failure with preserved ejection fraction (HCC)    6. Dyslipidemia    7. Chronic bilateral low back pain without sciatica    8. Homocysteinemia ([address]    9. Essential hypertension    10. Hypertensive heart disease with chronic systolic congestive heart failure ([address]    11. Osteopenia of multiple sites    12. Hypothyroidism, unspecified type    13. Heart failure, left, with LVEF <=30% ([address]    14. Other intestinal malabsorption    15.  Severe obesity (BMI 35.0-35.9 with comorbidity) ([address]      Assessment and Plan:  After reviewing the patients chief complaints, reviewing their labfindings in great detail (with the patient and those accompanying them) which correlate to their chief complaints, symptoms, and or medical conditions; suggestions were made relating to changes in diet and or supplements which may improve the complaints and which will be reflected in their future lab findings; Chief Complaint   Patient presents with    Follow-up     4 month follow up, REGINO    Discuss Medications     magnesium,     Results     labs    Fatigue     can sleep all the time    Back Pain    Joint Pain    Headache    Bleeding/Bruising     mowing weed whipping   ;    Plans for the next visits:  - Abnormal and non-optimal Labs were ordered today to be repeated in the next 120-365 days to assess changes from adjustments in nutrition and or nutrients. - Patient instructed when having a blood draw to ask the  to divide their lab draws into multiple draws over several days if not feeling good at the time of the lab draw or if either prefers to do several smaller blood draws over several days  -Patient instructed to check with insurer before each lab draw and to go to the lab which the insurer directs them for the most cost effective lab draw with the least patient's cost  - Earnstine Dessert  will be scheduled subsequent to those results. Bernie Vitale will bring in her drink, food, supplement log to her next visit    Chronic Problems Addressed on this Visit:                                   1.  Intensity of Service; Uncontrolled items at this visit; Chief Complaint   Patient presents with    Follow-up     4 month follow up, REGINO    Discuss Medications     magnesium,     Results     labs    Fatigue     can sleep all the time    Back Pain    Joint Pain    Headache    Bleeding/Bruising     mowing weed whipping   ; Improved items at this visit and Stable items were discussed at this visit;  2.  Patients food, drinks, supplements and symptoms were reviewed with the patient,       - Earnstine Dessert will bring food, drink, supplements and symptoms log to next visit for inclusion in their record      - 75 better food list reviewed & given to patient with the omega 6 food list to avoid      - The 52 Latex foods list was reviewed and given to the patients with the information on long-term use and hepatotoxicity of cinnamon and other nutrients. I suggested they frequently search nih.gov for the latest non-proprietary information on nutriceuticals as well as consider a subscription to [employer] for details on reviewed supplements, or at the least review the nutrient files at [facility] at [facility], [address] bark, an insulin mimetic, reduces some High Carbohydrate Dietary Impacts. Methylhydroxychalcone polymers insulin-enhancing properties in fat cells are responsible for enhanced glucose uptake, inhibiting hepatic HMG-CoA reductase and lowers lipids. www.jacn. org/content/20/4/327.full     But cinnamon with additives such as Cinnamon Extract are not effective as insulin mimetics.  :eStoreDirectory.at     Nutrients for Start up from [employer] or [employer] for ease to get started now;  Gabriel Serna has some useable products;  - Triple Strength Fish Oil, enteric coated  - Vit D-3 5000 IU gel caps  - Iron ferrous sulfate 325 mg tabs  - Centrum Silver look-a-like for most patients, or  - Centrum plain look-a-like if need iron    Local pharmacies or chains such as "[employer] have:  - [employer] pharmaceutical grade omega 3 is 90% EPA/DHA whereas most Triple strength fish oil are 75% EPA/DHA  - Triple Strength Fish Oil (enteric coated if available) or if not enteric coated, can take from freezer for less burps  - B-50 or B-100 released balanced B complex tabs by [address] [facility] at [facility] bark 500 mg (without Chromium or extracts)   some brands list 1000 mg / serving of 2 capsules,    some brands have 1000 mg caps with the undesireable chromium extract  - Calcium carbonate/citrate, magnesium oxide/citrate, Vit D-3 as 3-4 tabs/caps/serving     Some Local Brands may contain Zinc which is acceptable for the first bottle or two  - Magnesium oxide 250 mg tabs for those having < 2 bowel movements daily  - Magnesium citrate 200 mg if having > 2 bowel movements/day  - Centrum Silver or look-a-like for most patients, Centrum plain or look-a-like with iron  - Vitamin D-3 comes as 1,000 IU or 2,000 IU or 5,000 IU gel caps or Liquid drops but keep Vitamin D levels <50 but >40     Some brands containing or derived from soy oil or corn oil are OK if not allergic to soy  - Elemental Iron 65 mg tabs at bedtime is available over the counter if need more iron     Usually turns bowel movements grey, green, or black but not a concern  - Apricot Kernel Oil (by [employer]) for dry skin sensitive perineal or perianal area skin    Nutrients for ongoing use by Mail order for less expense from wwwN-Sided ;  - Strength Fish Oil , 240 Softgels Item #557818  -B-100 time released balanced B complex Item #044677  - Cinnamon bark 500 mg without Chromium or extract Item #447588  - Calcium carbonate 1000 mg, Magnesium oxide 500 mg, Vit D-3 400 IU Item #371079  - Magnesium oxide 500 mg tabs Item #804502 if less than 2 bowel movements daily  - [employer] Item #921876 for most patients, One Daily Item #397194 with iron  - Vit D 3  1,000 Item #391787      2,000 IU Item #952753   Item #434360     Some brands containing orderived from soy oil or corn oil are OK if not allergic to soy    Nutrients for Special Needs by Mail order for less expense from www. puritan.com;  -Elemental Iron 65 mg tabs Item #849619 if need more iron for low iron on labs    Usually turns bowel movements grey, green or black but not a concern  - Time released Niacin 250 mg Item #865301 for cold intolerance, low libido or impotence  - DHEA 50 mg Item #798538 for improving DHEA levels on labs if having Fatigue    If stools too loose substitute for your Magnesium oxide using;   Magnesium citrate 200 mg tabs (NOT liquid) at [employer]   Magnesium gluconate 550 mg by Eduard Barrientos at [employer] or [employer]. com  Magnesium chloride foot soaks or body sprays  www.HapBoosBRAIN the best flavor, cost, and less Latex. Locally grown produce not only tastes great but also requires little or no ethylene exposure in food distribution so has less latex content. Out of season: use canned, frozen, or dried since those are processed ripe and latex content is lower!!!     Month     [place] Locally Grown Produce  January, February, March: use canned, frozen or dried fruits since lower in latex  April: asparagus, radishes  May: asparagus, broccoli, green onions, greens, peas, radishes, rhubarb  Luli: asparagus, beets, beans, broccoli, cabbage, cantaloupe, carrots, green onions, greens, lettuce, onions, parsley, peas, radishes, rhubarb, strawberries, watermelons  July: beans, beets, blueberries, broccoli, cabbage, cantaloupe, carrots, cauliflower, celery, cucumbers, eggplant, grapes, green onions, greens, lettuce, onions, parsley, peas, peaches, bell peppers, potatoes, radishes, summer raspberries, squash, sweetcorn, tomatoes, turnips, watermelons  August: apples, beans, beets, blueberries, cabbage, cantaloupe, carrots, cauliflower, celery, cucumbers, eggplant, grapes, green onions, greens, lettuce, onions, parsley, peas, peaches, pears, bell peppers, potatoes, radishes, squash, sweet corn, tomatoes, turnips, watermelons  September: apples, beans, beets, blueberries, cabbage, cantaloupe, carrots, cauliflower, celery, cucumbers, eggplant, grapes, green onions, greens, lettuce, onions, parsley, peas, peaches, pears, bell peppers, plums, potatoes, pumpkins, radishes, fall red raspberries, squash, sweet corn, tomatoes, turnips, watermelons  October: apples, beets, broccoli, cabbage, carrots, cauliflower, celery, green onions, greens, lettuce, parsley, peas, pears, potatoes, pumpkins, radishes, fall red raspberries, squash, turnips  November: broccoli, cabbage, carrots, parsley, pears, peas  December: use canned, frozen or dried fruits since lower in latex    Upto half of latex-sensitive patients show allergic food shop   Management of Latex, :[email or web address]  search for nih, latex-like proteins in foods

## 2021-08-12 LAB
DHEA UNCONJUGATED: 2.38 NG/ML (ref 0.63–4.7)
TESTOSTERONE, FREE W SHGB, FEMALES/CHILDREN: NORMAL

## 2021-10-10 NOTE — PROGRESS NOTES
Chief Complaint   Patient presents with    Follow-up     6 month as noted below       History obtained from the patient. SUBJECTIVE:  Sandra Santos is a 68 y.o. female that presents today for     -CHF PRIOR VISIT:   Patient admitted to [facility] from 5/17 to 5/22 for new onset HF. D/c summary:   None at time of my evalation    Last cardio PN:  Plan:   Patient with non ischemic chf   continue medical rx    will need life vest placement    ambulate    will see at the office few weeks       Since discharge has done well. No SOB, orthopnea or pND. Has life vest. Has not done off. Tolerating meds. Has f/u with cardioJose, next wk. Doing daily wts, stable. UPDATE PRIOR VISIT:   Overall doing better  Following with Jose  No CP/SOb  Pt told EF better, records pending, and off life vest.   Still having occ palp    UPDATE TODAY:   Repeat EF 55+%  No CP/sOB  On Entresto yet as well as BB and statin  Seeing Jose   Has f/u scheduled  wts steady  Feeling well      -HTN/CKD3:    HPI:     Taking meds as prescribed ?: yes  Tolerating well ?: yes  Side Effects ?: denies  BP at home ?: <140/90  Working on TLCS ?: yes  Chest Pain/SOB/Palpitations? denies    BP Readings from Last 3 Encounters:   10/11/21 112/70   08/11/21 (!) 118/58   06/28/21 122/60       -Homocystine PRIOr VISIT: noted on labs a few years ago. Not treated as far as she knows. Las labs 2016. UPDATE TODAY:   on folate   Has f/u labs with Jacqueline MartinezHLD:    HPI:  Will get labs via cardio    Taking meds as prescribed ?: yes  Tolerating well ?: yes  Side Effects ?: denies  Muscle Pain?: denies  Working on TLCS ?: yes      -Chronic pain: has back issues and OA: uses tramadol. 60 tabs per 30 days. Was getting from sharifa Vega PCP. Had asked me to take over. OAARS reviewed and appropriate. UTD on refills.         -LYLA: on zoloft, works well. No sI/HI.         -Asthma: On prn combivent. Works well. Uses once per month.  PFT 2017 with min Christen  [facility]: Chai Givens      Current Outpatient Medications   Medication Sig Dispense Refill    traMADol (ULTRAM) 50 MG tablet Take 1 tablet by mouth every 8 hours as needed for Pain for up to 90 days. 60 tablet 2    sertraline (ZOLOFT) 50 MG tablet Take 1 tablet by mouth once daily 90 tablet 3    Cyanocobalamin (VITAMIN B 12 PO) Take by mouth daily      ENTRESTO 49-51 MG per tablet TAKE 1 TABLET BY MOUTH TWICE DAILY      potassium chloride (KLOR-CON M) 10 MEQ extended release tablet Take 1 tablet by mouth twice daily 180 tablet 3    bumetanide (BUMEX) 2 MG tablet Take 1 tablet by mouth once daily 90 tablet 3    OJLVAGEMC-TKMYCHSOL-FEWUDFACZZ PO Place 1 tablet under the tongue every morning (before breakfast) MCKENNA B6, B12, Methylfolate at [employer] or [employer]. [employer]       DHEA 10 MG TABS Take 5 mg by mouth every morning (before breakfast) MCKENNA 5 mg tab at [employer]      acetaminophen (TYLENOL) 500 MG tablet Take 500 mg by mouth nightly as needed for Pain      Vitamins-Lipotropics (BALANCE B-100) TABS Take 1 tablet by mouth 2 times daily (before meals)       Omega 3 1000 MG CAPS Take 1 capsule by mouth 4 times daily (before meals and nightly) (Patient taking differently: Take 1 capsule by mouth 4 times daily (before meals and nightly) [id number] [facility]) 100 capsule 5    Cinnamon 500 MG CAPS Take 500 mg by mouth 3 times daily       fluticasone (FLONASE) 50 MCG/ACT nasal spray 1 spray by Each Nare route daily as needed for Rhinitis      carvedilol (COREG) 6.25 MG tablet Take 1 tablet by mouth 2 times daily (with meals) 60 tablet 3    Cholecalciferol (VITAMIN D3) 2000 units CAPS Take 1 capsule by mouth daily Double Wednesday and Sunday(2 days will work)     Ev Nica cetirizine (ZYRTEC) 10 MG tablet Take 10 mg by mouth daily as needed for Allergies      Lactobacillus-Inulin ([facility] DIGESTIVE [facility]) CAPS Take 1 capsule by mouth daily       Magnesium Oxide 500 MG CAPS Take by mouth daily For more movements      nitroGLYCERIN (NITROSTAT) 0.4 MG SL tablet Place 0.4 mg under the tongue every 5 minutes as needed for Chest pain.  albuterol-ipratropium (COMBIVENT RESPIMAT)  MCG/ACT AERS inhaler Inhale 1 puff into the lungs every 6 hours as needed for Wheezing.  aspirin 81 MG tablet Take 81 mg by mouth daily.  pravastatin (PRAVACHOL) 40 MG tablet Take 40 mg by mouth daily. No current facility-administered medications for this visit. Orders Placed This Encounter   Medications    traMADol (ULTRAM) 50 MG tablet     Sig: Take 1 tablet by mouth every 8 hours as needed for Pain for up to 90 days. Dispense:  60 tablet     Refill:  2     Reduce doses taken as pain becomes manageable         All medications reviewed and reconciled, including OTC and herbal medications. Updated list given to patient. Patient Active Problem List    Diagnosis Date Noted    Tonsillar mass 10/06/2020    CKD (chronic kidney disease) stage 3, GFR 30-59 ml/min ([facility])     Heart failure with preserved ejection fraction ([facility] 05/20/2019     Previously <30%, recovered.  Hypertensive heart disease with congestive heart failure ([facility] 05/17/2019    Controlled substance agreement signed; tramadol 03/27/2019    Asthma, mild intermittent     History of gastroesophageal reflux (GERD)     SUNI on CPAP      wears CPAP nightly      Osteopenia     Seasonal allergies     Osteoarthritis     CAD (coronary artery disease)      mild disease in small vessels per cath 2014. Follows with Jose      Left bundle branch block     Hypertension     Dyslipidemia     Obesity (BMI 30-39. 9)     Vitamin D deficiency 05/24/2016    Homocysteinemia 04/18/2016    Chronic low back pain     LYLA (generalized anxiety disorder)        Past Medical History:   Diagnosis Date    Asthma, mild intermittent     CAD (coronary artery disease)     mild disease in small vessels per cath 2014.  Follows with Jose    Chronic low back pain     CKD (chronic kidney disease) stage 3, GFR 30-59 ml/min ([facility])     Controlled substance agreement signed; tramadol 03/27/2019    Dyslipidemia     LYLA (generalized anxiety disorder)     [facility] 05/20/2019    Previously <30%, recovered.  History of gastroesophageal reflux (GERD)     no longer on medication    Homocysteinemia 04/18/2016    Hypertension     Hypertensive heart disease with congestive heart failure [facility] 05/17/2019    Left bundle branch block     Obesity (BMI 30-39. 9)     SUNI on CPAP     wears CPAP nightly    Osteoarthritis     Osteopenia     Seasonal allergies     Vitamin D deficiency 05/24/2016         Past Surgical History:   Procedure Laterality Date    CARDIAC CATHETERIZATION  10/30/14    Dr. Fernando Hurtado  08/2006    Louie Malady Left 2009    COLONOSCOPY  2007    EYE SURGERY Left 04/23/2016    Dr. Amie Benjamin Right 05/24/2006    Dr. Marly Arteaga Bilateral 2006  & 2009    ROTATOR CUFF REPAIR  11/2009    SINUS SURGERY  1990         Allergies   Allergen Reactions    Duricef [Cefadroxil]          Social History     Tobacco Use    Smoking status: Passive Smoke Exposure - Never Smoker    Smokeless tobacco: Never Used   Substance Use Topics    Alcohol use: No         Family History   Problem Relation Age of Onset    Heart Disease Mother     Cancer Father     Cancer Brother     Dementia Brother     Diabetes Maternal Grandmother     Heart Disease Maternal Grandmother     Glaucoma Brother          I have reviewed the patient's past medical history, past surgical history, allergies, medications, social and family history and I have made updates where appropriate.       Review of Systems  Positive responses are highlighted in bold    Constitutional:  Fever, Chills, Night Sweats, Fatigue, Unexpected changes in weight  HENT:  Ear pain, Tinnitus, Nosebleeds, Trouble swallowing, Hearing loss, Sore throat  Cardiovascular:  Chest Pain, Palpitations, Orthopnea, Paroxysmal Nocturnal Dyspnea  Respiratory:  Cough, Wheezing, Shortness of breath, Chest tightness, Apnea  Gastrointestinal:  Nausea, Vomiting, Diarrhea, Constipation, Heartburn, Blood in stool  Genitourinary:  Difficulty or painful urination, Flank pain, Change in frequency, Urgency  Skin:  Color change, Rash, Itching, Wound  Musculoskeletal:  Joint pain, Back pain, Gait problems, Joint swelling, Myalgias  Neurological:  Dizziness, Headaches, Presyncope, Numbness, Seizures, Tremors  Endocrine:  Heat Intolerance, Cold Intolerance, Polydipsia, Polyphagia, Polyuria      PHYSICAL EXAM:  Vitals:    10/11/21 1413   BP: 112/70   Pulse: 90   Resp: 14   Temp: 98 °F (36.7 °C)   TempSrc: Oral   SpO2: 95%   Weight: 216 lb (98 kg)   Height: 5' 2\" (1.575 m)     Body mass index is 39.51 kg/m². Pain Score:   3 (back/joints. Chronic)    VS Reviewed  General Appearance: A&O x 3, No acute distress,well developed and well- nourished  Eyes: pupils equal, round, and reactive to light, extraocular eye movements intact, conjunctivae and eye lids without erythema  ENT: external ear and ear canal clear bilaterally, TMs intact and regular, nose without deformity, nasal mucosa and turbinates normal without polyps, oropharynx normal, dentition is normal for age. Neck: supple and non-tender without mass, no thyromegaly or thyroid nodules, no cervical lymphadenopathy  Pulmonary/Chest: clear to auscultation bilaterally- no wheezes, rales or rhonchi, normal air movement, no respiratory distress or retractions  Cardiovascular: S1 and S2 auscultated w/ RRR. No murmurs, rubs, clicks, or gallops, distal pulses intact. Abdomen: soft, non-tender, non-distended, bowel sounds physiologic,  no rebound or guarding, no masses or hernias noted. Liver and spleen without enlargement. Extremities: no cyanosis, clubbing or edema of the lower extremities.  +2 PT/DP bilaterally. Musculoskeletal: No joint swelling or gross deformity   Skin: warm and dry, no rash or erythema      ASSESSMENT & PLAN  1. Chronic heart failure with preserved ejection fraction (HCC)    Doing well, EF improved  con't current meds  On BB, entresto and pravachol  Daily wts  bumex  F/u CardioJose    2. Hypertensive heart disease with chronic systolic congestive heart failure ([facility]    As above    3. Coronary artery disease involving native coronary artery of native heart without angina pectoris    As above  Stable  con't tertiary prevention    4. Primary hypertension    Stable  con't Entrestro and coreg    5. Stage 3b chronic kidney disease ([facility]    As per # 4    6. Homocysteinemia    con't folate  F/u integrative med    7. Dyslipidemia    Stable  con't pravachol  Will have labs via cardio    8. Chronic bilateral low back pain without sciatica    Stable  Tramadol works well  Does 60 tabs per 30 days  Iveth  is appropriate  Controlled substance agreement signed  May call for RF when needed    OAARS reviewed and appropriate. Controlled Substances Monitoring:     Periodic Controlled Substance Monitoring: Possible medication side effects, risk of tolerance/dependence & alternative treatments discussed., No signs of potential drug abuse or diversion identified. , Assessed functional status., Obtaining appropriate analgesic effect of treatment. Debi Portillo DO)    - traMADol (ULTRAM) 50 MG tablet; Take 1 tablet by mouth every 8 hours as needed for Pain for up to 90 days. Dispense: 60 tablet; Refill: 2    9. Primary osteoarthritis involving multiple joints    As above    - traMADol (ULTRAM) 50 MG tablet; Take 1 tablet by mouth every 8 hours as needed for Pain for up to 90 days. Dispense: 60 tablet; Refill: 2    10. Controlled substance agreement signed    As above    - traMADol (ULTRAM) 50 MG tablet; Take 1 tablet by mouth every 8 hours as needed for Pain for up to 90 days.   Dispense: 60 tablet; Refill: 2    11. LYLA (generalized anxiety disorder)    Stable  Doing well  con't zolof    12. Mild intermittent asthma without complication    Stable  con't prn combivent  PFT UTD    13. Osteopenia of multiple sites    Stable  con't lashell with D  DEXA ordered    - DEXA BONE DENSITY AXIAL SKELETON; Future    14. Post-menopause    - DEXA BONE DENSITY AXIAL SKELETON; Future    15. Needs flu shot    - INFLUENZA, QUADV, ADJUVANTED, 65 YRS =, IM, PF, PREFILL SYR, 0.5ML (FLUAD)      DISPOSITION    Return in about 6 months (around 4/11/2022) for AWV, follow-up on chronic medical conditions, sooner as needed. Asha Latin released without restrictions. Future Appointments   Date Time Provider Harmony Dos Santos   11/11/2021  1:00 PM [facility] [place]   4/11/2022  1:00 PM Saint Goodpasture, DO [address] [facility]     PATIENT COUNSELING    Barriers to learning and self management: none    Discussed use, benefit, and side effects of prescribed medications. Barriers to medication compliance addressed. All patient questions answered. Pt voiced understanding.        Electronically signed by Saint Goodpasture, DO on 10/11/2021 at 2:42 PM

## 2021-10-11 ENCOUNTER — OFFICE VISIT (OUTPATIENT)
Dept: FAMILY MEDICINE CLINIC | Age: 76
End: 2021-10-11
Payer: MEDICARE

## 2021-10-11 VITALS
TEMPERATURE: 98 F | HEART RATE: 90 BPM | RESPIRATION RATE: 14 BRPM | OXYGEN SATURATION: 95 % | HEIGHT: 62 IN | DIASTOLIC BLOOD PRESSURE: 70 MMHG | WEIGHT: 216 LBS | BODY MASS INDEX: 39.75 KG/M2 | SYSTOLIC BLOOD PRESSURE: 112 MMHG

## 2021-10-11 DIAGNOSIS — N18.32 STAGE 3B CHRONIC KIDNEY DISEASE (HCC): ICD-10-CM

## 2021-10-11 DIAGNOSIS — E78.5 DYSLIPIDEMIA: ICD-10-CM

## 2021-10-11 DIAGNOSIS — Z79.899 CONTROLLED SUBSTANCE AGREEMENT SIGNED: ICD-10-CM

## 2021-10-11 DIAGNOSIS — F41.1 GAD (GENERALIZED ANXIETY DISORDER): ICD-10-CM

## 2021-10-11 DIAGNOSIS — Z78.0 POST-MENOPAUSE: ICD-10-CM

## 2021-10-11 DIAGNOSIS — I25.10 CORONARY ARTERY DISEASE INVOLVING NATIVE CORONARY ARTERY OF NATIVE HEART WITHOUT ANGINA PECTORIS: ICD-10-CM

## 2021-10-11 DIAGNOSIS — I11.0 HYPERTENSIVE HEART DISEASE WITH CHRONIC SYSTOLIC CONGESTIVE HEART FAILURE (HCC): ICD-10-CM

## 2021-10-11 DIAGNOSIS — Z23 NEEDS FLU SHOT: ICD-10-CM

## 2021-10-11 DIAGNOSIS — M15.9 PRIMARY OSTEOARTHRITIS INVOLVING MULTIPLE JOINTS: ICD-10-CM

## 2021-10-11 DIAGNOSIS — R79.83 HOMOCYSTEINEMIA: ICD-10-CM

## 2021-10-11 DIAGNOSIS — M85.89 OSTEOPENIA OF MULTIPLE SITES: ICD-10-CM

## 2021-10-11 DIAGNOSIS — I50.32 CHRONIC HEART FAILURE WITH PRESERVED EJECTION FRACTION (HCC): Primary | ICD-10-CM

## 2021-10-11 DIAGNOSIS — J45.20 MILD INTERMITTENT ASTHMA WITHOUT COMPLICATION: ICD-10-CM

## 2021-10-11 DIAGNOSIS — I50.22 HYPERTENSIVE HEART DISEASE WITH CHRONIC SYSTOLIC CONGESTIVE HEART FAILURE (HCC): ICD-10-CM

## 2021-10-11 DIAGNOSIS — I10 PRIMARY HYPERTENSION: Chronic | ICD-10-CM

## 2021-10-11 DIAGNOSIS — M54.50 CHRONIC BILATERAL LOW BACK PAIN WITHOUT SCIATICA: ICD-10-CM

## 2021-10-11 DIAGNOSIS — G89.29 CHRONIC BILATERAL LOW BACK PAIN WITHOUT SCIATICA: ICD-10-CM

## 2021-10-11 PROCEDURE — 4040F PNEUMOC VAC/ADMIN/RCVD: CPT | Performed by: FAMILY MEDICINE

## 2021-10-11 PROCEDURE — G8427 DOCREV CUR MEDS BY ELIG CLIN: HCPCS | Performed by: FAMILY MEDICINE

## 2021-10-11 PROCEDURE — 99214 OFFICE O/P EST MOD 30 MIN: CPT | Performed by: FAMILY MEDICINE

## 2021-10-11 PROCEDURE — 1123F ACP DISCUSS/DSCN MKR DOCD: CPT | Performed by: FAMILY MEDICINE

## 2021-10-11 PROCEDURE — G8399 PT W/DXA RESULTS DOCUMENT: HCPCS | Performed by: FAMILY MEDICINE

## 2021-10-11 PROCEDURE — G8484 FLU IMMUNIZE NO ADMIN: HCPCS | Performed by: FAMILY MEDICINE

## 2021-10-11 PROCEDURE — G0008 ADMIN INFLUENZA VIRUS VAC: HCPCS | Performed by: FAMILY MEDICINE

## 2021-10-11 PROCEDURE — 1090F PRES/ABSN URINE INCON ASSESS: CPT | Performed by: FAMILY MEDICINE

## 2021-10-11 PROCEDURE — G8417 CALC BMI ABV UP PARAM F/U: HCPCS | Performed by: FAMILY MEDICINE

## 2021-10-11 PROCEDURE — 1036F TOBACCO NON-USER: CPT | Performed by: FAMILY MEDICINE

## 2021-10-11 PROCEDURE — 90694 VACC AIIV4 NO PRSRV 0.5ML IM: CPT | Performed by: FAMILY MEDICINE

## 2021-10-11 RX ORDER — TRAMADOL HYDROCHLORIDE 50 MG/1
TABLET ORAL
Qty: 60 TABLET | Refills: 0 | OUTPATIENT
Start: 2021-10-11

## 2021-10-11 RX ORDER — TRAMADOL HYDROCHLORIDE 50 MG/1
50 TABLET ORAL EVERY 8 HOURS PRN
Qty: 60 TABLET | Refills: 2 | Status: SHIPPED | OUTPATIENT
Start: 2021-10-11 | End: 2022-01-14

## 2021-10-11 NOTE — PROGRESS NOTES
Vaccine Information Sheet, \"Influenza - Inactivated\"  given to Sandra Santos, or parent/legal guardian of  Sandra Santos and verbalized understanding. Patient responses:    Have you ever had a reaction to a flu vaccine? Yes  Do you have an allergy to eggs, neomycin or polymixin? No  Do you have an allergy to Thimerosal, contact lens solution, or Merthiolate? No  Have you ever had Guillian [place] Syndrome? No  Do you have any current illness? No  Do you have a temperature above 100 degrees? No  Are you pregnant? No  If pregnant, permission obtained from physician? No  Do you have an active neurological disorder? No      Flu vaccine given per order. Please see immunization tab.

## 2021-10-15 ENCOUNTER — PATIENT MESSAGE (OUTPATIENT)
Dept: FAMILY MEDICINE CLINIC | Age: 76
End: 2021-10-15

## 2021-10-15 DIAGNOSIS — I10 PRIMARY HYPERTENSION: ICD-10-CM

## 2021-10-15 DIAGNOSIS — R10.30 LOWER ABDOMINAL PAIN: ICD-10-CM

## 2021-10-15 DIAGNOSIS — R79.83 HOMOCYSTEINEMIA: ICD-10-CM

## 2021-10-15 DIAGNOSIS — I10 ESSENTIAL HYPERTENSION: ICD-10-CM

## 2021-10-15 DIAGNOSIS — J45.20 MILD INTERMITTENT ASTHMA WITHOUT COMPLICATION: ICD-10-CM

## 2021-10-15 DIAGNOSIS — R73.09 LOW GLUCOSE LEVEL: ICD-10-CM

## 2021-10-15 DIAGNOSIS — E03.9 HYPOTHYROIDISM, UNSPECIFIED TYPE: ICD-10-CM

## 2021-10-15 DIAGNOSIS — K90.89 OTHER INTESTINAL MALABSORPTION: ICD-10-CM

## 2021-10-15 DIAGNOSIS — I11.0 HYPERTENSIVE HEART DISEASE WITH CHRONIC SYSTOLIC CONGESTIVE HEART FAILURE (HCC): ICD-10-CM

## 2021-10-15 DIAGNOSIS — E78.5 DYSLIPIDEMIA: ICD-10-CM

## 2021-10-15 DIAGNOSIS — M15.9 PRIMARY OSTEOARTHRITIS INVOLVING MULTIPLE JOINTS: Primary | ICD-10-CM

## 2021-10-15 DIAGNOSIS — I50.32 CHRONIC HEART FAILURE WITH PRESERVED EJECTION FRACTION (HCC): ICD-10-CM

## 2021-10-15 DIAGNOSIS — N18.32 STAGE 3B CHRONIC KIDNEY DISEASE (HCC): ICD-10-CM

## 2021-10-15 DIAGNOSIS — I50.22 HYPERTENSIVE HEART DISEASE WITH CHRONIC SYSTOLIC CONGESTIVE HEART FAILURE (HCC): ICD-10-CM

## 2021-10-18 NOTE — TELEPHONE ENCOUNTER
Dr Bette Díaz Please advise if pt needs to have labs done again closer to her appointment date and time.

## 2021-10-27 ENCOUNTER — NURSE ONLY (OUTPATIENT)
Dept: LAB | Age: 76
End: 2021-10-27

## 2021-10-27 DIAGNOSIS — I10 ESSENTIAL HYPERTENSION: ICD-10-CM

## 2021-10-27 DIAGNOSIS — I11.0 HYPERTENSIVE HEART DISEASE WITH CHRONIC SYSTOLIC CONGESTIVE HEART FAILURE (HCC): ICD-10-CM

## 2021-10-27 DIAGNOSIS — N18.32 STAGE 3B CHRONIC KIDNEY DISEASE (HCC): ICD-10-CM

## 2021-10-27 DIAGNOSIS — J45.20 MILD INTERMITTENT ASTHMA WITHOUT COMPLICATION: ICD-10-CM

## 2021-10-27 DIAGNOSIS — I50.32 CHRONIC HEART FAILURE WITH PRESERVED EJECTION FRACTION (HCC): ICD-10-CM

## 2021-10-27 DIAGNOSIS — E03.9 HYPOTHYROIDISM, UNSPECIFIED TYPE: ICD-10-CM

## 2021-10-27 DIAGNOSIS — E78.5 DYSLIPIDEMIA: ICD-10-CM

## 2021-10-27 DIAGNOSIS — M15.9 PRIMARY OSTEOARTHRITIS INVOLVING MULTIPLE JOINTS: ICD-10-CM

## 2021-10-27 DIAGNOSIS — I10 PRIMARY HYPERTENSION: ICD-10-CM

## 2021-10-27 DIAGNOSIS — R79.83 HOMOCYSTEINEMIA: ICD-10-CM

## 2021-10-27 DIAGNOSIS — I50.22 HYPERTENSIVE HEART DISEASE WITH CHRONIC SYSTOLIC CONGESTIVE HEART FAILURE (HCC): ICD-10-CM

## 2021-10-27 DIAGNOSIS — R73.09 LOW GLUCOSE LEVEL: ICD-10-CM

## 2021-10-27 DIAGNOSIS — K90.89 OTHER INTESTINAL MALABSORPTION: ICD-10-CM

## 2021-10-27 DIAGNOSIS — R10.30 LOWER ABDOMINAL PAIN: ICD-10-CM

## 2021-10-27 LAB
ALBUMIN SERPL-MCNC: 4.6 G/DL (ref 3.5–5.1)
ALP BLD-CCNC: 80 U/L (ref 38–126)
ALT SERPL-CCNC: 13 U/L (ref 11–66)
ANION GAP SERPL CALCULATED.3IONS-SCNC: 14 MEQ/L (ref 8–16)
AST SERPL-CCNC: 23 U/L (ref 5–40)
AVERAGE GLUCOSE: 120 MG/DL (ref 70–126)
BASOPHILS # BLD: 1.3 %
BASOPHILS ABSOLUTE: 0.1 THOU/MM3 (ref 0–0.1)
BILIRUB SERPL-MCNC: 1 MG/DL (ref 0.3–1.2)
BILIRUBIN DIRECT: < 0.2 MG/DL (ref 0–0.3)
BUN BLDV-MCNC: 36 MG/DL (ref 7–22)
CALCIUM SERPL-MCNC: 9.7 MG/DL (ref 8.5–10.5)
CHLORIDE BLD-SCNC: 103 MEQ/L (ref 98–111)
CHOLESTEROL, TOTAL: 181 MG/DL (ref 100–199)
CO2: 23 MEQ/L (ref 23–33)
CREAT SERPL-MCNC: 1.8 MG/DL (ref 0.4–1.2)
EOSINOPHIL # BLD: 5.2 %
EOSINOPHILS ABSOLUTE: 0.3 THOU/MM3 (ref 0–0.4)
ERYTHROCYTE [DISTWIDTH] IN BLOOD BY AUTOMATED COUNT: 12.9 % (ref 11.5–14.5)
ERYTHROCYTE [DISTWIDTH] IN BLOOD BY AUTOMATED COUNT: 45.9 FL (ref 35–45)
ESTRADIOL LEVEL: 9.7 PG/ML
GFR SERPL CREATININE-BSD FRML MDRD: 27 ML/MIN/1.73M2
GLUCOSE BLD-MCNC: 90 MG/DL (ref 70–108)
HBA1C MFR BLD: 6 % (ref 4.4–6.4)
HCT VFR BLD CALC: 37 % (ref 37–47)
HDLC SERPL-MCNC: 77 MG/DL
HEMOGLOBIN: 11.8 GM/DL (ref 12–16)
IMMATURE GRANS (ABS): 0 THOU/MM3 (ref 0–0.07)
IMMATURE GRANULOCYTES: 0 %
LDL CHOLESTEROL CALCULATED: 83 MG/DL
LYMPHOCYTES # BLD: 39.3 %
LYMPHOCYTES ABSOLUTE: 2 THOU/MM3 (ref 1–4.8)
MAGNESIUM: 2.5 MG/DL (ref 1.6–2.4)
MCH RBC QN AUTO: 31.1 PG (ref 26–33)
MCHC RBC AUTO-ENTMCNC: 31.9 GM/DL (ref 32.2–35.5)
MCV RBC AUTO: 97.4 FL (ref 81–99)
MONOCYTES # BLD: 7.3 %
MONOCYTES ABSOLUTE: 0.4 THOU/MM3 (ref 0.4–1.3)
NUCLEATED RED BLOOD CELLS: 0 /100 WBC
PLATELET # BLD: 205 THOU/MM3 (ref 130–400)
PMV BLD AUTO: 10.4 FL (ref 9.4–12.4)
POTASSIUM SERPL-SCNC: 4.7 MEQ/L (ref 3.5–5.2)
PROGESTERONE LEVEL: 0.2 NG/ML
PTH INTACT: 84.1 PG/ML (ref 15–65)
RBC # BLD: 3.8 MILL/MM3 (ref 4.2–5.4)
RHEUMATOID FACTOR: < 10 IU/ML (ref 0–13)
SEDIMENTATION RATE, ERYTHROCYTE: 32 MM/HR (ref 0–20)
SEG NEUTROPHILS: 46.9 %
SEGMENTED NEUTROPHILS ABSOLUTE COUNT: 2.4 THOU/MM3 (ref 1.8–7.7)
SODIUM BLD-SCNC: 140 MEQ/L (ref 135–145)
T3 TOTAL: 123 NG/DL (ref 72–181)
TOTAL PROTEIN: 7.2 G/DL (ref 6.1–8)
TRIGL SERPL-MCNC: 103 MG/DL (ref 0–199)
TSH SERPL DL<=0.05 MIU/L-ACNC: 3.52 UIU/ML (ref 0.4–4.2)
VITAMIN D 25-HYDROXY: 44 NG/ML (ref 30–100)
WBC # BLD: 5.2 THOU/MM3 (ref 4.8–10.8)

## 2021-10-28 LAB
C-REACTIVE PROTEIN, HIGH SENSITIVITY: 2.4 MG/L
HOMOCYSTEINE: 26.6 UMOL/L

## 2021-10-29 LAB
DHEAS (DHEA SULFATE): 59.5 UG/DL (ref 10–90)
GLIADIN PEPTIDE IGG: < 0.4 U/ML
THYROXINE (T4): 8.4 UG/DL (ref 4.5–10.9)
TISSUE TRANSGLUTAMINASE IGA: 0.4 U/ML

## 2021-10-30 LAB — THYROID PEROXIDASE ANTIBODY: 5.2 IU/ML (ref 0–25)

## 2021-10-31 LAB — ANA SCREEN: NORMAL

## 2021-11-01 ENCOUNTER — NURSE ONLY (OUTPATIENT)
Dept: FAMILY MEDICINE CLINIC | Age: 76
End: 2021-11-01
Payer: MEDICARE

## 2021-11-01 DIAGNOSIS — R53.83 OTHER FATIGUE: Primary | ICD-10-CM

## 2021-11-01 LAB
DHEA UNCONJUGATED: 1.38 NG/ML (ref 0.63–4.7)
TESTOSTERONE, FREE W SHGB, FEMALES/CHILDREN: NORMAL

## 2021-11-01 PROCEDURE — 87426 SARSCOV CORONAVIRUS AG IA: CPT | Performed by: FAMILY MEDICINE

## 2021-11-03 LAB
SARS-COV-2: NOT DETECTED
SOURCE: NORMAL

## 2021-11-04 ENCOUNTER — TELEPHONE (OUTPATIENT)
Dept: FAMILY MEDICINE CLINIC | Age: 76
End: 2021-11-04

## 2021-11-11 ENCOUNTER — OFFICE VISIT (OUTPATIENT)
Dept: FAMILY MEDICINE CLINIC | Age: 76
End: 2021-11-11
Payer: MEDICARE

## 2021-11-11 VITALS
WEIGHT: 212.2 LBS | SYSTOLIC BLOOD PRESSURE: 108 MMHG | BODY MASS INDEX: 39.05 KG/M2 | OXYGEN SATURATION: 95 % | HEART RATE: 69 BPM | TEMPERATURE: 97.2 F | RESPIRATION RATE: 12 BRPM | DIASTOLIC BLOOD PRESSURE: 62 MMHG | HEIGHT: 62 IN

## 2021-11-11 DIAGNOSIS — E78.5 DYSLIPIDEMIA: ICD-10-CM

## 2021-11-11 DIAGNOSIS — I50.22 HYPERTENSIVE HEART DISEASE WITH CHRONIC SYSTOLIC CONGESTIVE HEART FAILURE (HCC): ICD-10-CM

## 2021-11-11 DIAGNOSIS — R53.83 OTHER FATIGUE: Primary | ICD-10-CM

## 2021-11-11 DIAGNOSIS — I11.0 HYPERTENSIVE HEART DISEASE WITH CHRONIC SYSTOLIC CONGESTIVE HEART FAILURE (HCC): ICD-10-CM

## 2021-11-11 DIAGNOSIS — R10.30 LOWER ABDOMINAL PAIN: ICD-10-CM

## 2021-11-11 DIAGNOSIS — I10 PRIMARY HYPERTENSION: ICD-10-CM

## 2021-11-11 DIAGNOSIS — N18.32 STAGE 3B CHRONIC KIDNEY DISEASE (HCC): ICD-10-CM

## 2021-11-11 DIAGNOSIS — I10 ESSENTIAL HYPERTENSION: ICD-10-CM

## 2021-11-11 DIAGNOSIS — E03.9 HYPOTHYROIDISM, UNSPECIFIED TYPE: ICD-10-CM

## 2021-11-11 DIAGNOSIS — K90.89 OTHER INTESTINAL MALABSORPTION: ICD-10-CM

## 2021-11-11 DIAGNOSIS — I50.32 CHRONIC HEART FAILURE WITH PRESERVED EJECTION FRACTION (HCC): ICD-10-CM

## 2021-11-11 DIAGNOSIS — M15.9 PRIMARY OSTEOARTHRITIS INVOLVING MULTIPLE JOINTS: ICD-10-CM

## 2021-11-11 DIAGNOSIS — R79.83 HOMOCYSTEINEMIA: ICD-10-CM

## 2021-11-11 DIAGNOSIS — I50.1 HEART FAILURE, LEFT, WITH LVEF <=30% (HCC): ICD-10-CM

## 2021-11-11 DIAGNOSIS — R73.09 LOW GLUCOSE LEVEL: ICD-10-CM

## 2021-11-11 DIAGNOSIS — J45.20 MILD INTERMITTENT ASTHMA WITHOUT COMPLICATION: ICD-10-CM

## 2021-11-11 PROCEDURE — 99214 OFFICE O/P EST MOD 30 MIN: CPT | Performed by: FAMILY MEDICINE

## 2021-11-11 PROCEDURE — G8484 FLU IMMUNIZE NO ADMIN: HCPCS | Performed by: FAMILY MEDICINE

## 2021-11-11 PROCEDURE — G8417 CALC BMI ABV UP PARAM F/U: HCPCS | Performed by: FAMILY MEDICINE

## 2021-11-11 PROCEDURE — 1036F TOBACCO NON-USER: CPT | Performed by: FAMILY MEDICINE

## 2021-11-11 PROCEDURE — 4040F PNEUMOC VAC/ADMIN/RCVD: CPT | Performed by: FAMILY MEDICINE

## 2021-11-11 PROCEDURE — 1090F PRES/ABSN URINE INCON ASSESS: CPT | Performed by: FAMILY MEDICINE

## 2021-11-11 PROCEDURE — 1123F ACP DISCUSS/DSCN MKR DOCD: CPT | Performed by: FAMILY MEDICINE

## 2021-11-11 PROCEDURE — G8399 PT W/DXA RESULTS DOCUMENT: HCPCS | Performed by: FAMILY MEDICINE

## 2021-11-11 PROCEDURE — G8427 DOCREV CUR MEDS BY ELIG CLIN: HCPCS | Performed by: FAMILY MEDICINE

## 2021-11-11 ASSESSMENT — ENCOUNTER SYMPTOMS
BACK PAIN: 1
CONSTIPATION: 1
RESPIRATORY NEGATIVE: 1
SINUS PAIN: 1

## 2021-11-11 NOTE — PATIENT INSTRUCTIONS
Thank you   1. Thank you for trusting us with your healthcare needs. You may receive a survey regarding today's visit. It would help us out if you would take a few moments to provide your feedback. We value your input. 2. Please bring in ALL medications BOTTLES, including inhalers, herbal supplements, over the counter, prescribed & non-prescribed medicine. The office would like actual medication bottles and a list.   3. Please note our OFFICE POLICIES:   a. Prior to getting your labs drawn, please check with your insurance company for benefits and eligibility of lab services. Often, insurance companies cover certain tests for preventative visits only. It is patient's responsibility to see what is covered. b. We are unable to change a diagnosis after the test has been performed. c. Lab orders will not be re-printed. Please hold onto your original lab orders and take them to your lab to be completed. d. If you no show your scheduled appointment three times, you will be dismissed from this practice. e. Priyanka Vigo must be completed 24 hours prior to your schedule appointment. 4. If the list below has been completed, PLEASE FAX RECORDS TO OUR OFFICE @ [phone].  Once the records have been received we will update your records at our office:  Health Maintenance Due   Topic Date Due    Shingles Vaccine (2 of 3) 12/08/2014    Colon cancer screen colonoscopy  08/17/2015    COVID-19 Vaccine (3 - Booster for Moderna series) 10/01/2021    DEXA (modify frequency per FRAX score)  08/14/2021

## 2021-11-11 NOTE — PROGRESS NOTES
[address] [facility] NINO Walker [facility] [id number]  Dept: [phone]  Dept Fax: [phone]  Loc: [phone]      Yovany Iqbal is a 68 y.o. White female. Masha Knows  presents to the [place] [address] clinic today for   Chief Complaint   Patient presents with    3 Month Follow-Up   , and;   1. Other fatigue    2. Primary osteoarthritis involving multiple joints    3. Chronic heart failure with preserved ejection fraction ([address]    4. Hypertensive heart disease with chronic systolic congestive heart failure ([address]    5. Primary hypertension    6. Stage 3b chronic kidney disease ([address]    7. Homocysteinemia    8. Dyslipidemia    9. Mild intermittent asthma without complication    10. Lower abdominal pain    11. Essential hypertension    12. Hypothyroidism, unspecified type    13. Other intestinal malabsorption    14. Low glucose level    15. Heart failure, left, with LVEF <=30% ([address]          I have reviewed Yovany Iqbal medical, surgical and other pertinent history in detail, and have updated medication and allergy information in the computerized patient record. Clinical Care Team:     -Referring Provider for today's consult: self  -Primary Care Provider: Jim Durham DO    Medical/Surgical History:   She  has a past medical history of Asthma, mild intermittent, CAD (coronary artery disease), Chronic low back pain, CKD (chronic kidney disease) stage 3, GFR 30-59 ml/min ([facility]), Controlled substance agreement signed; tramadol, Dyslipidemia, LYLA (generalized anxiety disorder), Heart failure with preserved ejection fraction ([address] History of gastroesophageal reflux (GERD), Homocysteinemia, Hypertension, Hypertensive heart disease with congestive heart failure ([address] Left bundle branch block, Obesity (BMI 30-39.9), SUNI on CPAP, Osteoarthritis, Osteopenia, Seasonal allergies, and Vitamin D deficiency.   Her  has a past surgical history that includes sinus surgery (1990); Rotator cuff repair (Bilateral, 2006  & 2009); Carpal tunnel release (08/2006); Rotator cuff repair (11/2009); Cardiac catheterization (10/30/14); eye surgery (Left, 04/23/2016); eye surgery (Right, 05/24/2006); Carpal tunnel release (Left, 2009); and Colonoscopy (2007). Family/Social History:     Her family history includes Cancer in her brother and father; Dementia in her brother; Diabetes in her maternal grandmother; Glaucoma in her brother; Heart Disease in her maternal grandmother and mother. She  reports that she is a non-smoker but has been exposed to tobacco smoke. She has never used smokeless tobacco. She reports that she does not drink alcohol and does not use drugs. Medications/Allergies/Immunizations:     Her current medication(s) include   Current Outpatient Medications:     Methylcobalamin 1000 MCG SUBL, Place 1 tablet under the tongue daily, Disp: , Rfl:     Levomefolate Glucosamine (METHYLFOLATE PO), Take 5 mg by mouth daily, Disp: , Rfl:     traMADol (ULTRAM) 50 MG tablet, Take 1 tablet by mouth every 8 hours as needed for Pain for up to 90 days. , Disp: 60 tablet, Rfl: 2    sertraline (ZOLOFT) 50 MG tablet, Take 1 tablet by mouth once daily, Disp: 90 tablet, Rfl: 3    ENTRESTO 49-51 MG per tablet, TAKE 1 TABLET BY MOUTH TWICE DAILY, Disp: , Rfl:     potassium chloride (KLOR-CON M) 10 MEQ extended release tablet, Take 1 tablet by mouth twice daily, Disp: 180 tablet, Rfl: 3    bumetanide (BUMEX) 2 MG tablet, Take 1 tablet by mouth once daily, Disp: 90 tablet, Rfl: 3    DHEA 10 MG TABS, Take 5 mg by mouth every morning (before breakfast) MCKENNA 5 mg tab at [employer], Disp: , Rfl:     acetaminophen (TYLENOL) 500 MG tablet, Take 500 mg by mouth nightly as needed for Pain, Disp: , Rfl:     Vitamins-Lipotropics (BALANCE B-100) TABS, Take 1 tablet by mouth 2 times daily (before meals) , Disp: , Rfl:     Omega 3 1000 MG CAPS, Take 1 capsule by mouth 4 times daily (before meals and nightly) (Patient taking differently: Take 1 capsule by mouth 4 times daily (before meals and nightly) [id number] [facility]), Disp: 100 capsule, Rfl: 5    Cinnamon 500 MG CAPS, Take 500 mg by mouth 3 times daily , Disp: , Rfl:     fluticasone (FLONASE) 50 MCG/ACT nasal spray, 1 spray by Each Nare route daily as needed for Rhinitis, Disp: , Rfl:     carvedilol (COREG) 6.25 MG tablet, Take 1 tablet by mouth 2 times daily (with meals), Disp: 60 tablet, Rfl: 3    Cholecalciferol (VITAMIN D3) 2000 units CAPS, Take 1 capsule by mouth daily Double Wednesday and Sunday(2 days will work), Disp: , Rfl:     cetirizine (ZYRTEC) 10 MG tablet, Take 10 mg by mouth daily as needed for Allergies, Disp: , Rfl:     Lactobacillus-Inulin ([address]) CAPS, Take 1 capsule by mouth daily , Disp: , Rfl:     Magnesium Oxide 500 MG CAPS, Take by mouth daily For more movements, Disp: , Rfl:     nitroGLYCERIN (NITROSTAT) 0.4 MG SL tablet, Place 0.4 mg under the tongue every 5 minutes as needed for Chest pain., Disp: , Rfl:     albuterol-ipratropium (COMBIVENT RESPIMAT)  MCG/ACT AERS inhaler, Inhale 1 puff into the lungs every 6 hours as needed for Wheezing., Disp: , Rfl:     aspirin 81 MG tablet, Take 81 mg by mouth daily. , Disp: , Rfl:     pravastatin (PRAVACHOL) 40 MG tablet, Take 40 mg by mouth daily. , Disp: , Rfl:   Allergies: Duricef [cefadroxil]  Immunizations:   Immunization History   Administered Date(s) Administered    COVID-19, Moderna, Primary or Immunocompromised, PF, 100mcg/0.5mL 03/02/2021, 04/01/2021    Influenza Vaccine, unspecified formulation 12/21/2016    Influenza Virus Vaccine 12/02/2015    Influenza, Quadv, adjuvanted, 65 yrs +, IM, PF (Fluad) 10/06/2020, 10/11/2021    Influenza, Triv, inactivated, subunit, adjuvanted, IM (Fluad 65 yrs and older) 11/29/2018, 12/16/2019    Pneumococcal Conjugate 13-valent (Alverto Adorno) 12/02/2015    Pneumococcal Polysaccharide ([id number]) 10/12/2011    Tdap (Boostrix, Adacel) 07/12/2015    Zoster Live (Zostavax) 10/13/2014        History of Present Illness:     Alana's had concerns including 3 Month Follow-Up. Tunde Hunt  presents to the [address] [place] [address] today for;   Chief Complaint   Patient presents with    3 Month Follow-Up   , abnormal labs follow up and these conditions as she  Is looking today for:     1. Other fatigue    2. Primary osteoarthritis involving multiple joints    3. Chronic heart failure with preserved ejection fraction ([address]    4. Hypertensive heart disease with chronic systolic congestive heart failure ([address]    5. Primary hypertension    6. Stage 3b chronic kidney disease ([facility]    7. Homocysteinemia    8. Dyslipidemia    9. Mild intermittent asthma without complication    10. Lower abdominal pain    11. Essential hypertension    12. Hypothyroidism, unspecified type    13. Other intestinal malabsorption    14. Low glucose level    15. Heart failure, left, with LVEF <=30% (HCC)      HPI    Subjective:     Review of Systems   Constitutional: Positive for fatigue and unexpected weight change. HENT: Positive for congestion and sinus pain. Eyes: Positive for visual disturbance. Respiratory: Negative. Cardiovascular: Positive for leg swelling. Gastrointestinal: Positive for constipation. Endocrine: Positive for cold intolerance. Genitourinary: Positive for frequency and menstrual problem. Musculoskeletal: Positive for arthralgias, back pain, gait problem, joint swelling, myalgias, neck pain and neck stiffness. Skin: Negative. Allergic/Immunologic: Positive for environmental allergies. Neurological: Positive for dizziness, light-headedness and headaches. Hematological: Bruises/bleeds easily. Psychiatric/Behavioral: Positive for dysphoric mood. The patient is nervous/anxious. All other systems reviewed and are negative.       Objective: /62 (Site: Right Upper Arm, Position: Sitting, Cuff Size: Medium Adult)   Pulse 69   Temp 97.2 °F (36.2 °C) (Temporal)   Resp 12   Ht 5' 2\" (1.575 m)   Wt 212 lb 3.2 oz (96.3 kg)   SpO2 95%   BMI 38.81 kg/m²   Physical Exam  Vitals and nursing note reviewed. Constitutional:       Appearance: Normal appearance. HENT:      Head: Normocephalic. Pulmonary:      Effort: Pulmonary effort is normal.   Neurological:      Mental Status: She is alert. Psychiatric:         Mood and Affect: Mood normal.         Thought Content: Thought content normal.            Laboratory Data:   Lab results were searched in Care Everywhere and/or those brought by the pateint were reviewed today with Priscila Fernandez and she has a copy of their most recent labs to take home with them as noted below;       Imaging Data:   Imaging Data:       Assessment & Plan:       Impression:  1. Other fatigue    2. Primary osteoarthritis involving multiple joints    3. Chronic heart failure with preserved ejection fraction ([address]    4. Hypertensive heart disease with chronic systolic congestive heart failure ([address]    5. Primary hypertension    6. Stage 3b chronic kidney disease ([address]    7. Homocysteinemia    8. Dyslipidemia    9. Mild intermittent asthma without complication    10. Lower abdominal pain    11. Essential hypertension    12. Hypothyroidism, unspecified type    13. Other intestinal malabsorption    14. Low glucose level    15. Heart failure, left, with LVEF <=30% (HCC)      Assessment and Plan:  After reviewing the patients chief complaints, reviewing their labfindings in great detail (with the patient and those accompanying them) which correlate to their chief complaints, symptoms, and or medical conditions; suggestions were made relating to changes in diet and or supplements which may improve the complaints and which will be reflected in their future lab findings;   Chief Complaint   Patient presents with    3 Month Follow-Up ;    Plans for the next visits:  - Abnormal and non-optimal Labs were ordered today to be repeated in the next 120-365 days to assess changes from adjustments in nutrition and or nutrients. - Patient instructed when having a blood draw to ask the  to divide their lab draws into multiple draws over several days if not feeling good at the time of the lab draw or if either prefers to do several smaller blood draws over several days  -Patient instructed to check with insurer before each lab draw and to go to the lab which the insurer directs them for the most cost effective lab draw with the least patient's cost  - Link Guess  will be scheduled subsequent to those results. Macrina Rendon will bring in her drink, food, supplement log to her next visit    Chronic Problems Addressed on this Visit:                                   1.  Intensity of Service; Uncontrolled items at this visit; Chief Complaint   Patient presents with    3 Month Follow-Up   ; Improved items at this visit and Stable items were discussed at this visit;  2. Patients food, drinks, supplements and symptoms were reviewed with the patient,       - Link Guess will bring food, drink, supplements and symptoms log to next visit for inclusion in their record      - 75 better food list reviewed & given to patient with the omega 6 food list to avoid      - The 52 Latex foods list was reviewed and given to the patients with the information on carrageenan         - Gluten in corn and oats abstracts sheet reviewed and given to the patient today   3.    Greater than 30 minutes time was spent with the patient face to face on this visit; of which >50% was for counseling and coordination of care, as well as the time spent before and after the visit reviewing the chart, documenting the encounter, reviewing labs,reports, NIH listed studies, making phone calls, etc.      Patients food and drinks were reviewed with the patient,   - They will bring a food drink symptom log to future visits for inclusion in their record    - 75 better food list reviewed & given to patient along with the omega 6 food list to avoid      - Gluten in corn and oats abstracts sheet reviewed and given to the patient today    - 23 Foods containing Latex-like proteins was reviewed and copy to be taken if desired     - Nutrient Supplements list provided and copyto be taken if desired    - [employer] web site offered to patient to review at their convenience by staff with login information    Note:  I have discussed with the patient that with all nutraceuticals, there is often mixed data and emerging research which needs to be monitored; as well as an array of NIH fact sheets on nutrients and supplements, available at www.nih,issue plus [employer]. [employer] plus www.[employer],org. If I have recommended cinnamon at the request of this patient to assist them in control of their blood sugar, triglyceride, and/or weight issues. I discussed that the patient's clinical use of cinnamon bark, calcium, magnesium, Vitamin D, and pharmaceutical grade [employer] omega 3 oil or triple-strength fish oil, and B-50/B-100 time-released B-complex by [address] [facility] will be for a time-limited trial to determine their individual effectiveness and safety in this patient. I also referred the patient to the NMCD: Nutrition, Metabolism, and Cardiovascular Diseases (SecuritiesCard.pl) and concerns about long-term use and hepatotoxicity of cinnamon and other nutrients. I suggested they frequently search nih.gov for the latest non-proprietary information on nutriceuticals as well as consider a subscription to [employer] for details on reviewed supplements, or at the least review the nutrient files at [facility] at [facility], [address] bark, an insulin mimetic, reduces some High Carbohydrate Dietary Impacts.   Methylhydroxychalcone polymers insulin-enhancing properties in fat cells are responsible for enhanced glucose uptake, inhibiting hepatic HMG-CoA reductase and lowers lipids. www.jacn. org/content/20/4/327.full     But cinnamon with additives such as Cinnamon Extract are not effective as insulin mimetics.  :eStoreDirectory.at     Nutrients for Start up from [employer] or [employer] for ease to get started now;  Gabriel Serna has some useable products;  - Triple Strength Fish Oil, enteric coated  - Vit D-3 5000 IU gel caps  - Iron ferrous sulfate 325 mg tabs  - Centrum Silver look-a-like for most patients, or  - Centrum plain look-a-like if need iron    Local pharmacies or chains such as [employer], [employer], have:  - SUPENTA pharmaceutical grade omega 3 is 90% EPA/DHA whereas most Triple strength fish oil are 75% EPA/DHA  - Triple Strength Fish Oil (enteric coated if available) or if not enteric coated, can take from freezer for less burps  - B-50 or B-100 released balanced B complex tabs by [address] [facility] bark 500 mg (without Chromium or extracts)   some brands list 1000 mg / serving of 2 capsules,    some brands have 1000 mg caps with the undesireable chromium extract  - Calcium carbonate/citrate, magnesium oxide/citrate, Vit D-3 as 3-4 tabs/caps/serving     Some Local Brands may contain Zinc which is acceptable for the first bottle or two  - Magnesium oxide 250 mg tabs for those having < 2 bowel movements daily  - Magnesium citrate 200 mg if having > 2 bowel movements/day  - Centrum Silver or look-a-like for most patients, Centrum plain or look-a-like with iron  - Vitamin D-3 comes as 1,000 IU or 2,000 IU or 5,000 IU gel caps or Liquid drops but keep Vitamin D levels <50 but >40     Some brands containing or derived from soy oil or corn oil are OK if not allergic to soy  - Elemental Iron 65 mg tabs at bedtime is available over the counter if need more iron     Usually turns bowel movements grey, green, or black but not a concern  - Apricot Kernel Oil (by Now) for dry skin sensitive perineal or perianal area skin    Nutrients for ongoing use by Mail order for less expense from www. [employer] ;  - Strength Fish Oil , 240 Softgels Item #867306  -B-100 time released balanced B complex Item #324352  - Cinnamon bark 500 mg without Chromium or extract Item #324261  - Calcium carbonate 1000 mg, Magnesium oxide 500 mg, Vit D-3 400 IU Item #305116  - Magnesium oxide 500 mg tabs Item #441378 if less than 2 bowel movements daily  - [employer] Item #497286 for most patients, One Daily Item #633575 with iron  - Vit D 3  1,000 Item #179403      2,000 IU Item #206436   Item #386667     Some brands containing orderived from soy oil or corn oil are OK if not allergic to soy    Nutrients for Special Needs by Mail order for less expense from www. puritan.com;  -Elemental Iron 65 mg tabs Item #238366 if need more iron for low iron on labs    Usually turns bowel movements grey, green or black but not a concern  - Time released Niacin 250 mg Item #224049 for cold intolerance, low libido or impotence  - DHEA 50 mg Item #414618 for improving DHEA levels on labs if having Fatigue    If stools too loose substitute for your Magnesium oxide using;   Magnesium citrate 200 mg tabs (NOT liquid) at "[employer]   Magnesium gluconate 550 mg by Richard Estrada at [employer] or [employer]. com  Magnesium chloride foot soaks or body sprays  www.[employer]   Magnesium chloride flakes 14.99 Item #: [id number] if back-ordered, get spray  Magnesium threonate, Magtein also helps mental clarity and sleep    Food Drink Symptom Log;  I asked this patient to track these items and any other symptoms on their list on a weekly basis to documenttheir progress or lack of same.  This can be done on the symptom tracking sheet I gave them at today's visit but looks like this:                                                      Rate on scale of 0-10 with zero = not noticeable  Symptom:                            Week 1               2                 3                 4               Etc            Hair loss    Foot cramps    Paresthesia    Aches    IBS (irritable bowel)    Constipation    Diarrhea  Nocturia (up to bathroom at night)    Fatigue/Energy level  Stress      On the other side of the sheet they can track their food, drink, environment, activity, symptoms etc      Avoiding Latex-like proteins in my foods; Avocados, Bananas, Celery, Figs & Kiwi proteins have latex-like proteins to inflame our immune systems, plus 47 more foods  How Can I Have A Latex Allergy? Eating foods with latex-like protein exposes us to latex allergies. Our body cannot tell the differencebetween these latex-like proteins and latex from rubber products since many people are allergic to fruit, vegetables and latex. Read labels on pre-packaged foods. This list to avoid is only a guide if you are known allergicto latex or have a latex rash on your chin, cheeks and lines on your neck and chest. The amount of latex is different in each food product or fruit variety. Avoid out of Season if not grown locally:   Melon, Nectarine, Papaya, Cherry, Passion fruit, Plum, Chestnuts, and Tomato. Avocado, Banana, Celery, Figs, and Kiwi always contain Latex-like protein. Whats in Season? Strawberries taste better in June than December because June is strawberry season so buy locally grown produce \"in season\" for the best flavor, cost, and less Latex. Locally grown produce not only tastes great but also requires little or no ethylene exposure in food distribution so has less latex content. Out of season: use canned, frozen, or dried since those are processed ripe and latex content is lower!!!     Month     [place] Locally Grown Produce  January, February, March: use canned, frozen or dried fruits since lower in latex  April: asparagus, radishes  May: asparagus, broccoli, green onions, greens, peas, radishes, rhubarb  June: asparagus, beets, beans, broccoli, cabbage, cantaloupe, carrots, green onions, greens, lettuce, onions, parsley, peas, radishes, rhubarb, strawberries, watermelons  July: beans, beets, blueberries, broccoli, cabbage, cantaloupe, carrots, cauliflower, celery, cucumbers, eggplant, grapes, green onions, greens, lettuce, onions, parsley, peas, peaches, bell peppers, potatoes, radishes, summer raspberries, squash, sweetcorn, tomatoes, turnips, watermelons  August: apples, beans, beets, blueberries, cabbage, cantaloupe, carrots, cauliflower, celery, cucumbers, eggplant, grapes, green onions, greens, lettuce, onions, parsley, peas, peaches, pears, bell peppers, potatoes, radishes, squash, sweet corn, tomatoes, turnips, watermelons  September: apples, beans, beets, blueberries, cabbage, cantaloupe, carrots, cauliflower, celery, cucumbers, eggplant, grapes, green onions, greens, lettuce, onions, parsley, peas, peaches, pears, bell peppers, plums, potatoes, pumpkins, radishes, fall red raspberries, squash, sweet corn, tomatoes, turnips, watermelons  October: apples, beets, broccoli, cabbage, carrots, cauliflower, celery, green onions, greens, lettuce, parsley, peas, pears, potatoes, pumpkins, radishes, fall red raspberries, squash, turnips  November: broccoli, cabbage, carrots, parsley, pears, peas  December: use canned, frozen or dried fruits since lower in latex    Upto half of latex-sensitive patients show allergic reactions to fruits (avocados, bananas, kiwifruits, papayas, peaches),   Annals of Allergy, 1994. These plants contain the same proteins that are allergens in latex. People with fruit allergies should warn physicians before undergoing procedures which may cause anaphylactic reaction if in contact with latex gloves.   Some of the common foods with defined cross-reactivity to latex are avocado, banana, kiwi, chestnut, raw potato, tomato, stone fruits (e.g., peach, cherry), hazelnut, melons, celery, carrot, apple, pear, papaya, and almond. Foods with less well-defined cross-reactivity to latex are peanuts, peppers, citrus fruits, coconut, pineapple, john, fig, passion fruit, Ugli fruit, and grape. This fruit/latex cross-reactivity is worsened by ethylene, a gas used to hasten commercial ripening. In nature, plants produce low levels of the hormone ethylene, which regulates germination, flowering, and ripening. Forced ripening by high ethylene concentrations, plants produce allergenic wound-repair proteins, which are similar to wound-repair proteins made during the tapping of rubber trees. Sensitive individuals who ingest the fruit get a higher dose and worse reaction. Some people may even first become sensitized to latex through fruit. Can food processing increase the concentrations of allergenic proteins? Latex-sensitized children (and adults) in [place] often experience allergic reactions after eating bananas ripened artificially with ethylene. In the [place], food distribution centers treat unripe bananas and other produce with ethylene to ripen; not commonly done in [facility] since fruit is tree-ripened there. Does treatment of food with ethylene induce banana proteins that cross-react with latex? (Blayne et al.)    References:   Latex in Foods Allergy, http://ehp.niehs.nih.gov/members/2003/5811/5811.html    Search web for [employer] in Season \" for where you live or are at the time you food shop   Management of Latex, ://medicalcenter. [place].edu/  search for nih, latex-like proteins in foods

## 2021-12-06 ENCOUNTER — HOSPITAL ENCOUNTER (OUTPATIENT)
Age: 76
Setting detail: SPECIMEN
Discharge: HOME | End: 2021-12-06
Payer: MEDICARE

## 2021-12-06 ENCOUNTER — PATIENT MESSAGE (OUTPATIENT)
Dept: FAMILY MEDICINE CLINIC | Age: 76
End: 2021-12-06

## 2021-12-06 DIAGNOSIS — Z20.822 CLOSE EXPOSURE TO COVID-19 VIRUS: Primary | ICD-10-CM

## 2021-12-06 DIAGNOSIS — Z20.822 CLOSE EXPOSURE TO COVID-19 VIRUS: ICD-10-CM

## 2021-12-06 LAB
INFLUENZA A: NOT DETECTED
INFLUENZA B: NOT DETECTED
SARS-COV-2 RNA, RT PCR: NOT DETECTED

## 2021-12-06 PROCEDURE — 87636 SARSCOV2 & INF A&B AMP PRB: CPT

## 2021-12-06 NOTE — TELEPHONE ENCOUNTER
Patient came into the office to  the lab orders, was informed of Dr Loy Boudreaux message that they can go to [address] and the orders can be pulled up on their computer. Patient verbalized understanding.

## 2021-12-06 NOTE — TELEPHONE ENCOUNTER
From: Zaida Urban  To: Dr. Sanchez Michel: 12/6/2021 4:39 AM EST  Subject: Lima Schaefer,    I have a friend that tested positive for COVID 19  on Dec 2 nd. I was with this person on December 1 st.  She is quarantined until December 11 th. What do you recommend my  and I do? Do we have to quarantine ourselves. We do want to be tested. We would like to schedule an appointment for the [facility] booster shot also. Please let us know your thoughts.   Thank You,  [address] [facility] 00-  [facility] [phone]

## 2021-12-07 ENCOUNTER — TELEPHONE (OUTPATIENT)
Dept: FAMILY MEDICINE CLINIC | Age: 76
End: 2021-12-07

## 2021-12-07 NOTE — TELEPHONE ENCOUNTER
----- Message from Giorgi Allen DO sent at 12/6/2021  7:18 PM EST -----  Please let pt know that COVID testing is negative. Call any questions or development of concerning symptoms. Let me know if questions, thanks!

## 2022-01-13 DIAGNOSIS — M15.9 PRIMARY OSTEOARTHRITIS INVOLVING MULTIPLE JOINTS: ICD-10-CM

## 2022-01-13 DIAGNOSIS — Z79.899 CONTROLLED SUBSTANCE AGREEMENT SIGNED: ICD-10-CM

## 2022-01-14 ENCOUNTER — TELEPHONE (OUTPATIENT)
Dept: FAMILY MEDICINE CLINIC | Age: 77
End: 2022-01-14

## 2022-01-14 ENCOUNTER — PATIENT MESSAGE (OUTPATIENT)
Dept: FAMILY MEDICINE CLINIC | Age: 77
End: 2022-01-14

## 2022-01-14 DIAGNOSIS — U07.1 COVID-19: Primary | ICD-10-CM

## 2022-01-14 RX ORDER — TRAMADOL HYDROCHLORIDE 50 MG/1
50 TABLET ORAL EVERY 8 HOURS PRN
Qty: 60 TABLET | Refills: 2 | Status: SHIPPED | OUTPATIENT
Start: 2022-01-14 | End: 2022-02-07 | Stop reason: SDUPTHER

## 2022-01-14 RX ORDER — CHOLECALCIFEROL (VITAMIN D3) 50 MCG
2000 TABLET ORAL DAILY
Qty: 30 TABLET | Refills: 0 | Status: SHIPPED | OUTPATIENT
Start: 2022-01-14 | End: 2022-04-08

## 2022-01-14 RX ORDER — MULTIVIT WITH MINERALS/LUTEIN
1000 TABLET ORAL DAILY
Qty: 30 TABLET | Refills: 0 | Status: SHIPPED | OUTPATIENT
Start: 2022-01-14 | End: 2022-04-08

## 2022-01-14 NOTE — TELEPHONE ENCOUNTER
From: Gracie Severance  To: Dr. Casas Route: 1/14/2022 11:12 AM EST  Subject: [facility] - V1-    Hi Dr Tai Wilkinson,   I have a couple concerns regarding COVID. I took a home test last night and it was positive. My temp this morning is 99.2. Do I quarantine 10 days? My throat is a little scratchy. Should I get some type of med for this? I  My mucus has a light yellow color to it. I want to do the right thing. Yves Jordan 82- is in [address] [place] [address] will be going to nursing home anytime for a while. If you could let me know what I should do.   Thanks,  Shaan Velásquez

## 2022-01-14 NOTE — TELEPHONE ENCOUNTER
Spoke to pt  Mild to moderate sxs  Feels tired  No SOB  No fevers  Will add Vit C/D and zinc  Setting up for monoclonals  paxlovid etc not available in our area    Reviewed ER precautions, pt understands. Diagnosis Orders   1.  COVID-19  Ascorbic Acid (VITAMIN C) 1000 MG tablet    vitamin D (CHOLECALCIFEROL) 50 MCG (2000 UT) TABS tablet    zinc 50 MG CAPS

## 2022-01-14 NOTE — TELEPHONE ENCOUNTER
ASSESSMENT & PLAN   Diagnosis Orders   1. Primary osteoarthritis involving multiple joints  traMADol (ULTRAM) 50 MG tablet   2. Controlled substance agreement signed; tramadol  traMADol (ULTRAM) 50 MG tablet       OAARS reviewed and appropriate. Controlled Substances Monitoring: Periodic Controlled Substance Monitoring: Possible medication side effects, risk of tolerance/dependence & alternative treatments discussed. ,No signs of potential drug abuse or diversion identified. ,Assessed functional status.  Jak Singh DO)      Future Appointments   Date Time Provider [facility]   3/14/2022  1:00 PM Kurt Owen MD [facility] [place]   4/11/2022  1:00 PM Jak Singh DO [address] [facility]

## 2022-01-14 NOTE — TELEPHONE ENCOUNTER
Called  [facility] , the will get pt set up for atbx infusion .   ( ext [phone] )

## 2022-01-17 ENCOUNTER — TELEPHONE (OUTPATIENT)
Dept: FAMILY MEDICINE CLINIC | Age: 77
End: 2022-01-17

## 2022-01-17 NOTE — TELEPHONE ENCOUNTER
Pt c/o nasal congestion, cough, sinus pressure  No fever ,a little SOB  Spoke with Hanh Liz @ OP nursing at this time there are no openings, pt put on the cancellation list

## 2022-01-18 ENCOUNTER — TELEPHONE (OUTPATIENT)
Dept: FAMILY MEDICINE CLINIC | Age: 77
End: 2022-01-18

## 2022-01-18 NOTE — TELEPHONE ENCOUNTER
----- Message from Junior Cash DO sent at 1/18/2022  6:38 AM EST -----  Please f/u with pt and see how they are doing with their covid symptoms  Let me know, thanks!

## 2022-01-18 NOTE — TELEPHONE ENCOUNTER
Patient reports that she has a headache, and productive cough and still feels \"crappy\" and that she is hanging in there

## 2022-01-19 ENCOUNTER — TELEPHONE (OUTPATIENT)
Dept: FAMILY MEDICINE CLINIC | Age: 77
End: 2022-01-19

## 2022-01-19 NOTE — TELEPHONE ENCOUNTER
----- Message from Stas Nguyen DO sent at 1/19/2022  6:52 AM EST -----  Please f/u with pt and see how they are doing with their covid symptoms  Let me know, thanks!     Future Appointments  1/21/2022  4:00 PM    STR EXAM ROOM 2 COVID INF# [facility] OP NURS        [facility]  3/14/2022  1:00 PM    Juan Patino MD        [facility] [address]  4/11/2022  1:00 PM    Stas Nguyen DO     [address] [place] [address]

## 2022-01-19 NOTE — TELEPHONE ENCOUNTER
Sofiya Paul  Con't to monitor sxs  Call any changes  Let me know if questions, thanks!     Future Appointments   Date Time Provider [facility] Dos Santos   1/21/2022  4:00 PM STR EXAM ROOM 2 COVID INFUSION [facility] OP NURS [facility]   3/14/2022  1:00 PM Khalida Luna MD [facility]  [facility]   4/11/2022  1:00 PM Patrick Pressley DO [address] [facility]

## 2022-01-20 ENCOUNTER — TELEPHONE (OUTPATIENT)
Dept: FAMILY MEDICINE CLINIC | Age: 77
End: 2022-01-20

## 2022-01-20 ENCOUNTER — PATIENT MESSAGE (OUTPATIENT)
Dept: FAMILY MEDICINE CLINIC | Age: 77
End: 2022-01-20

## 2022-01-20 NOTE — TELEPHONE ENCOUNTER
Patient states that she is ok just tired from yesterday running around getting spouse situated in new facility   Patient states that she does not have SOB, no fever, no diarrhea just tired and is going to rest

## 2022-01-20 NOTE — TELEPHONE ENCOUNTER
----- Message from Bindu Sal DO sent at 1/20/2022  6:24 AM EST -----  Please f/u with pt and see how they are doing with their covid symptoms  Let me know, thanks!     Future Appointments  1/21/2022  4:00 PM    STR EXAM ROOM 2 COVID INF# [facility] OP NURS        [facility]  3/14/2022  1:00 PM    Mariam Lee MD        [facility]  4/11/2022  1:00 PM    Bindu Sal DO     [address] [facility]

## 2022-01-21 ENCOUNTER — TELEPHONE (OUTPATIENT)
Dept: FAMILY MEDICINE CLINIC | Age: 77
End: 2022-01-21

## 2022-01-21 ENCOUNTER — HOSPITAL ENCOUNTER (OUTPATIENT)
Dept: NURSING | Age: 77
End: 2022-01-21

## 2022-01-21 NOTE — TELEPHONE ENCOUNTER
As early as 2 wks post infection or can wait 90 days. I would recommend waiting 90 days for the booster as getting covid is like getting a booster shot as far as the immune system is concerned. Let me know if questions, thanks!

## 2022-01-21 NOTE — TELEPHONE ENCOUNTER
From: Echo Nesbitt  To: Dr. Granger Button: 1/20/2022 6:43 PM EST  Subject: Infusion IV - [place] [facility] 68-    Hi Dr Ji Reynolds,  I apologize for refusing the Infusion procedure. I am scared & of course horror stories out there. I know I should have listened to you. Do I have to worry about drowning from the infusion? My BP was 134/70 & pulse was 61 at 5:30 today. I did have a little diarrhea this afternoon. My temp is 99.3 as of 6:30 PM. Am I too late to get help? ? Thank you again for placing [facility] in [facility] in [address] [facility]. It is a very nice facility & staff is professional & caring. [facility] will get good care. Will look forward to hearing from you.    Thank you, [place]

## 2022-01-21 NOTE — TELEPHONE ENCOUNTER
Tor Perezuty   Call any changes  If develops severe sxs over the weekend, go to ER  Let me know if questions, thanks!

## 2022-01-21 NOTE — TELEPHONE ENCOUNTER
Pt states she feeling better this am  Had sweats last night, but none today  Pt c/o fatigue   No fever or SOB

## 2022-01-21 NOTE — TELEPHONE ENCOUNTER
----- Message from Junior Cash DO sent at 1/21/2022  6:18 AM EST -----  Please f/u with pt and see how they are doing with their covid symptoms  Let me know, thanks!

## 2022-01-24 ENCOUNTER — TELEPHONE (OUTPATIENT)
Dept: FAMILY MEDICINE CLINIC | Age: 77
End: 2022-01-24

## 2022-01-24 NOTE — TELEPHONE ENCOUNTER
Pt states she doing better, getting stronger, appetite improving, temp 98.7 this am, no SOB , no other sxs

## 2022-01-24 NOTE — TELEPHONE ENCOUNTER
----- Message from Floridalma Moyer DO sent at 1/24/2022  6:43 AM EST -----  Please f/u with pt and see how they are doing with their covid symptoms  Let me know, thanks!

## 2022-02-04 ENCOUNTER — PATIENT MESSAGE (OUTPATIENT)
Dept: FAMILY MEDICINE CLINIC | Age: 77
End: 2022-02-04

## 2022-02-04 NOTE — TELEPHONE ENCOUNTER
From: Savi Odell  To: Dr. Morgan Glenn: 2/4/2022 2:43 AM EST  Subject: Tramadol Refill - Lakia Marks - 02-    Hi Dr Edel Cardenas,  I have a question regarding refill of Tramadol:    A refill was completed on 12/12/2021 - QTY - 60  A refill was completed on 01/14/2022 - QTY - 20  A refill was completed on 01/31/2022 - QTY - 20    I was wondering why my quantity was reduced to 20. I have taken 2 a day (1 in AM + 1 in PM) for several years and it controls my hips & back pain. Is it possible to go back to the 60 QTY? Thanking you in advance for your assistance.     Lakia Marks

## 2022-02-07 ENCOUNTER — PATIENT MESSAGE (OUTPATIENT)
Dept: FAMILY MEDICINE CLINIC | Age: 77
End: 2022-02-07

## 2022-02-07 DIAGNOSIS — Z79.899 CONTROLLED SUBSTANCE AGREEMENT SIGNED: ICD-10-CM

## 2022-02-07 DIAGNOSIS — M15.9 PRIMARY OSTEOARTHRITIS INVOLVING MULTIPLE JOINTS: ICD-10-CM

## 2022-02-07 RX ORDER — TRAMADOL HYDROCHLORIDE 50 MG/1
50 TABLET ORAL EVERY 8 HOURS PRN
Qty: 60 TABLET | Refills: 2 | Status: SHIPPED | OUTPATIENT
Start: 2022-02-07 | End: 2022-02-10 | Stop reason: SDUPTHER

## 2022-02-07 NOTE — TELEPHONE ENCOUNTER
From: Miah Chopra  To: Dr. Soha Roblero: 2/7/2022 1:55 PM EST  Subject: Tramadol - Gaylyn Bulgarian Dr. Ayaz Andrade, 2-7-22    I called the insurance company this morning. They said I should ask you to write a new prescription and change the quantity to 60. I think you are right, I got a short order & they never gave me the rest of the pills. Thank you so much for your patience with us on this and the COVID mess.  You are the best!!   [facility]

## 2022-02-08 NOTE — TELEPHONE ENCOUNTER
ASSESSMENT & PLAN   Diagnosis Orders   1. Primary osteoarthritis involving multiple joints  traMADol (ULTRAM) 50 MG tablet   2. Controlled substance agreement signed; tramadol  traMADol (ULTRAM) 50 MG tablet       OAARS reviewed and appropriate. Controlled Substances Monitoring: Periodic Controlled Substance Monitoring: Possible medication side effects, risk of tolerance/dependence & alternative treatments discussed. ,No signs of potential drug abuse or diversion identified. ,Obtaining appropriate analgesic effect of treatment.  Marlen Dunn DO)      Future Appointments   Date Time Provider [facility]   3/14/2022  1:00 PM Amina Thakur MD [facility] [place]   4/11/2022  1:00 PM Marlen Dunn DO [address] [facility]

## 2022-03-03 ENCOUNTER — TELEPHONE (OUTPATIENT)
Dept: FAMILY MEDICINE CLINIC | Age: 77
End: 2022-03-03

## 2022-03-03 NOTE — TELEPHONE ENCOUNTER
----- Message from Hayden Hughes sent at 3/2/2022  4:43 PM EST -----  Subject: Message to Provider    QUESTIONS  Information for Provider? pt. would like to know where she should go to   get her lab work done for her appt on Mar. 14th. Please call pt. and   advise.  ---------------------------------------------------------------------------  --------------  CALL BACK INFO  What is the best way for the office to contact you? OK to leave message on   voicemail  Preferred Call Back Phone Number? [phone]  ---------------------------------------------------------------------------  --------------  SCRIPT ANSWERS  Relationship to Patient?  Self

## 2022-03-04 ENCOUNTER — NURSE ONLY (OUTPATIENT)
Dept: LAB | Age: 77
End: 2022-03-04

## 2022-03-04 DIAGNOSIS — I50.22 HYPERTENSIVE HEART DISEASE WITH CHRONIC SYSTOLIC CONGESTIVE HEART FAILURE (HCC): ICD-10-CM

## 2022-03-04 DIAGNOSIS — I50.1 HEART FAILURE, LEFT, WITH LVEF <=30% (HCC): ICD-10-CM

## 2022-03-04 DIAGNOSIS — I10 ESSENTIAL HYPERTENSION: ICD-10-CM

## 2022-03-04 DIAGNOSIS — K90.89 OTHER INTESTINAL MALABSORPTION: ICD-10-CM

## 2022-03-04 DIAGNOSIS — E78.5 DYSLIPIDEMIA: ICD-10-CM

## 2022-03-04 DIAGNOSIS — M15.9 PRIMARY OSTEOARTHRITIS INVOLVING MULTIPLE JOINTS: ICD-10-CM

## 2022-03-04 DIAGNOSIS — N18.32 STAGE 3B CHRONIC KIDNEY DISEASE (HCC): ICD-10-CM

## 2022-03-04 DIAGNOSIS — I11.0 HYPERTENSIVE HEART DISEASE WITH CHRONIC SYSTOLIC CONGESTIVE HEART FAILURE (HCC): ICD-10-CM

## 2022-03-04 DIAGNOSIS — I50.32 CHRONIC HEART FAILURE WITH PRESERVED EJECTION FRACTION (HCC): ICD-10-CM

## 2022-03-04 DIAGNOSIS — R73.09 LOW GLUCOSE LEVEL: ICD-10-CM

## 2022-03-04 DIAGNOSIS — I10 PRIMARY HYPERTENSION: ICD-10-CM

## 2022-03-04 DIAGNOSIS — J45.20 MILD INTERMITTENT ASTHMA WITHOUT COMPLICATION: ICD-10-CM

## 2022-03-04 DIAGNOSIS — R79.83 HOMOCYSTEINEMIA: ICD-10-CM

## 2022-03-04 DIAGNOSIS — R10.30 LOWER ABDOMINAL PAIN: ICD-10-CM

## 2022-03-04 DIAGNOSIS — R53.83 OTHER FATIGUE: ICD-10-CM

## 2022-03-04 DIAGNOSIS — E03.9 HYPOTHYROIDISM, UNSPECIFIED TYPE: ICD-10-CM

## 2022-03-04 LAB
ANION GAP SERPL CALCULATED.3IONS-SCNC: 13 MEQ/L (ref 8–16)
BASOPHILS # BLD: 1.4 %
BASOPHILS ABSOLUTE: 0.1 THOU/MM3 (ref 0–0.1)
BUN BLDV-MCNC: 22 MG/DL (ref 7–22)
C-REACTIVE PROTEIN, HIGH SENSITIVITY: 1.7 MG/L
CALCIUM SERPL-MCNC: 9.4 MG/DL (ref 8.5–10.5)
CHLORIDE BLD-SCNC: 106 MEQ/L (ref 98–111)
CO2: 22 MEQ/L (ref 23–33)
CREAT SERPL-MCNC: 1.2 MG/DL (ref 0.4–1.2)
EOSINOPHIL # BLD: 9.9 %
EOSINOPHILS ABSOLUTE: 0.5 THOU/MM3 (ref 0–0.4)
ERYTHROCYTE [DISTWIDTH] IN BLOOD BY AUTOMATED COUNT: 13.2 % (ref 11.5–14.5)
ERYTHROCYTE [DISTWIDTH] IN BLOOD BY AUTOMATED COUNT: 46.4 FL (ref 35–45)
GFR SERPL CREATININE-BSD FRML MDRD: 44 ML/MIN/1.73M2
GLUCOSE BLD-MCNC: 93 MG/DL (ref 70–108)
HCT VFR BLD CALC: 36.9 % (ref 37–47)
HEMOGLOBIN: 11.7 GM/DL (ref 12–16)
HOMOCYSTEINE: 24 UMOL/L
IMMATURE GRANS (ABS): 0.01 THOU/MM3 (ref 0–0.07)
IMMATURE GRANULOCYTES: 0.2 %
LYMPHOCYTES # BLD: 31.8 %
LYMPHOCYTES ABSOLUTE: 1.6 THOU/MM3 (ref 1–4.8)
MAGNESIUM: 2.3 MG/DL (ref 1.6–2.4)
MCH RBC QN AUTO: 30.3 PG (ref 26–33)
MCHC RBC AUTO-ENTMCNC: 31.7 GM/DL (ref 32.2–35.5)
MCV RBC AUTO: 95.6 FL (ref 81–99)
MONOCYTES # BLD: 7 %
MONOCYTES ABSOLUTE: 0.3 THOU/MM3 (ref 0.4–1.3)
NUCLEATED RED BLOOD CELLS: 0 /100 WBC
PLATELET # BLD: 217 THOU/MM3 (ref 130–400)
PMV BLD AUTO: 10 FL (ref 9.4–12.4)
POTASSIUM SERPL-SCNC: 4.8 MEQ/L (ref 3.5–5.2)
PTH INTACT: 62.8 PG/ML (ref 15–65)
RBC # BLD: 3.86 MILL/MM3 (ref 4.2–5.4)
SEDIMENTATION RATE, ERYTHROCYTE: 48 MM/HR (ref 0–20)
SEG NEUTROPHILS: 49.7 %
SEGMENTED NEUTROPHILS ABSOLUTE COUNT: 2.4 THOU/MM3 (ref 1.8–7.7)
SODIUM BLD-SCNC: 141 MEQ/L (ref 135–145)
VITAMIN D 25-HYDROXY: 51 NG/ML (ref 30–100)
WBC # BLD: 4.9 THOU/MM3 (ref 4.8–10.8)

## 2022-03-04 NOTE — TELEPHONE ENCOUNTER
Patient will be using [facility] or [address] [facility]  to have blood work done

## 2022-03-11 ENCOUNTER — PATIENT MESSAGE (OUTPATIENT)
Dept: FAMILY MEDICINE CLINIC | Age: 77
End: 2022-03-11

## 2022-03-11 DIAGNOSIS — J01.90 ACUTE RHINOSINUSITIS: Primary | ICD-10-CM

## 2022-03-11 RX ORDER — FLUTICASONE PROPIONATE 50 MCG
1 SPRAY, SUSPENSION (ML) NASAL DAILY
Qty: 16 G | Refills: 0 | Status: SHIPPED | OUTPATIENT
Start: 2022-03-11 | End: 2022-04-08

## 2022-03-11 RX ORDER — BENZONATATE 100 MG/1
CAPSULE ORAL
Qty: 60 CAPSULE | Refills: 0 | Status: SHIPPED | OUTPATIENT
Start: 2022-03-11 | End: 2022-03-21

## 2022-03-11 RX ORDER — DOXYCYCLINE HYCLATE 100 MG/1
100 CAPSULE ORAL 2 TIMES DAILY
Qty: 20 CAPSULE | Refills: 0 | Status: SHIPPED | OUTPATIENT
Start: 2022-03-11 | End: 2022-03-21

## 2022-04-04 ENCOUNTER — OFFICE VISIT (OUTPATIENT)
Dept: FAMILY MEDICINE CLINIC | Age: 77
End: 2022-04-04
Payer: MEDICARE

## 2022-04-04 VITALS
DIASTOLIC BLOOD PRESSURE: 60 MMHG | HEIGHT: 62 IN | OXYGEN SATURATION: 95 % | TEMPERATURE: 97.9 F | HEART RATE: 83 BPM | RESPIRATION RATE: 12 BRPM | WEIGHT: 208.8 LBS | SYSTOLIC BLOOD PRESSURE: 114 MMHG | BODY MASS INDEX: 38.42 KG/M2

## 2022-04-04 DIAGNOSIS — I10 ESSENTIAL HYPERTENSION: ICD-10-CM

## 2022-04-04 DIAGNOSIS — U07.1 COVID-19: ICD-10-CM

## 2022-04-04 DIAGNOSIS — I50.22 HYPERTENSIVE HEART DISEASE WITH CHRONIC SYSTOLIC CONGESTIVE HEART FAILURE (HCC): ICD-10-CM

## 2022-04-04 DIAGNOSIS — N18.32 STAGE 3B CHRONIC KIDNEY DISEASE (HCC): ICD-10-CM

## 2022-04-04 DIAGNOSIS — I11.0 HYPERTENSIVE HEART DISEASE WITH CHRONIC SYSTOLIC CONGESTIVE HEART FAILURE (HCC): ICD-10-CM

## 2022-04-04 DIAGNOSIS — R79.83 HOMOCYSTEINEMIA: ICD-10-CM

## 2022-04-04 DIAGNOSIS — I50.1 HEART FAILURE, LEFT, WITH LVEF <=30% (HCC): ICD-10-CM

## 2022-04-04 DIAGNOSIS — K90.89 OTHER INTESTINAL MALABSORPTION: ICD-10-CM

## 2022-04-04 DIAGNOSIS — R53.83 OTHER FATIGUE: ICD-10-CM

## 2022-04-04 DIAGNOSIS — E72.11 HOMOCYSTINURIA (HCC): ICD-10-CM

## 2022-04-04 DIAGNOSIS — F41.1 GAD (GENERALIZED ANXIETY DISORDER): ICD-10-CM

## 2022-04-04 DIAGNOSIS — E78.5 DYSLIPIDEMIA: ICD-10-CM

## 2022-04-04 DIAGNOSIS — R73.09 LOW GLUCOSE LEVEL: ICD-10-CM

## 2022-04-04 DIAGNOSIS — M15.9 PRIMARY OSTEOARTHRITIS INVOLVING MULTIPLE JOINTS: Primary | ICD-10-CM

## 2022-04-04 DIAGNOSIS — E66.01 SEVERE OBESITY (BMI 35.0-39.9) WITH COMORBIDITY (HCC): ICD-10-CM

## 2022-04-04 DIAGNOSIS — R10.30 LOWER ABDOMINAL PAIN: ICD-10-CM

## 2022-04-04 DIAGNOSIS — I50.32 CHRONIC HEART FAILURE WITH PRESERVED EJECTION FRACTION (HCC): ICD-10-CM

## 2022-04-04 PROBLEM — I42.9 CARDIOMYOPATHY (HCC): Status: ACTIVE | Noted: 2022-04-04

## 2022-04-04 PROBLEM — J45.909 ASTHMA WITHOUT STATUS ASTHMATICUS: Status: ACTIVE | Noted: 2022-04-04

## 2022-04-04 PROCEDURE — G8427 DOCREV CUR MEDS BY ELIG CLIN: HCPCS | Performed by: FAMILY MEDICINE

## 2022-04-04 PROCEDURE — 1090F PRES/ABSN URINE INCON ASSESS: CPT | Performed by: FAMILY MEDICINE

## 2022-04-04 PROCEDURE — 1123F ACP DISCUSS/DSCN MKR DOCD: CPT | Performed by: FAMILY MEDICINE

## 2022-04-04 PROCEDURE — 99213 OFFICE O/P EST LOW 20 MIN: CPT | Performed by: FAMILY MEDICINE

## 2022-04-04 PROCEDURE — 1036F TOBACCO NON-USER: CPT | Performed by: FAMILY MEDICINE

## 2022-04-04 PROCEDURE — G8399 PT W/DXA RESULTS DOCUMENT: HCPCS | Performed by: FAMILY MEDICINE

## 2022-04-04 PROCEDURE — G8417 CALC BMI ABV UP PARAM F/U: HCPCS | Performed by: FAMILY MEDICINE

## 2022-04-04 PROCEDURE — 4040F PNEUMOC VAC/ADMIN/RCVD: CPT | Performed by: FAMILY MEDICINE

## 2022-04-04 ASSESSMENT — ENCOUNTER SYMPTOMS
BACK PAIN: 1
ROS SKIN COMMENTS: LOSING HAIR
RESPIRATORY NEGATIVE: 1
GASTROINTESTINAL NEGATIVE: 1

## 2022-04-04 NOTE — PROGRESS NOTES
[id number] [facility], [place] [address] [address]  Dept: [phone]  Dept Fax: [phone]  Loc: [phone]      Bry Reveles is a 68 y.o. White female. Opal Carrera  presents to the [place] [address] clinic today for   Chief Complaint   Patient presents with    Follow-up    Discuss Labs    Post-COVID Symptoms     Since Jan 2022    Joint Pain    Back Pain    Neck Pain   , and;   1. Primary osteoarthritis involving multiple joints    2. COVID-19    3. Other fatigue    4. Chronic heart failure with preserved ejection fraction (HCC)    5. Hypertensive heart disease with chronic systolic congestive heart failure ([address]    6. Homocysteinemia    7. Dyslipidemia    8. Lower abdominal pain    9. Essential hypertension    10. Other intestinal malabsorption    11. Heart failure, left, with LVEF <=30% ([address]    12. LYLA (generalized anxiety disorder)    13. Homocystinuria ([address]    14. Stage 3b chronic kidney disease ([address]    15. Severe obesity (BMI 35.0-39. 9) with comorbidity ([address]    16. Low glucose level          I have reviewed Bry Reveles medical, surgical and other pertinent history in detail, and have updated medication and allergy information in the computerized patient record.      Clinical Care Team:     -Referring Provider for today's consult: self  -Primary Care Provider: Radha Gold DO    Medical/Surgical History:   She  has a past medical history of Asthma, mild intermittent, CAD (coronary artery disease), Chronic low back pain, CKD (chronic kidney disease) stage 3, GFR 30-59 ml/min ([facility]), Controlled substance agreement signed; tramadol, Dyslipidemia, LYLA (generalized anxiety disorder), Heart failure with preserved ejection fraction ([address] History of gastroesophageal reflux (GERD), Homocysteinemia, Hypertension, Hypertensive heart disease with congestive heart failure ([address] Left bundle branch block, Obesity (BMI 30-39.9), SUNI on CPAP, Osteoarthritis, Osteopenia, Seasonal allergies, and Vitamin D deficiency. Her  has a past surgical history that includes sinus surgery (1990); Rotator cuff repair (Bilateral, 2006  & 2009); Carpal tunnel release (08/2006); Rotator cuff repair (11/2009); Cardiac catheterization (10/30/14); eye surgery (Left, 04/23/2016); eye surgery (Right, 05/24/2006); Carpal tunnel release (Left, 2009); and Colonoscopy (2007). Family/Social History:     Her family history includes Cancer in her brother and father; Dementia in her brother; Diabetes in her maternal grandmother; Glaucoma in her brother; Heart Disease in her maternal grandmother and mother. She  reports that she is a non-smoker but has been exposed to tobacco smoke. She has never used smokeless tobacco. She reports that she does not drink alcohol and does not use drugs. Medications/Allergies/Immunizations:     Her current medication(s) include   Current Outpatient Medications:     Omega 3 1000 MG CAPS, Take 1 capsule by mouth 4 times daily (before meals and nightly) (Patient taking differently: Take 1 capsule by mouth 4 times daily (before meals and nightly) [id number] [facility]), Disp: 100 capsule, Rfl: 5    fluticasone (FLONASE) 50 MCG/ACT nasal spray, 1 spray by Nasal route daily for 14 days, Disp: 16 g, Rfl: 0    traMADol (ULTRAM) 50 MG tablet, Take 1 tablet by mouth every 12 hours as needed for Pain for up to 90 days. , Disp: 60 tablet, Rfl: 2    Ascorbic Acid (VITAMIN C) 1000 MG tablet, Take 1 tablet by mouth daily, Disp: 30 tablet, Rfl: 0    vitamin D (CHOLECALCIFEROL) 50 MCG (2000 UT) TABS tablet, Take 1 tablet by mouth daily, Disp: 30 tablet, Rfl: 0    zinc 50 MG CAPS, Take 50 mg by mouth daily, Disp: 30 capsule, Rfl: 0    Methylcobalamin 1000 MCG SUBL, Place 1 tablet under the tongue daily, Disp: , Rfl:     Levomefolate Glucosamine (METHYLFOLATE PO), Take 5 mg by mouth daily, Disp: , Rfl:     sertraline (ZOLOFT) 50 MG tablet, Take 1 tablet by mouth once daily, Disp: 90 tablet, Rfl: 3    ENTRESTO 49-51 MG per tablet, TAKE 1 TABLET BY MOUTH TWICE DAILY, Disp: , Rfl:     potassium chloride (KLOR-CON M) 10 MEQ extended release tablet, Take 1 tablet by mouth twice daily, Disp: 180 tablet, Rfl: 3    bumetanide (BUMEX) 2 MG tablet, Take 1 tablet by mouth once daily, Disp: 90 tablet, Rfl: 3    DHEA 10 MG TABS, Take 2 tablets by mouth every morning (before breakfast) MCKENNA 5 mg tab at [employer] , Disp: , Rfl:     acetaminophen (TYLENOL) 500 MG tablet, Take 500 mg by mouth nightly as needed for Pain, Disp: , Rfl:     Vitamins-Lipotropics (BALANCE B-100) TABS, Take 1 tablet by mouth 2 times daily (before meals) , Disp: , Rfl:     Cinnamon 500 MG CAPS, Take 2 capsules by mouth 3 times daily , Disp: , Rfl:     fluticasone (FLONASE) 50 MCG/ACT nasal spray, 1 spray by Each Nare route daily as needed for Rhinitis, Disp: , Rfl:     carvedilol (COREG) 6.25 MG tablet, Take 1 tablet by mouth 2 times daily (with meals), Disp: 60 tablet, Rfl: 3    Cholecalciferol (VITAMIN D3) 2000 units CAPS, Take 1 capsule by mouth daily Double Wednesday and Sunday(2 days will work), Disp: , Rfl:     cetirizine (ZYRTEC) 10 MG tablet, Take 10 mg by mouth daily as needed for Allergies, Disp: , Rfl:     Lactobacillus-Inulin ([facility] DIGESTIVE [facility]) CAPS, Take 1 capsule by mouth daily , Disp: , Rfl:     Magnesium Oxide 500 MG CAPS, Take by mouth daily For more movements, Disp: , Rfl:     nitroGLYCERIN (NITROSTAT) 0.4 MG SL tablet, Place 0.4 mg under the tongue every 5 minutes as needed for Chest pain., Disp: , Rfl:     albuterol-ipratropium (COMBIVENT RESPIMAT)  MCG/ACT AERS inhaler, Inhale 1 puff into the lungs every 6 hours as needed for Wheezing., Disp: , Rfl:     aspirin 81 MG tablet, Take 81 mg by mouth daily. , Disp: , Rfl:     pravastatin (PRAVACHOL) 40 MG tablet, Take 40 mg by mouth daily. , Disp: , Rfl:   Allergies: Duricef [cefadroxil]  Immunizations:   Immunization History   Administered Date(s) Administered    COVID-19, Moderna, Primary or Immunocompromised, PF, 100mcg/0.5mL 03/02/2021, 04/01/2021    Influenza Vaccine, unspecified formulation 12/21/2016    Influenza Virus Vaccine 12/02/2015    Influenza, Quadv, adjuvanted, 65 yrs +, IM, PF (Fluad) 10/06/2020, 10/11/2021    Influenza, Triv, inactivated, subunit, adjuvanted, IM (Fluad 65 yrs and older) 11/29/2018, 12/16/2019    Pneumococcal Conjugate 13-valent ([id number]) 12/02/2015    Pneumococcal Polysaccharide ([id number]) 10/12/2011    Tdap (Boostrix, Adacel) 07/12/2015    Zoster Live (Zostavax) 10/13/2014        History of Present Illness:     Chelle had concerns including Follow-up, Discuss Labs, Post-COVID Symptoms (Since Jan 2022), Joint Pain, Back Pain, and Neck Pain. Perla Reyes  presents to the [address] [place] [address] today for;   Chief Complaint   Patient presents with    Follow-up    Discuss Labs    Post-COVID Symptoms     Since Jan 2022    Joint Pain    Back Pain    Neck Pain   , abnormal labs follow up and these conditions as she  Is looking today for:     1. Primary osteoarthritis involving multiple joints    2. COVID-19    3. Other fatigue    4. Chronic heart failure with preserved ejection fraction (HCC)    5. Hypertensive heart disease with chronic systolic congestive heart failure ([address]    6. Homocysteinemia    7. Dyslipidemia    8. Lower abdominal pain    9. Essential hypertension    10. Other intestinal malabsorption    11. Heart failure, left, with LVEF <=30% ([address]    12. LYLA (generalized anxiety disorder)    13. Homocystinuria ([address]    14. Stage 3b chronic kidney disease ([address]    15. Severe obesity (BMI 35.0-39. 9) with comorbidity ([address]    16. Low glucose level      HPI    Subjective:     Review of Systems   Constitutional: Positive for chills and fatigue. HENT: Positive for dental problem.     Eyes: Positive for visual disturbance (post cataract). Respiratory: Negative. Cardiovascular: Positive for chest pain. Gastrointestinal: Negative. Endocrine: Positive for cold intolerance. Genitourinary: Positive for frequency and menstrual problem. Musculoskeletal: Positive for arthralgias, back pain, gait problem, joint swelling, neck pain and neck stiffness. Skin:        Losing hair   Allergic/Immunologic: Positive for environmental allergies. Neurological: Positive for dizziness, light-headedness and headaches. Hematological: Negative. Psychiatric/Behavioral: Positive for decreased concentration and sleep disturbance. Objective:     /60 (Site: Right Upper Arm, Position: Sitting, Cuff Size: Medium Adult)   Pulse 83   Temp 97.9 °F (36.6 °C) (Temporal)   Resp 12   Ht 5' 2\" (1.575 m)   Wt 208 lb 12.8 oz (94.7 kg)   SpO2 95%   BMI 38.19 kg/m²   Physical Exam  Vitals and nursing note reviewed. Constitutional:       Appearance: Normal appearance. HENT:      Head: Normocephalic. Pulmonary:      Effort: Pulmonary effort is normal.   Neurological:      Mental Status: She is alert. Psychiatric:         Mood and Affect: Mood normal.         Thought Content: Thought content normal.            Laboratory Data:   Lab results were searched in Care Everywhere and/or those brought by the pateint were reviewed today with Opal Carrera and she has a copy of their most recent labs to take home with them as noted below;       Imaging Data:   Imaging Data:       Assessment & Plan:       Impression:  1. Primary osteoarthritis involving multiple joints    2. COVID-19    3. Other fatigue    4. Chronic heart failure with preserved ejection fraction (HCC)    5. Hypertensive heart disease with chronic systolic congestive heart failure ([address]    6. Homocysteinemia    7. Dyslipidemia    8. Lower abdominal pain    9. Essential hypertension    10. Other intestinal malabsorption    11.  Heart failure, left, with LVEF <=30% ([address] 12. LYLA (generalized anxiety disorder)    13. Homocystinuria ([facility]    14. Stage 3b chronic kidney disease ([facility]    15. Severe obesity (BMI 35.0-39. 9) with comorbidity ([facility]    16. Low glucose level      Assessment and Plan:  After reviewing the patients chief complaints, reviewing their labfindings in great detail (with the patient and those accompanying them) which correlate to their chief complaints, symptoms, and or medical conditions; suggestions were made relating to changes in diet and or supplements which may improve the complaints and which will be reflected in their future lab findings; Chief Complaint   Patient presents with    Follow-up    Discuss Labs    Post-COVID Symptoms     Since Jan 2022    Joint Pain    Back Pain    Neck Pain   ;    Plans for the next visits:  - Abnormal and non-optimal Labs were ordered today to be repeated in the next 120-365 days to assess changes from adjustments in nutrition and or nutrients. - Patient instructed when having a blood draw to ask the  to divide their lab draws into multiple draws over several days if not feeling good at the time of the lab draw or if either prefers to do several smaller blood draws over several days  -Patient instructed to check with insurer before each lab draw and to go to the lab which the insurer directs them for the most cost effective lab draw with the least patient's cost  - Forrestine Plaster  will be scheduled subsequent to those results. Karen Zayas will bring in her drink, food, supplement log to her next visit    Chronic Problems Addressed on this Visit:                                   1.  Intensity of Service; Uncontrolled items at this visit; Chief Complaint   Patient presents with    Follow-up    Discuss Labs    Post-COVID Symptoms     Since Jan 2022    Joint Pain    Back Pain    Neck Pain   ; Improved items at this visit and Stable items were discussed at this visit;  2.  Patients food, drinks, supplements and symptoms were reviewed with the patient,       - Ziatine Dessert will bring food, drink, supplements and symptoms log to next visit for inclusion in their record      - 75 better food list reviewed & given to patient with the omega 6 food list to avoid      - The 52 Latex foods list was reviewed and given to the patients with the information on carrageenan         - Gluten in corn and oats abstracts sheet reviewed and given to the patient today   3. Greater than 20 minutes time was spent with the patient face to face on this visit; of which >50% was for counseling and coordination of care, as well as the time spent before and after the visit reviewing the chart, documenting the encounter, reviewing labs,reports, NIH listed studies, making phone calls, etc.      Patients food and drinks were reviewed with the patient,   - They will bring a food drink symptom log to future visits for inclusion in their record    - 75 better food list reviewed & given to patient along with the omega 6 food list to avoid      - Gluten in corn and oats abstracts sheet reviewed and given to the patient today    - 23 Foods containing Latex-like proteins was reviewed and copy to be taken if desired     - Nutrient Supplements list provided and copyto be taken if desired    - [employer] web site offered to patient to review at their convenience by staff with login information    Note:  I have discussed with the patient that with all nutraceuticals, there is often mixed data and emerging research which needs to be monitored; as well as an array of NIH fact sheets on nutrients and supplements, available at www.nih,issue plus [employer] plus www.lpi,org. If I have recommended cinnamon at the request of this patient to assist them in control of their blood sugar, triglyceride, and/or weight issues.   I discussed that the patient's clinical use of cinnamon bark, calcium, magnesium, Vitamin D, and pharmaceutical 1000 mg / serving of 2 capsules,    some brands have 1000 mg caps with the undesireable chromium extract  - Calcium carbonate/citrate, magnesium oxide/citrate, Vit D-3 as 3-4 tabs/caps/serving     Some Local Brands may contain Zinc which is acceptable for the first bottle or two  - Magnesium oxide 250 mg tabs for those having < 2 bowel movements daily  - Magnesium citrate 200 mg if having > 2 bowel movements/day  - Centrum Silver or look-a-like for most patients, Centrum plain or look-a-like with iron  - Vitamin D-3 comes as 1,000 IU or 2,000 IU or 5,000 IU gel caps or Liquid drops but keep Vitamin D levels <50 but >40     Some brands containing or derived from soy oil or corn oil are OK if not allergic to soy  - Elemental Iron 65 mg tabs at bedtime is available over the counter if need more iron     Usually turns bowel movements grey, green, or black but not a concern  - Apricot Kernel Oil (by Now) for dry skin sensitive perineal or perianal area skin    Nutrients for ongoing use by Mail order for less expense from [employer] ;  - Strength Fish Oil , 240 Softgels Item #094614  -B-100 time released balanced B complex Item #293250  - Cinnamon bark 500 mg without Chromium or extract Item #059713  - Calcium carbonate 1000 mg, Magnesium oxide 500 mg, Vit D-3 400 IU Item #009949  - Magnesium oxide 500 mg tabs Item #309477 if less than 2 bowel movements daily  - [employer] Item #927861 for most patients, One Daily Item #071049 with iron  - Vit D 3  1,000 Item #861935      2,000 IU Item #053464   Item #313310     Some brands containing orderived from soy oil or corn oil are OK if not allergic to soy    Nutrients for Special Needs by Mail order for less expense from www. [employer]itan.com;  -Elemental Iron 65 mg tabs Item #979871 if need more iron for low iron on labs    Usually turns bowel movements grey, green or black but not a concern  - Time released Niacin 250 mg Item #936527 for cold intolerance, low libido or impotence  - DHEA 50 mg Item #345343 for improving DHEA levels on labs if having Fatigue    If stools too loose substitute for your Magnesium oxide using;   Magnesium citrate 200 mg tabs (NOT liquid) at [employer]   Magnesium gluconate 550 mg by [facility] at [employer] or [employer]. com  Magnesium chloride foot soaks or body sprays  www.[employer]. [employer]   Magnesium chloride flakes 14.99 Item #: [id number] if back-ordered, get spray  Magnesium threonate, Magtein also helps mental clarity and sleep    Food Drink Symptom Log;  I asked this patient to track these items and any other symptoms on their list on a weekly basis to documenttheir progress or lack of same. This can be done on the symptom tracking sheet I gave them at today's visit but looks like this:                                                      Rate on scale of 0-10 with zero = not noticeable  Symptom:                            Week 1               2                 3                 4               Etc            Hair loss    Foot cramps    Paresthesia    Aches    IBS (irritable bowel)    Constipation    Diarrhea  Nocturia (up to bathroom at night)    Fatigue/Energy level  Stress      On the other side of the sheet they can track their food, drink, environment, activity, symptoms etc      Avoiding Latex-like proteins in my foods; Avocados, Bananas, Celery, Figs & Kiwi proteins have latex-like proteins to inflame our immune systems, plus 47 more foods  How Can I Have A Latex Allergy? Eating foods with latex-like protein exposes us to latex allergies. Our body cannot tell the differencebetween these latex-like proteins and latex from rubber products since many people are allergic to fruit, vegetables and latex. Read labels on pre-packaged foods.  This list to avoid is only a guide if you are known allergicto latex or have a latex rash on your chin, cheeks and lines on your neck and chest. The amount of latex is different in each food product or fruit variety. Avoid out of Season if not grown locally:   Melon, Nectarine, Papaya, Cherry, Passion fruit, Plum, Chestnuts, and Tomato. Avocado, Banana, Celery, Figs, and Kiwi always contain Latex-like protein. Whats in Season? Strawberries taste better in June than December because June is strawberry season so buy locally grown produce \"in season\" for the best flavor, cost, and less Latex. Locally grown produce not only tastes great but also requires little or no ethylene exposure in food distribution so has less latex content. Out of season: use canned, frozen, or dried since those are processed ripe and latex content is lower!!!     Month     [place] Locally Grown Produce  January, February, March: use canned, frozen or dried fruits since lower in latex  April: asparagus, radishes  May: asparagus, broccoli, green onions, greens, peas, radishes, rhubarb  June: asparagus, beets, beans, broccoli, cabbage, cantaloupe, carrots, green onions, greens, lettuce, onions, parsley, peas, radishes, rhubarb, strawberries, watermelons  July: beans, beets, blueberries, broccoli, cabbage, cantaloupe, carrots, cauliflower, celery, cucumbers, eggplant, grapes, green onions, greens, lettuce, onions, parsley, peas, peaches, bell peppers, potatoes, radishes, summer raspberries, squash, sweetcorn, tomatoes, turnips, watermelons  August: apples, beans, beets, blueberries, cabbage, cantaloupe, carrots, cauliflower, celery, cucumbers, eggplant, grapes, green onions, greens, lettuce, onions, parsley, peas, peaches, pears, bell peppers, potatoes, radishes, squash, sweet corn, tomatoes, turnips, watermelons  September: apples, beans, beets, blueberries, cabbage, cantaloupe, carrots, cauliflower, celery, cucumbers, eggplant, grapes, green onions, greens, lettuce, onions, parsley, peas, peaches, pears, bell peppers, plums, potatoes, pumpkins, radishes, fall red raspberries, squash, sweet corn, tomatoes, turnips, watermelons  October: apples, beets, broccoli, cabbage, carrots, cauliflower, celery, green onions, greens, lettuce, parsley, peas, pears, potatoes, pumpkins, radishes, fall red raspberries, squash, turnips  November: broccoli, cabbage, carrots, parsley, pears, peas  December: use canned, frozen or dried fruits since lower in latex    Upto half of latex-sensitive patients show allergic reactions to fruits (avocados, bananas, kiwifruits, papayas, peaches),   Annals of Allergy, 1994. These plants contain the same proteins that are allergens in latex. People with fruit allergies should warn physicians before undergoing procedures which may cause anaphylactic reaction if in contact with latex gloves. Some of the common foods with defined cross-reactivity to latex are avocado, banana, kiwi, chestnut, raw potato, tomato, stone fruits (e.g., peach, cherry), hazelnut, melons, celery, carrot, apple, pear, papaya, and almond. Foods with less well-defined cross-reactivity to latex are peanuts, peppers, citrus fruits, coconut, pineapple, john, fig, passion fruit, Ugli fruit, and grape. This fruit/latex cross-reactivity is worsened by ethylene, a gas used to hasten commercial ripening. In nature, plants produce low levels of the hormone ethylene, which regulates germination, flowering, and ripening. Forced ripening by high ethylene concentrations, plants produce allergenic wound-repair proteins, which are similar to wound-repair proteins made during the tapping of rubber trees. Sensitive individuals who ingest the fruit get a higher dose and worse reaction. Some people may even first become sensitized to latex through fruit. Can food processing increase the concentrations of allergenic proteins? Latex-sensitized children (and adults) in [place] often experience allergic reactions after eating bananas ripened artificially with ethylene.  In the [place], food distribution centers treat unripe bananas and other produce with ethylene to ripen; not commonly done in [facility] since fruit is tree-ripened there. Does treatment of food with ethylene induce banana proteins that cross-react with latex? (Blayne et al.)    References:   Latex in Foods Allergy, http://ehp.niehs.nih.gov/members/2003/5811/5811.html    Search web for [employer] in Season \" for where you live or are at the time you food shop   Management of Latex, ://medicalcenter. [facility].edu/  search for nih, latex-like proteins in foods

## 2022-04-05 VITALS
SYSTOLIC BLOOD PRESSURE: 100 MMHG | HEART RATE: 61 BPM | BODY MASS INDEX: 38.59 KG/M2 | WEIGHT: 211 LBS | DIASTOLIC BLOOD PRESSURE: 52 MMHG

## 2022-04-05 RX ORDER — CHLORAL HYDRATE 500 MG
CAPSULE ORAL
COMMUNITY
End: 2022-04-08

## 2022-04-05 RX ORDER — SACUBITRIL AND VALSARTAN 24; 26 MG/1; MG/1
TABLET, FILM COATED ORAL
COMMUNITY

## 2022-04-05 RX ORDER — PRAVASTATIN SODIUM 40 MG
TABLET ORAL
COMMUNITY
End: 2022-04-08

## 2022-04-05 RX ORDER — LANOLIN ALCOHOL/MO/W.PET/CERES
CREAM (GRAM) TOPICAL
COMMUNITY

## 2022-04-05 RX ORDER — POTASSIUM CHLORIDE 750 MG/1
TABLET, EXTENDED RELEASE ORAL
COMMUNITY
End: 2022-04-08

## 2022-04-05 RX ORDER — BUMETANIDE 2 MG/1
TABLET ORAL
COMMUNITY
End: 2022-04-08

## 2022-04-05 RX ORDER — AMOXICILLIN 500 MG
CAPSULE ORAL
COMMUNITY

## 2022-04-05 RX ORDER — TRAMADOL HYDROCHLORIDE 50 MG/1
TABLET ORAL
COMMUNITY
End: 2022-04-08

## 2022-04-05 RX ORDER — NITROGLYCERIN 0.4 MG/1
TABLET SUBLINGUAL
COMMUNITY

## 2022-04-05 RX ORDER — MELATONIN 3 MG
TABLET ORAL
COMMUNITY
End: 2022-04-08

## 2022-04-05 RX ORDER — ACETAMINOPHEN 325 MG/1
TABLET ORAL
COMMUNITY
End: 2022-04-08

## 2022-04-05 RX ORDER — CARVEDILOL 6.25 MG/1
TABLET ORAL
COMMUNITY
End: 2022-04-08

## 2022-04-05 RX ORDER — CYANOCOBALAMIN (VITAMIN B-12) 3000MCG/ML
DROPS SUBLINGUAL
COMMUNITY

## 2022-04-05 RX ORDER — BACLOFEN 20 MG
TABLET ORAL
COMMUNITY
End: 2022-04-08

## 2022-04-05 RX ORDER — CETIRIZINE HYDROCHLORIDE 10 MG/1
TABLET ORAL
COMMUNITY
End: 2022-04-08

## 2022-04-07 SDOH — HEALTH STABILITY: PHYSICAL HEALTH: ON AVERAGE, HOW MANY DAYS PER WEEK DO YOU ENGAGE IN MODERATE TO STRENUOUS EXERCISE (LIKE A BRISK WALK)?: 3 DAYS

## 2022-04-07 SDOH — HEALTH STABILITY: PHYSICAL HEALTH: ON AVERAGE, HOW MANY MINUTES DO YOU ENGAGE IN EXERCISE AT THIS LEVEL?: 40 MIN

## 2022-04-07 ASSESSMENT — PATIENT HEALTH QUESTIONNAIRE - PHQ9
SUM OF ALL RESPONSES TO PHQ QUESTIONS 1-9: 0
2. FEELING DOWN, DEPRESSED OR HOPELESS: 0
SUM OF ALL RESPONSES TO PHQ QUESTIONS 1-9: 0
SUM OF ALL RESPONSES TO PHQ QUESTIONS 1-9: 0
SUM OF ALL RESPONSES TO PHQ9 QUESTIONS 1 & 2: 0
1. LITTLE INTEREST OR PLEASURE IN DOING THINGS: 0
SUM OF ALL RESPONSES TO PHQ QUESTIONS 1-9: 0

## 2022-04-07 ASSESSMENT — LIFESTYLE VARIABLES
HOW OFTEN DO YOU HAVE A DRINK CONTAINING ALCOHOL: 1
HOW OFTEN DO YOU HAVE SIX OR MORE DRINKS ON ONE OCCASION: 1
HOW OFTEN DO YOU HAVE A DRINK CONTAINING ALCOHOL: NEVER

## 2022-04-08 PROBLEM — I42.9 CARDIOMYOPATHY (HCC): Status: RESOLVED | Noted: 2022-04-04 | Resolved: 2022-04-08

## 2022-04-08 PROBLEM — J45.909 ASTHMA WITHOUT STATUS ASTHMATICUS: Status: RESOLVED | Noted: 2022-04-04 | Resolved: 2022-04-08

## 2022-04-08 NOTE — PROGRESS NOTES
Chief Complaint   Patient presents with    Medicare AWV    Follow-up     Chronic issues as noted below    Anemia       History obtained from the patient. SUBJECTIVE:  Lety Dawson is a 68 y.o. female that presents today for     -CHF PRIOR VISIT:   Patient admitted to [facility] from 5/17 to 5/22 for new onset HF. D/c summary:   None at time of my evalation    Last cardio PN:  Plan:   Patient with non ischemic chf   continue medical rx    will need life vest placement    ambulate    will see at the office few weeks       Since discharge has done well. No SOB, orthopnea or pND. Has life vest. Has not done off. Tolerating meds. Has f/u with Jose berrios, next wk. Doing daily wts, stable. UPDATE PRIOR VISIT:   Overall doing better  Following with Jose  No CP/SOb  Pt told EF better, records pending, and off life vest.   Still having occ palp    UPDATE TODAY:   Repeat EF 55+%  No CP/sOB  On Entresto yet as well as BB and statin  Seeing Jose   Has f/u scheduled  wts steady  Feeling well      -HTN/CKD3:    HPI:     Taking meds as prescribed ?: yes  Tolerating well ?: yes  Side Effects ?: denies  BP at home ?: <140/90  Working on TLCS ?: yes  Chest Pain/SOB/Palpitations? denies    BP Readings from Last 3 Encounters:   04/11/22 112/70   04/04/22 114/60   04/05/22 (!) 100/52       -Homocystine PRIOr VISIT: noted on labs a few years ago. Not treated as far as she knows. Las labs 2016. UPDATE TODAY:   on folate   Has f/u labs with Jacqueline      -HLD:    HPI:  Labs UTD    Taking meds as prescribed ?: yes  Tolerating well ?: yes  Side Effects ?: denies  Muscle Pain?: denies  Working on TLCS ?: yes      -Chronic pain: has back issues and OA: uses tramadol. 60 tabs per 30 days. Was getting from sharifa Vega PCP. Had asked me to take over. OAARS reviewed and appropriate. UTD on refills.         -LYLA: on zoloft, works well. No sI/HI.         -Asthma: On prn combivent. Works well. Uses once per month.  PFT 2017 with min obstruction      -Osteopenia:  Noted on dexa  Stable  No falls or fxrs  On lashell with D  Due for f/u dexa      -Anemia:  Noted on labs by another provide  Mild  No f/u done  Denies bleeding, melena or hematochzeia    Age/Gender Health Maintenance    Lipid -   Lab Results   Component Value Date    CHOL 181 10/27/2021    CHOL 178 10/21/2020    CHOL 183 01/10/2020     Lab Results   Component Value Date    TRIG 103 10/27/2021    TRIG 98 10/21/2020    TRIG 187 01/10/2020     Lab Results   Component Value Date    HDL 77 10/27/2021    HDL 67 10/21/2020    HDL 69 01/10/2020     Lab Results   Component Value Date    LDLCALC 83 10/27/2021    [address] 91 10/21/2020    LDLCALC 77 01/10/2020     No results found for: LABVLDL, VLDL  No results found for: CHOLHDLRATIO    TSH -   Lab Results   Component Value Date    TSH 3.520 10/27/2021       DM Screen -   Lab Results   Component Value Date    GLUCOSE 93 03/04/2022    GLUCOSE 95 10/08/2019       Colon Cancer Screening - neg with cecal ulcer AUG 2010, repeat 5 years per Colton Noble d/t fam hx; pt plans to call Dr. Pedrito Berger SEPT 2019/JAN 2020/OCT 2020/APR 2021/OCT 2021/APR 2022  Lung Cancer Screening (Age 54 to [de-identified] with 30 pack year hx, current smoker or quit within past 15 years) - never smoker    Tetanus - UTD July 2015  Influenza Vaccine - UTD OCT 2021  Pneumonia Vaccine - UTD PPV 23 and PCV 13  Zostavax - Zostavax OCT 2014   Shingrix - to get at pharmacy per medicare rules    Breast Cancer Screening - NEG MARCH 2021    Osteoporosis Screening - July 2017:   FRAX 10 year probability of major osteoporotic fracture is 9.9%    and hip fracture is 1.7%.         IMPRESSION: OSTEOPENIA   Patient is at medium risk for fracture.  Patient consult    w/primary care provider is recommended.       AUG 2019  BMD change vs previous is -4.0% for the hips.  BMD change vs    previous is 3.0% for the spine.       FRAX 10 year probability of major osteoporotic fracture is 11%    and hip fracture is 2.3%.         IMPRESSION: OSTEOPENIA   Patient is at medium risk for fracture.          Specialist List:  Cardio: Rico Zaidi  CPAP/Pulm: Christen  Integrative Med: Piper Valentine      Current Outpatient Medications   Medication Sig Dispense Refill    Ascorbic Acid (VITAMIN C WITH VICTOR M HIPS) 500 MG tablet TAKE 2 TABLETS BY MOUTH ONCE DAILY      B Complex-Biotin-FA (B-COMPLEX PO)       albuterol-ipratropium (COMBIVENT RESPIMAT)  MCG/ACT AERS inhaler 1 puff      sacubitril-valsartan (ENTRESTO) 24-26 MG per tablet 1 Tablet      Omega-3 Fatty Acids (FISH OIL) 1200 MG CAPS 1 capsule      folic acid (FOLVITE) 891 MCG tablet 1 tablet      LACTOBACILLUS PO as directed      nitroGLYCERIN (NITROSTAT) 0.4 MG SL tablet       Cyanocobalamin (VITAMIN B12) 3000 MCG/ML LIQD as directed      traMADol (ULTRAM) 50 MG tablet Take 1 tablet by mouth every 12 hours as needed for Pain for up to 90 days. 60 tablet 2    Methylcobalamin 1000 MCG SUBL Place 1 tablet under the tongue daily      Levomefolate Glucosamine (METHYLFOLATE PO) Take 5 mg by mouth daily      sertraline (ZOLOFT) 50 MG tablet Take 1 tablet by mouth once daily 90 tablet 3    ENTRESTO 49-51 MG per tablet TAKE 1 TABLET BY MOUTH TWICE DAILY      potassium chloride (KLOR-CON M) 10 MEQ extended release tablet Take 1 tablet by mouth twice daily 180 tablet 3    bumetanide (BUMEX) 2 MG tablet Take 1 tablet by mouth once daily 90 tablet 3    DHEA 10 MG TABS Take 2 tablets by mouth every morning (before breakfast) MCKENNA 5 mg tab at [place]       Vitamins-Lipotropics (BALANCE B-100) TABS Take 1 tablet by mouth 2 times daily (before meals)       Omega 3 1000 MG CAPS Take 1 capsule by mouth 4 times daily (before meals and nightly) (Patient taking differently: Take 1 capsule by mouth 4 times daily (before meals and nightly) [id number] [facility]) 100 capsule 5    Cinnamon 500 MG CAPS Take 2 capsules by mouth 3 times daily       carvedilol (COREG) 6.25 MG tablet Take 1 tablet by mouth 2 times daily (with meals) 60 tablet 3    Cholecalciferol (VITAMIN D3) 2000 units CAPS Take 1 capsule by mouth daily Double Wednesday and Sunday(2 days will work)      cetirizine (ZYRTEC) 10 MG tablet Take 10 mg by mouth daily as needed for Allergies      Lactobacillus-Inulin ([address]) CAPS Take 1 capsule by mouth daily       nitroGLYCERIN (NITROSTAT) 0.4 MG SL tablet Place 0.4 mg under the tongue every 5 minutes as needed for Chest pain.  aspirin 81 MG tablet Take 81 mg by mouth daily.  pravastatin (PRAVACHOL) 40 MG tablet Take 40 mg by mouth daily. No current facility-administered medications for this visit. No orders of the defined types were placed in this encounter. All medications reviewed and reconciled, including OTC and herbal medications. Updated list given to patient. Patient Active Problem List    Diagnosis Date Noted    Homocystinuria ([facility] 04/04/2022    Tonsillar mass 10/06/2020    CKD (chronic kidney disease) stage 3, GFR 30-59 ml/min ([facility])     Heart failure with preserved ejection fraction ([facility] 05/20/2019     Previously <30%, recovered.  Hypertensive heart disease with congestive heart failure ([facility] 05/17/2019    Controlled substance agreement signed; tramadol 03/27/2019    Asthma, mild intermittent     History of gastroesophageal reflux (GERD)     SUNI on CPAP      wears CPAP nightly      Osteopenia     Seasonal allergies     Osteoarthritis     CAD (coronary artery disease)      mild disease in small vessels per cath 2014. Follows with Jose      Left bundle branch block     Hypertension     Dyslipidemia     Obesity (BMI 30-39. 9)     Vitamin D deficiency 05/24/2016    Homocysteinemia 04/18/2016    Chronic low back pain     LYLA (generalized anxiety disorder)        Past Medical History:   Diagnosis Date    Asthma, mild intermittent     CAD (coronary artery disease)     mild disease in small vessels per cath 2014. Follows with Jeniffer Aguilar    Chronic low back pain     CKD (chronic kidney disease) stage 3, GFR 30-59 ml/min ([facility])     Controlled substance agreement signed; tramadol 03/27/2019    Dyslipidemia     LYLA (generalized anxiety disorder)     Heart failure with preserved ejection fraction ([facility] 05/20/2019    Previously <30%, recovered.  History of gastroesophageal reflux (GERD)     no longer on medication    Homocysteinemia 04/18/2016    Hypertension     Hypertensive heart disease with congestive heart failure ([facility] 05/17/2019    Left bundle branch block     Obesity (BMI 30-39. 9)     SUNI on CPAP     wears CPAP nightly    Osteoarthritis     Osteopenia     Seasonal allergies     Vitamin D deficiency 05/24/2016         Past Surgical History:   Procedure Laterality Date    CARDIAC CATHETERIZATION  10/30/14    Dr. Do Del Angel  08/2006    Ashanti Woodson Left 2009    COLONOSCOPY  2007    EYE SURGERY Left 04/23/2016    Dr. Bebeto Gardiner Right 05/24/2006    Dr. Yenny Pastor Bilateral 2006  & 2009    ROTATOR CUFF REPAIR  11/2009    SINUS SURGERY  1990         Allergies   Allergen Reactions    Duricef [Cefadroxil]          Social History     Tobacco Use    Smoking status: Passive Smoke Exposure - Never Smoker    Smokeless tobacco: Never Used   Substance Use Topics    Alcohol use: No         Family History   Problem Relation Age of Onset    Heart Disease Mother     Cancer Father     Cancer Brother     Dementia Brother     Diabetes Maternal Grandmother     Heart Disease Maternal Grandmother     Glaucoma Brother          I have reviewed the patient's past medical history, past surgical history, allergies, medications, social and family history and I have made updates where appropriate.       Review of Systems  Positive responses are highlighted in bold    Constitutional:  Fever, Chills, Night Sweats, Fatigue, Unexpected changes in weight  HENT:  Ear pain, Tinnitus, Nosebleeds, Trouble swallowing, Hearing loss, Sore throat  Cardiovascular:  Chest Pain, Palpitations, Orthopnea, Paroxysmal Nocturnal Dyspnea  Respiratory:  Cough, Wheezing, Shortness of breath, Chest tightness, Apnea  Gastrointestinal:  Nausea, Vomiting, Diarrhea, Constipation, Heartburn, Blood in stool  Genitourinary:  Difficulty or painful urination, Flank pain, Change in frequency, Urgency  Skin:  Color change, Rash, Itching, Wound  Musculoskeletal:  Joint pain, Back pain, Gait problems, Joint swelling, Myalgias  Neurological:  Dizziness, Headaches, Presyncope, Numbness, Seizures, Tremors  Endocrine:  Heat Intolerance, Cold Intolerance, Polydipsia, Polyphagia, Polyuria      PHYSICAL EXAM:  Vitals:    04/11/22 1319   BP: 112/70   Pulse: 68   Resp: 12   Temp: 98.2 °F (36.8 °C)   TempSrc: Oral   SpO2: 97%   Weight: 210 lb (95.3 kg)   Height: 5' 1\" (1.549 m)     Body mass index is 39.68 kg/m². VS Reviewed  General Appearance: A&O x 3, No acute distress,well developed and well- nourished  Eyes: pupils equal, round, and reactive to light, extraocular eye movements intact, conjunctivae and eye lids without erythema  ENT: external ear and ear canal clear bilaterally, TMs intact and regular, nose without deformity, nasal mucosa and turbinates normal without polyps, oropharynx normal, dentition is normal for age. Neck: supple and non-tender without mass, no thyromegaly or thyroid nodules, no cervical lymphadenopathy  Pulmonary/Chest: clear to auscultation bilaterally- no wheezes, rales or rhonchi, normal air movement, no respiratory distress or retractions  Cardiovascular: S1 and S2 auscultated w/ RRR. No murmurs, rubs, clicks, or gallops, distal pulses intact. Abdomen: soft, non-tender, non-distended, bowel sounds physiologic,  no rebound or guarding, no masses or hernias noted. Liver and spleen without enlargement.    Extremities: no cyanosis, clubbing or edema of the lower extremities. +2 PT/DP bilaterally. Musculoskeletal: No joint swelling or gross deformity   Skin: warm and dry, no rash or erythema      ASSESSMENT & PLAN  1. Chronic heart failure with preserved ejection fraction (HCC)    Doing well, EF improved  con't current meds  On BB, entresto and pravachol  Daily wts  bumex  F/u CardioJose    2. Hypertensive heart disease with chronic systolic congestive heart failure ([facility]      3. Coronary artery disease involving native coronary artery of native heart without angina pectoris    As above  Stable  con't tertiary prevention    4. Essential hypertension    Stable  con't Entrestro and coreg    5. Stage 3b chronic kidney disease ([facility]    As above    6. Homocysteinemia    con't folate  F/u integrative med    7. Dyslipidemia    Stable  con't pravachol  Labs UTD    8. Chronic bilateral low back pain without sciatica    Stable  Tramadol works well  Does 60 tabs per 30 days  OAARS is appropriate  Controlled substance agreement signed  May call for RF when needed    9. Primary osteoarthritis involving multiple joints    As above    10. Controlled substance agreement signed; tramadol    As above    11. LYLA (generalized anxiety disorder)    Stable  Doing well  con't zolof    12. Mild intermittent asthma without complication    Stable  con't prn combivent  PFT UTD    13. Osteopenia of multiple sites    Stable  con't lashell with D  DEXA ordered    - DEXA BONE DENSITY AXIAL SKELETON; Future    14. Post-menopause    - DEXA BONE DENSITY AXIAL SKELETON; Future    15. Normocytic anemia    Mild anemia  Will get f/u cbc 4 wks  If still low, depending on how low, will do further w/u    - CBC with Auto Differential; Future    16. Encounter for screening mammogram for malignant neoplasm of breast    - SAURABH EMILY DIGITAL SCREEN BILATERAL; Future      DISPOSITION    Return in about 6 months (around 10/11/2022) for follow-up on chronic medical conditions, sooner as needed.     Cindy Mcmillan released without restrictions. Future Appointments   Date Time Provider Harmony Raya   10/4/2022 11:30 AM Cedric Mendiola MD [facility]     PATIENT COUNSELING    Barriers to learning and self management: none    Discussed use, benefit, and side effects of prescribed medications. Barriers to medication compliance addressed. All patient questions answered. Pt voiced understanding. Electronically signed by Johnna Murguia DO on 4/11/2022 at 1:41 PM        Medicare Annual Wellness Visit    Lien Velasquez is here for Medicare AWV, Follow-up (Chronic issues as noted below), and Anemia    Assessment & Plan   Medicare annual wellness visit, subsequent     Recommendations for Preventive Services Due: see orders and patient instructions/AVS.  Recommended screening schedule for the next 5-10 years is provided to the patient in written form: see Patient Instructions/AVS.     Return in about 6 months (around 10/11/2022) for follow-up on chronic medical conditions, sooner as needed. Subjective     Patient's complete Health Risk Assessment and screening values have been reviewed and are found in Flowsheets. The following problems were reviewed today and where indicated follow up appointments were made and/or referrals ordered. Positive Risk Factor Screenings with Interventions:               Opioid Risk: (Low risk score <55) Opioid risk score: 11    Patient is low risk for opioid use disorder or overdose. Last PDMP Kya Dumont as Reviewed:  Review User Review Instant Review Result   Tiana Dinero 2/10/2022 12:05 PM Reviewed PDMP [1]     Last Controlled Substance Monitoring Documentation      Patient Message from 2/10/2022 in [address] [place] [address]  Family Medicine   Periodic Controlled Substance Monitoring Possible medication side effects, risk of tolerance/dependence & alternative treatments discussed., No signs of potential drug abuse or diversion identified.  filed at 02/10/2022 [address] [place] [address] Habits/Nutrition:     Physical Activity: Insufficiently Active    Days of Exercise per Week: 3 days    Minutes of Exercise per Session: 40 min     Have you lost any weight without trying in the past 3 months?: No  Body mass index: (!) 39.67  Have you seen the dentist within the past year?: Yes    Health Habits/Nutrition Interventions:  · Nutritional issues:  educational materials for healthy, well-balanced diet provided             Objective   Vitals:    04/11/22 1319   BP: 112/70   Pulse: 68   Resp: 12   Temp: 98.2 °F (36.8 °C)   TempSrc: Oral   SpO2: 97%   Weight: 210 lb (95.3 kg)   Height: 5' 1\" (1.549 m)      Body mass index is 39.68 kg/m². Allergies   Allergen Reactions    Duricef [Cefadroxil]      Prior to Visit Medications    Medication Sig Taking? Authorizing Provider   Ascorbic Acid (VITAMIN C WITH VICTOR M HIPS) 500 MG tablet TAKE 2 TABLETS BY MOUTH ONCE DAILY Yes Historical Provider, MD   B Complex-Biotin-FA (B-COMPLEX PO)  Yes Historical Provider, MD   albuterol-ipratropium (COMBIVENT RESPIMAT)  MCG/ACT AERS inhaler 1 puff Yes Historical Provider, MD   sacubitril-valsartan (ENTRESTO) 24-26 MG per tablet 1 Tablet Yes Historical Provider, MD   Omega-3 Fatty Acids (FISH OIL) 1200 MG CAPS 1 capsule Yes Historical Provider, MD   folic acid (FOLVITE) 202 MCG tablet 1 tablet Yes Historical Provider, MD   LACTOBACILLUS PO as directed Yes Historical Provider, MD   nitroGLYCERIN (NITROSTAT) 0.4 MG SL tablet  Yes Historical Provider, MD   Cyanocobalamin (VITAMIN B12) 3000 MCG/ML LIQD as directed Yes Historical Provider, MD   traMADol (ULTRAM) 50 MG tablet Take 1 tablet by mouth every 12 hours as needed for Pain for up to 90 days.  Yes Dae Smart, DO   Methylcobalamin 1000 MCG SUBL Place 1 tablet under the tongue daily Yes Historical Provider, MD   Levomefolate Glucosamine (METHYLFOLATE PO) Take 5 mg by mouth daily Yes Historical Provider, MD   sertraline (ZOLOFT) 50 MG tablet Take 1 tablet by

## 2022-04-11 ENCOUNTER — OFFICE VISIT (OUTPATIENT)
Dept: FAMILY MEDICINE CLINIC | Age: 77
End: 2022-04-11
Payer: MEDICARE

## 2022-04-11 VITALS
RESPIRATION RATE: 12 BRPM | SYSTOLIC BLOOD PRESSURE: 112 MMHG | WEIGHT: 210 LBS | OXYGEN SATURATION: 97 % | TEMPERATURE: 98.2 F | HEART RATE: 68 BPM | DIASTOLIC BLOOD PRESSURE: 70 MMHG | HEIGHT: 61 IN | BODY MASS INDEX: 39.65 KG/M2

## 2022-04-11 DIAGNOSIS — Z78.0 POST-MENOPAUSE: ICD-10-CM

## 2022-04-11 DIAGNOSIS — D64.9 NORMOCYTIC ANEMIA: ICD-10-CM

## 2022-04-11 DIAGNOSIS — M85.89 OSTEOPENIA OF MULTIPLE SITES: ICD-10-CM

## 2022-04-11 DIAGNOSIS — E78.5 DYSLIPIDEMIA: ICD-10-CM

## 2022-04-11 DIAGNOSIS — I50.32 CHRONIC HEART FAILURE WITH PRESERVED EJECTION FRACTION (HCC): ICD-10-CM

## 2022-04-11 DIAGNOSIS — I50.22 HYPERTENSIVE HEART DISEASE WITH CHRONIC SYSTOLIC CONGESTIVE HEART FAILURE (HCC): ICD-10-CM

## 2022-04-11 DIAGNOSIS — J45.20 MILD INTERMITTENT ASTHMA WITHOUT COMPLICATION: ICD-10-CM

## 2022-04-11 DIAGNOSIS — I25.10 CORONARY ARTERY DISEASE INVOLVING NATIVE CORONARY ARTERY OF NATIVE HEART WITHOUT ANGINA PECTORIS: ICD-10-CM

## 2022-04-11 DIAGNOSIS — I10 ESSENTIAL HYPERTENSION: ICD-10-CM

## 2022-04-11 DIAGNOSIS — Z12.31 ENCOUNTER FOR SCREENING MAMMOGRAM FOR MALIGNANT NEOPLASM OF BREAST: ICD-10-CM

## 2022-04-11 DIAGNOSIS — I11.0 HYPERTENSIVE HEART DISEASE WITH CHRONIC SYSTOLIC CONGESTIVE HEART FAILURE (HCC): ICD-10-CM

## 2022-04-11 DIAGNOSIS — F41.1 GAD (GENERALIZED ANXIETY DISORDER): ICD-10-CM

## 2022-04-11 DIAGNOSIS — M15.9 PRIMARY OSTEOARTHRITIS INVOLVING MULTIPLE JOINTS: ICD-10-CM

## 2022-04-11 DIAGNOSIS — G89.29 CHRONIC BILATERAL LOW BACK PAIN WITHOUT SCIATICA: ICD-10-CM

## 2022-04-11 DIAGNOSIS — N18.32 STAGE 3B CHRONIC KIDNEY DISEASE (HCC): ICD-10-CM

## 2022-04-11 DIAGNOSIS — M54.50 CHRONIC BILATERAL LOW BACK PAIN WITHOUT SCIATICA: ICD-10-CM

## 2022-04-11 DIAGNOSIS — Z00.00 MEDICARE ANNUAL WELLNESS VISIT, SUBSEQUENT: Primary | ICD-10-CM

## 2022-04-11 DIAGNOSIS — R79.83 HOMOCYSTEINEMIA: ICD-10-CM

## 2022-04-11 DIAGNOSIS — Z79.899 CONTROLLED SUBSTANCE AGREEMENT SIGNED: ICD-10-CM

## 2022-04-11 PROCEDURE — G8417 CALC BMI ABV UP PARAM F/U: HCPCS | Performed by: FAMILY MEDICINE

## 2022-04-11 PROCEDURE — G8399 PT W/DXA RESULTS DOCUMENT: HCPCS | Performed by: FAMILY MEDICINE

## 2022-04-11 PROCEDURE — 1123F ACP DISCUSS/DSCN MKR DOCD: CPT | Performed by: FAMILY MEDICINE

## 2022-04-11 PROCEDURE — 1090F PRES/ABSN URINE INCON ASSESS: CPT | Performed by: FAMILY MEDICINE

## 2022-04-11 PROCEDURE — 4040F PNEUMOC VAC/ADMIN/RCVD: CPT | Performed by: FAMILY MEDICINE

## 2022-04-11 PROCEDURE — 1036F TOBACCO NON-USER: CPT | Performed by: FAMILY MEDICINE

## 2022-04-11 PROCEDURE — 99213 OFFICE O/P EST LOW 20 MIN: CPT | Performed by: FAMILY MEDICINE

## 2022-04-11 PROCEDURE — G0439 PPPS, SUBSEQ VISIT: HCPCS | Performed by: FAMILY MEDICINE

## 2022-04-11 PROCEDURE — G8427 DOCREV CUR MEDS BY ELIG CLIN: HCPCS | Performed by: FAMILY MEDICINE

## 2022-04-11 ASSESSMENT — PATIENT HEALTH QUESTIONNAIRE - PHQ9
SUM OF ALL RESPONSES TO PHQ QUESTIONS 1-9: 0
1. LITTLE INTEREST OR PLEASURE IN DOING THINGS: 0
2. FEELING DOWN, DEPRESSED OR HOPELESS: 0
SUM OF ALL RESPONSES TO PHQ9 QUESTIONS 1 & 2: 0

## 2022-04-11 ASSESSMENT — LIFESTYLE VARIABLES: HOW OFTEN DO YOU HAVE A DRINK CONTAINING ALCOHOL: NEVER

## 2022-04-11 NOTE — PATIENT INSTRUCTIONS
LAB INSTRUCTIONS:    Please complete labs in 4 week(s). Non-fasting is fine    The clinic will call you within 1 week of collection. If you have not heard from us within that amount of time, please call us at [phone]. Personalized Preventive Plan for Regis Thompson - 4/11/2022  Medicare offers a range of preventive health benefits. Some of the tests and screenings are paid in full while other may be subject to a deductible, co-insurance, and/or copay. Some of these benefits include a comprehensive review of your medical history including lifestyle, illnesses that may run in your family, and various assessments and screenings as appropriate. After reviewing your medical record and screening and assessments performed today your provider may have ordered immunizations, labs, imaging, and/or referrals for you. A list of these orders (if applicable) as well as your Preventive Care list are included within your After Visit Summary for your review. Other Preventive Recommendations:    · A preventive eye exam performed by an eye specialist is recommended every 1-2 years to screen for glaucoma; cataracts, macular degeneration, and other eye disorders. · A preventive dental visit is recommended every 6 months. · Try to get at least 150 minutes of exercise per week or 10,000 steps per day on a pedometer . · Order or download the FREE \"Exercise & Physical Activity: Your Everyday Guide\" from [employer]. Call 8-423.901.3853 or search [employer] online. · You need 5954-6677 mg of calcium and 8862-5374 IU of vitamin D per day. It is possible to meet your calcium requirement with diet alone, but a vitamin D supplement is usually necessary to meet this goal.  · When exposed to the sun, use a sunscreen that protects against both UVA and UVB radiation with an SPF of 30 or greater.  Reapply every 2 to 3 hours or after sweating, drying off with a towel, or swimming. · Always wear a seat belt when traveling in a car. Always wear a helmet when riding a bicycle or motorcycle.

## 2022-04-30 DIAGNOSIS — F41.1 GAD (GENERALIZED ANXIETY DISORDER): Chronic | ICD-10-CM

## 2022-05-06 ENCOUNTER — HOSPITAL ENCOUNTER (OUTPATIENT)
Dept: WOMENS IMAGING | Age: 77
Discharge: HOME | End: 2022-05-06
Payer: MEDICARE

## 2022-05-06 ENCOUNTER — NURSE ONLY (OUTPATIENT)
Dept: LAB | Age: 77
End: 2022-05-06

## 2022-05-06 DIAGNOSIS — D64.9 NORMOCYTIC ANEMIA: ICD-10-CM

## 2022-05-06 DIAGNOSIS — M85.89 OSTEOPENIA OF MULTIPLE SITES: ICD-10-CM

## 2022-05-06 DIAGNOSIS — Z78.0 POST-MENOPAUSE: ICD-10-CM

## 2022-05-06 DIAGNOSIS — Z12.31 ENCOUNTER FOR SCREENING MAMMOGRAM FOR MALIGNANT NEOPLASM OF BREAST: ICD-10-CM

## 2022-05-06 LAB
BASOPHILS # BLD: 1 %
BASOPHILS ABSOLUTE: 0.1 THOU/MM3 (ref 0–0.1)
EOSINOPHIL # BLD: 4.2 %
EOSINOPHILS ABSOLUTE: 0.3 THOU/MM3 (ref 0–0.4)
ERYTHROCYTE [DISTWIDTH] IN BLOOD BY AUTOMATED COUNT: 12.9 % (ref 11.5–14.5)
ERYTHROCYTE [DISTWIDTH] IN BLOOD BY AUTOMATED COUNT: 44.6 FL (ref 35–45)
HCT VFR BLD CALC: 37.4 % (ref 37–47)
HEMOGLOBIN: 12 GM/DL (ref 12–16)
IMMATURE GRANS (ABS): 0.03 THOU/MM3 (ref 0–0.07)
IMMATURE GRANULOCYTES: 0.5 %
LYMPHOCYTES # BLD: 24 %
LYMPHOCYTES ABSOLUTE: 1.5 THOU/MM3 (ref 1–4.8)
MCH RBC QN AUTO: 30.5 PG (ref 26–33)
MCHC RBC AUTO-ENTMCNC: 32.1 GM/DL (ref 32.2–35.5)
MCV RBC AUTO: 95.2 FL (ref 81–99)
MONOCYTES # BLD: 5.7 %
MONOCYTES ABSOLUTE: 0.4 THOU/MM3 (ref 0.4–1.3)
NUCLEATED RED BLOOD CELLS: 0 /100 WBC
PLATELET # BLD: 214 THOU/MM3 (ref 130–400)
PMV BLD AUTO: 9.5 FL (ref 9.4–12.4)
RBC # BLD: 3.93 MILL/MM3 (ref 4.2–5.4)
SEG NEUTROPHILS: 64.6 %
SEGMENTED NEUTROPHILS ABSOLUTE COUNT: 4 THOU/MM3 (ref 1.8–7.7)
WBC # BLD: 6.2 THOU/MM3 (ref 4.8–10.8)

## 2022-05-06 PROCEDURE — 77063 BREAST TOMOSYNTHESIS BI: CPT

## 2022-05-06 PROCEDURE — 77080 DXA BONE DENSITY AXIAL: CPT

## 2022-05-09 ENCOUNTER — TELEPHONE (OUTPATIENT)
Dept: FAMILY MEDICINE CLINIC | Age: 77
End: 2022-05-09

## 2022-05-09 ENCOUNTER — PATIENT MESSAGE (OUTPATIENT)
Dept: FAMILY MEDICINE CLINIC | Age: 77
End: 2022-05-09

## 2022-05-09 NOTE — TELEPHONE ENCOUNTER
----- Message from James Hernandez DO sent at 5/8/2022  8:36 AM EDT -----  Please let pt know that f/u CBC is WNL  Mild anemia resolved. Let me know if questions, thanks!

## 2022-05-09 NOTE — TELEPHONE ENCOUNTER
----- Message from Parker Olivares, DO sent at 5/9/2022  6:13 AM EDT -----  Please let pt know that mammogram is normal. Let me know if questions, thanks!

## 2022-05-10 ENCOUNTER — TELEPHONE (OUTPATIENT)
Dept: FAMILY MEDICINE CLINIC | Age: 77
End: 2022-05-10

## 2022-05-10 DIAGNOSIS — M81.0 OSTEOPOROSIS, POST-MENOPAUSAL: Primary | ICD-10-CM

## 2022-05-10 RX ORDER — ZOLEDRONIC ACID 5 MG/100ML
5 INJECTION, SOLUTION INTRAVENOUS ONCE
Qty: 100 ML | Refills: 0 | Status: SHIPPED | OUTPATIENT
Start: 2022-05-10 | End: 2022-10-11 | Stop reason: SDUPTHER

## 2022-05-10 NOTE — TELEPHONE ENCOUNTER
----- Message from Linda Maradiaga DO sent at 5/10/2022  6:37 AM EDT -----  Please let pt know that DEXA scan shows worsening bone density. Pt has osteoporosis. Would recommend we add a medication called reclast that we do once a year via an IV to help strengthen her bones. Con't calcium supplement and Vit D    If ok with this, will need 1 piece of lab work prior to setting up. Let me know, thanks!

## 2022-05-10 NOTE — TELEPHONE ENCOUNTER
Pt informed and verbalized understanding.   Pt is ok with the reclast/she will go to [place] to get the labs done

## 2022-05-10 NOTE — TELEPHONE ENCOUNTER
Once labs are done, we can start PA etc     Diagnosis Orders   1.  Osteoporosis, post-menopausal  Basic Metabolic Panel    zoledronic acid (RECLAST) 5 MG/100ML SOLN     Future Appointments   Date Time Provider Harmony Dos Santos   5/25/2022  1:45 PM [place] Ana M Gomez MD SR[facility]X NW CARD [facility]   10/4/2022 11:30 AM Ingrid Tolentino MD SRPX FM RES [facility]   10/11/2022  1:20 PM Sravan Colby, [address] [place] [address]

## 2022-05-19 ENCOUNTER — NURSE ONLY (OUTPATIENT)
Dept: LAB | Age: 77
End: 2022-05-19

## 2022-05-19 LAB
ANION GAP SERPL CALCULATED.3IONS-SCNC: 14 MEQ/L (ref 8–16)
BUN BLDV-MCNC: 28 MG/DL (ref 7–22)
CALCIUM SERPL-MCNC: 8.6 MG/DL (ref 8.5–10.5)
CHLORIDE BLD-SCNC: 105 MEQ/L (ref 98–111)
CO2: 23 MEQ/L (ref 23–33)
CREAT SERPL-MCNC: 1.3 MG/DL (ref 0.4–1.2)
ERYTHROCYTE [DISTWIDTH] IN BLOOD BY AUTOMATED COUNT: 13.3 % (ref 11.5–14.5)
ERYTHROCYTE [DISTWIDTH] IN BLOOD BY AUTOMATED COUNT: 47.8 FL (ref 35–45)
GFR SERPL CREATININE-BSD FRML MDRD: 40 ML/MIN/1.73M2
GLUCOSE BLD-MCNC: 78 MG/DL (ref 70–108)
HCT VFR BLD CALC: 36.5 % (ref 37–47)
HEMOGLOBIN: 11.5 GM/DL (ref 12–16)
MCH RBC QN AUTO: 30.4 PG (ref 26–33)
MCHC RBC AUTO-ENTMCNC: 31.5 GM/DL (ref 32.2–35.5)
MCV RBC AUTO: 96.6 FL (ref 81–99)
PLATELET # BLD: 195 THOU/MM3 (ref 130–400)
PMV BLD AUTO: 9.7 FL (ref 9.4–12.4)
POTASSIUM SERPL-SCNC: 4.4 MEQ/L (ref 3.5–5.2)
RBC # BLD: 3.78 MILL/MM3 (ref 4.2–5.4)
SODIUM BLD-SCNC: 142 MEQ/L (ref 135–145)
WBC # BLD: 5 THOU/MM3 (ref 4.8–10.8)

## 2022-05-23 DIAGNOSIS — M15.9 PRIMARY OSTEOARTHRITIS INVOLVING MULTIPLE JOINTS: Primary | ICD-10-CM

## 2022-05-23 DIAGNOSIS — Z79.899 CONTROLLED SUBSTANCE AGREEMENT SIGNED: ICD-10-CM

## 2022-05-23 RX ORDER — TRAMADOL HYDROCHLORIDE 50 MG/1
50 TABLET ORAL 2 TIMES DAILY PRN
Qty: 60 TABLET | Refills: 2 | Status: SHIPPED | OUTPATIENT
Start: 2022-05-23 | End: 2022-08-22

## 2022-05-23 NOTE — TELEPHONE ENCOUNTER
ASSESSMENT & PLAN   Diagnosis Orders   1. Primary osteoarthritis involving multiple joints  traMADol (ULTRAM) 50 MG tablet   2. Controlled substance agreement signed; tramadol  traMADol (ULTRAM) 50 MG tablet       OAARS reviewed and appropriate. Controlled Substances Monitoring: Periodic Controlled Substance Monitoring: Possible medication side effects, risk of tolerance/dependence & alternative treatments discussed. ,No signs of potential drug abuse or diversion identified. ,Assessed functional status.  Haider Harry, DO)      Future Appointments   Date Time Provider Harmony Delgadilloi   5/25/2022  1:45 PM MD JACQUI Barroso NW CARD [facility] Juan Casey   10/4/2022 11:30 AM MD JACQUI Francisco FM RES JOAQUINA Casey   10/11/2022  1:20 PM Haider Harry, [address] [place] [address]

## 2022-05-25 ENCOUNTER — OFFICE VISIT (OUTPATIENT)
Dept: CARDIOLOGY CLINIC | Age: 77
End: 2022-05-25
Payer: MEDICARE

## 2022-05-25 VITALS
HEART RATE: 78 BPM | RESPIRATION RATE: 16 BRPM | WEIGHT: 208.6 LBS | DIASTOLIC BLOOD PRESSURE: 74 MMHG | OXYGEN SATURATION: 98 % | HEIGHT: 63 IN | SYSTOLIC BLOOD PRESSURE: 126 MMHG | BODY MASS INDEX: 36.96 KG/M2

## 2022-05-25 DIAGNOSIS — I25.10 CORONARY ARTERY DISEASE INVOLVING NATIVE CORONARY ARTERY OF NATIVE HEART WITHOUT ANGINA PECTORIS: Primary | ICD-10-CM

## 2022-05-25 DIAGNOSIS — I50.22 CHRONIC SYSTOLIC (CONGESTIVE) HEART FAILURE (HCC): ICD-10-CM

## 2022-05-25 DIAGNOSIS — E78.5 DYSLIPIDEMIA (HIGH LDL; LOW HDL): ICD-10-CM

## 2022-05-25 PROBLEM — N18.30 CHRONIC RENAL DISEASE, STAGE III (HCC): Status: ACTIVE | Noted: 2022-05-25

## 2022-05-25 PROCEDURE — G8417 CALC BMI ABV UP PARAM F/U: HCPCS | Performed by: INTERNAL MEDICINE

## 2022-05-25 PROCEDURE — 99213 OFFICE O/P EST LOW 20 MIN: CPT | Performed by: INTERNAL MEDICINE

## 2022-05-25 PROCEDURE — G8399 PT W/DXA RESULTS DOCUMENT: HCPCS | Performed by: INTERNAL MEDICINE

## 2022-05-25 PROCEDURE — G8427 DOCREV CUR MEDS BY ELIG CLIN: HCPCS | Performed by: INTERNAL MEDICINE

## 2022-05-25 PROCEDURE — 1123F ACP DISCUSS/DSCN MKR DOCD: CPT | Performed by: INTERNAL MEDICINE

## 2022-05-25 PROCEDURE — 1090F PRES/ABSN URINE INCON ASSESS: CPT | Performed by: INTERNAL MEDICINE

## 2022-05-25 PROCEDURE — 1036F TOBACCO NON-USER: CPT | Performed by: INTERNAL MEDICINE

## 2022-05-25 PROCEDURE — 93000 ELECTROCARDIOGRAM COMPLETE: CPT | Performed by: INTERNAL MEDICINE

## 2022-05-25 ASSESSMENT — ENCOUNTER SYMPTOMS
SHORTNESS OF BREATH: 0
NAUSEA: 0
COLOR CHANGE: 0
STRIDOR: 0
ANAL BLEEDING: 0
VOICE CHANGE: 0
TROUBLE SWALLOWING: 0
COUGH: 0
APNEA: 0
ABDOMINAL DISTENTION: 0
BLOOD IN STOOL: 0
ABDOMINAL PAIN: 0
VOMITING: 0
WHEEZING: 0
CHEST TIGHTNESS: 0
CHOKING: 0

## 2022-05-25 NOTE — PROGRESS NOTES
[address] [facility]  [place] [address]  Dept: [phone]  Loc: [phone]     5/25/2022       Tor Hassan is here today for   Chief Complaint   Patient presents with    Other     patient is here for 6 month check up           Referring Physician:  No ref. provider found     Patient Active Problem List   Diagnosis    Chronic low back pain    LYLA (generalized anxiety disorder)    Homocysteinemia    Vitamin D deficiency    CAD (coronary artery disease)    Left bundle branch block    Hypertension    Dyslipidemia    Obesity (BMI 30-39. 9)    Asthma, mild intermittent    History of gastroesophageal reflux (GERD)    SUNI on CPAP    Osteoporosis, post-menopausal    Seasonal allergies    Controlled substance agreement signed; tramadol    Osteoarthritis    Hypertensive heart disease with congestive heart failure (HCC)    Heart failure with preserved ejection fraction (HCC)    CKD (chronic kidney disease) stage 3, GFR 30-59 ml/min (HCC)    Tonsillar mass    Homocystinuria (HCC)    Chronic renal disease, stage III ([facility]) [836354]    Chronic systolic (congestive) heart failure       Review of Systems   Constitutional: Negative for activity change, appetite change, fatigue, fever and unexpected weight change. HENT: Negative for congestion, trouble swallowing and voice change. Eyes: Negative for visual disturbance. Respiratory: Negative for apnea, cough, choking, chest tightness, shortness of breath, wheezing and stridor. Cardiovascular: Negative for chest pain, palpitations and leg swelling. Gastrointestinal: Negative for abdominal distention, abdominal pain, anal bleeding, blood in stool, nausea and vomiting. Endocrine: Negative for cold intolerance and heat intolerance. Genitourinary: Negative for hematuria.    Musculoskeletal: Negative for arthralgias, gait problem, joint swelling and myalgias. Skin: Negative for color change and rash. Allergic/Immunologic: Negative for environmental allergies and food allergies. Neurological: Negative for dizziness, tremors, syncope, facial asymmetry, weakness, light-headedness, numbness and headaches. Hematological: Does not bruise/bleed easily. Psychiatric/Behavioral: Negative for agitation, behavioral problems and sleep disturbance. Past Medical History:   Diagnosis Date    Asthma, mild intermittent     CAD (coronary artery disease)     mild disease in small vessels per cath 2014. Follows with Radha Ross    Chronic low back pain     CKD (chronic kidney disease) stage 3, GFR 30-59 ml/min ([facility])     Controlled substance agreement signed; tramadol 03/27/2019    Dyslipidemia     LYLA (generalized anxiety disorder)     Heart failure with preserved ejection fraction ([facility] [address] 05/20/2019    Previously <30%, recovered.  History of gastroesophageal reflux (GERD)     no longer on medication    Homocysteinemia 04/18/2016    Hypertension     Hypertensive heart disease with congestive heart failure ([facility] [address] 05/17/2019    Left bundle branch block     Obesity (BMI 30-39. 9)     SUNI on CPAP     wears CPAP nightly    Osteoarthritis     Osteoporosis, post-menopausal     Seasonal allergies     Vitamin D deficiency 05/24/2016       Allergies   Allergen Reactions    Duricef [Cefadroxil]        Current Outpatient Medications   Medication Sig Dispense Refill    traMADol (ULTRAM) 50 MG tablet Take 1 tablet by mouth 2 times daily as needed for Pain for up to 90 days.  60 tablet 2    sertraline (ZOLOFT) 50 MG tablet Take 1 tablet by mouth once daily 90 tablet 3    Ascorbic Acid (VITAMIN C WITH VICTOR M HIPS) 500 MG tablet TAKE 2 TABLETS BY MOUTH ONCE DAILY      B Complex-Biotin-FA (B-COMPLEX PO)       albuterol-ipratropium (COMBIVENT RESPIMAT)  MCG/ACT AERS inhaler 1 puff      sacubitril-valsartan (ENTRESTO) 24-26 MG per tablet 1 Tablet      Omega-3 Fatty Acids (FISH OIL) 1200 MG CAPS 1 capsule      folic acid (FOLVITE) 587 MCG tablet 1 tablet      LACTOBACILLUS PO as directed      nitroGLYCERIN (NITROSTAT) 0.4 MG SL tablet       Cyanocobalamin (VITAMIN B12) 3000 MCG/ML LIQD as directed      Methylcobalamin 1000 MCG SUBL Place 1 tablet under the tongue daily      Levomefolate Glucosamine (METHYLFOLATE PO) Take 5 mg by mouth daily      potassium chloride (KLOR-CON M) 10 MEQ extended release tablet Take 1 tablet by mouth twice daily 180 tablet 3    bumetanide (BUMEX) 2 MG tablet Take 1 tablet by mouth once daily 90 tablet 3    DHEA 10 MG TABS Take 2 tablets by mouth every morning (before breakfast) MCKENNA 5 mg tab at Tier 3       Vitamins-Lipotropics (BALANCE B-100) TABS Take 1 tablet by mouth 2 times daily (before meals)       Omega 3 1000 MG CAPS Take 1 capsule by mouth 4 times daily (before meals and nightly) (Patient taking differently: Take 1 capsule by mouth 4 times daily (before meals and nightly) [id number] [facility]) 100 capsule 5    Cinnamon 500 MG CAPS Take 2 capsules by mouth 3 times daily       carvedilol (COREG) 6.25 MG tablet Take 1 tablet by mouth 2 times daily (with meals) 60 tablet 3    Cholecalciferol (VITAMIN D3) 2000 units CAPS Take 1 capsule by mouth daily Double Wednesday and Sunday(2 days will work)     [place] cetirizine (ZYRTEC) 10 MG tablet Take 10 mg by mouth daily as needed for Allergies      Lactobacillus-Inulin ([facility]) CAPS Take 1 capsule by mouth daily       nitroGLYCERIN (NITROSTAT) 0.4 MG SL tablet Place 0.4 mg under the tongue every 5 minutes as needed for Chest pain.  aspirin 81 MG tablet Take 81 mg by mouth daily.  pravastatin (PRAVACHOL) 40 MG tablet Take 40 mg by mouth daily.       zoledronic acid (RECLAST) 5 MG/100ML SOLN Infuse 100 mLs intravenously once for 1 dose 100 mL 0    ENTRESTO 49-51 MG per tablet TAKE 1 TABLET BY MOUTH TWICE DAILY       No current facility-administered medications for this visit. Social History     Socioeconomic History    Marital status:      Spouse name: None    Number of children: None    Years of education: None    Highest education level: None   Occupational History    None   Tobacco Use    Smoking status: Passive Smoke Exposure - Never Smoker    Smokeless tobacco: Never Used   Vaping Use    Vaping Use: Never used   Substance and Sexual Activity    Alcohol use: No    Drug use: No    Sexual activity: None   Other Topics Concern    None   Social History Narrative    None     Social Determinants of Health     Financial Resource Strain: Low Risk     Difficulty of Paying Living Expenses: Not hard at all   Food Insecurity: No Food Insecurity    Worried About Running Out of Food in the Last Year: Never true    Mio of Food in the Last Year: Never true   Transportation Needs:     Lack of Transportation (Medical): Not on file    Lack of Transportation (Non-Medical):  Not on file   Physical Activity: Insufficiently Active    Days of Exercise per Week: 3 days    Minutes of Exercise per Session: 40 min   Stress:     Feeling of Stress : Not on file   Social Connections:     Frequency of Communication with Friends and Family: Not on file    Frequency of Social Gatherings with Friends and Family: Not on file    Attends Buddhism Services: Not on file    Active Member of Clubs or Organizations: Not on file    Attends Club or Organization Meetings: Not on file    Marital Status: Not on file   Intimate Partner Violence:     Fear of Current or Ex-Partner: Not on file    Emotionally Abused: Not on file    Physically Abused: Not on file    Sexually Abused: Not on file   Housing Stability:     Unable to Pay for Housing in the Last Year: Not on file    Number of [place] in the Last Year: Not on file    Unstable Housing in the Last Year: Not on file       Family History   Problem Relation Age of Onset    Heart Disease Mother  Cancer Father     Cancer Brother     Dementia Brother     Diabetes Maternal Grandmother     Heart Disease Maternal Grandmother     Glaucoma Brother        Blood pressure 126/74, pulse 78, resp. rate 16, height 5' 3\" (1.6 m), weight 208 lb 9.6 oz (94.6 kg), SpO2 98 %. Physical Exam:    General Appearance: alert and oriented to person, place and time, in no acute distress  Cardiovascular: normal rate, regular rhythm, normal S1 and S2, no murmurs, rubs, clicks, or gallops, distal pulses intact, no carotid bruits, no JVD  Pulmonary/Chest: clear to auscultation bilaterally- no wheezes, rales or rhonchi, normal air movement, no respiratory distress  Abdomen: soft, non-tender, non-distended, normal bowel sounds, no masses   Extremities: no cyanosis, clubbing or edema, pulse   Skin: warm and dry, no rash or erythema  Head: normocephalic and atraumatic  Eyes: pupils equal, round, and reactive to light  Neck: supple and non-tender without mass, no thyromegaly   Musculoskeletal: normal range of motion, no joint swelling, deformity or tenderness  Neurological: alert, oriented, normal speech, no focal findings or movement disorder noted    Lab Data:    Cardiac Enzymes:  No results for input(s): CKTOTAL, CKMB, CKMBINDEX, TROPONINI in the last 72 hours.     CBC:   Lab Results   Component Value Date    WBC 5.0 05/19/2022    RBC 3.78 05/19/2022    RBC 0-2 07/13/2011    HGB 11.5 05/19/2022    HCT 36.5 05/19/2022     05/19/2022       CMP:    Lab Results   Component Value Date     05/19/2022    K 4.4 05/19/2022    K 3.8 05/20/2019     05/19/2022    CO2 23 05/19/2022    BUN 28 05/19/2022    CREATININE 1.3 05/19/2022    LABGLOM 40 05/19/2022    GLUCOSE 78 05/19/2022    GLUCOSE 95 10/08/2019    CALCIUM 8.6 05/19/2022       Hepatic Function Panel:    Lab Results   Component Value Date    ALKPHOS 80 10/27/2021    ALT 13 10/27/2021    AST 23 10/27/2021    PROT 7.2 10/27/2021    BILITOT 1.0 10/27/2021    BILIDIR <0.2 10/27/2021    LABALBU 4.6 10/27/2021       Magnesium:    Lab Results   Component Value Date    MG 2.3 03/04/2022       PT/INR:  No results found for: PROTIME, INR    HgBA1c:    Lab Results   Component Value Date    LABA1C 6.0 10/27/2021       FLP:    Lab Results   Component Value Date    TRIG 103 10/27/2021    HDL 77 10/27/2021    LDLCALC 83 10/27/2021    LDLDIRECT 134.00 11/27/2013       TSH:    Lab Results   Component Value Date    TSH 3.520 10/27/2021        Diagnosis Orders   1. Coronary artery disease involving native coronary artery of native heart without angina pectoris  [address]449 - [place] ELECTROCARDIOGRAM, COMPLETE    CBC    Basic Metabolic Panel    Lipid Panel    Hepatic Function Panel   2. Dyslipidemia (high LDL; low HDL)  CBC    Basic Metabolic Panel    Lipid Panel    Hepatic Function Panel   3. Chronic systolic (congestive) heart failure          Assessment/Plan    Sol Dutta is a 68years old lady with a history of cardiomyopathy left bundle branch block her cardiomyopathy has improved she is feeling okay occasionally has some palpitation she denied dizziness or syncopal episodes. She does have a history of vitamin D deficiency and she has been on vitamin she has history of depression she has been on Zoloft the patient somewhat overweight she is taking care of her ailing . The patient has a history of hypercholesterolemia she has been on the statinAnd she has a history of hypercholesterolemia been on statin patient has chronic left bundle branch block.   Overall cardiac wise she is stable she will continue current medication the lab work finding the EKG findings medications were all discussed with the patient and her questions were answered to her satisfaction she will be seen periodically and seek medical attention for any change clinical condition end of dictation thank    Orders Placed This Encounter   Procedures    CBC     Standing Status:   Future     Standing Expiration Date:   5/25/2023   Zee Byrd Basic Metabolic Panel     Standing Status:   Future     Standing Expiration Date:   5/25/2023    Lipid Panel     Standing Status:   Future     Standing Expiration Date:   5/25/2023     Order Specific Question:   Is Patient Fasting?/# of Hours     Answer:   12 hours    Hepatic Function Panel     Standing Status:   Future     Standing Expiration Date:   5/25/2023    [address]60 - [place] ELECTROCARDIOGRAM, COMPLETE       Return in about 1 year (around 5/25/2023).      Adriana Dutta MD

## 2022-06-29 DIAGNOSIS — I50.22 HYPERTENSIVE HEART DISEASE WITH CHRONIC SYSTOLIC CONGESTIVE HEART FAILURE (HCC): ICD-10-CM

## 2022-06-29 DIAGNOSIS — I50.1 HEART FAILURE, LEFT, WITH LVEF <=30% (HCC): ICD-10-CM

## 2022-06-29 DIAGNOSIS — I11.0 HYPERTENSIVE HEART DISEASE WITH CHRONIC SYSTOLIC CONGESTIVE HEART FAILURE (HCC): ICD-10-CM

## 2022-06-29 RX ORDER — BUMETANIDE 2 MG/1
TABLET ORAL
Qty: 90 TABLET | Refills: 3 | Status: SHIPPED | OUTPATIENT
Start: 2022-06-29

## 2022-08-22 DIAGNOSIS — M15.9 PRIMARY OSTEOARTHRITIS INVOLVING MULTIPLE JOINTS: Primary | ICD-10-CM

## 2022-08-22 DIAGNOSIS — Z79.899 CONTROLLED SUBSTANCE AGREEMENT SIGNED: ICD-10-CM

## 2022-08-22 RX ORDER — TRAMADOL HYDROCHLORIDE 50 MG/1
50 TABLET ORAL 2 TIMES DAILY PRN
Qty: 60 TABLET | Refills: 2 | Status: SHIPPED | OUTPATIENT
Start: 2022-08-22 | End: 2022-11-20

## 2022-08-22 NOTE — TELEPHONE ENCOUNTER
ASSESSMENT & PLAN   Diagnosis Orders   1. Primary osteoarthritis involving multiple joints  traMADol (ULTRAM) 50 MG tablet      2. Controlled substance agreement signed; tramadol  traMADol (ULTRAM) 50 MG tablet          OAARS reviewed and appropriate. Controlled Substances Monitoring: Periodic Controlled Substance Monitoring: Possible medication side effects, risk of tolerance/dependence & alternative treatments discussed., No signs of potential drug abuse or diversion identified., Obtaining appropriate analgesic effect of treatment.  Latonia Alvarez DO)      Future Appointments   Date Time Provider [facility]   10/4/2022 11:30 AM Madelin Her MD [facility] [place]   10/11/2022  1:20 PM Latonia Alvarez DO [facility] [address] [facility] [place]   5/24/2023  1:30 PM Vesna Clinton MD [address] [place]

## 2022-08-22 NOTE — TELEPHONE ENCOUNTER
Recent Visits  Date Type Provider Dept   04/11/22 Office Visit Ena Iqbal DO [facility]   04/04/22 Office Visit Lalit Bueno MD [facility]   11/11/21 Office Visit Lalit Bueno MD [facility]   [facility] [place]   08/11/21 Office Visit Lalit Bueno MD [facility]   06[facility]28/[facility] [place] [place]   04/13/21 Office Visit Lalit Bueno MD [facility]   04/08/21 Office Visit Ena Iqbal DO [facility]   Showing recent visits within past 540 days with a meds authorizing provider and meeting all other requirements  Future Appointments  Date Type Provider Dept   10/04/22 Appointment Lalit Bueno MD [facility]   10/11/22 Appointment Ena Iqbal DO [facility]   Showing future appointments within next 150 days with a meds authorizing provider and meeting all other requirements     Future Appointments   Date Time Provider [place] [place]   10/4/2022 11:30 AM Lalit Bueno MD [facility] [place]   10/11/2022  1:20 PM Ena Iqbal DO [address] [place], [place] [address] [facility] [place]   5/24/2023  1:30 PM Emmie Meneses MD [address] CARD [facility] - [place]

## 2022-09-07 DIAGNOSIS — I11.0 HYPERTENSIVE HEART DISEASE WITH CHRONIC SYSTOLIC CONGESTIVE HEART FAILURE (HCC): ICD-10-CM

## 2022-09-07 DIAGNOSIS — I50.22 HYPERTENSIVE HEART DISEASE WITH CHRONIC SYSTOLIC CONGESTIVE HEART FAILURE (HCC): ICD-10-CM

## 2022-09-07 DIAGNOSIS — I50.1 HEART FAILURE, LEFT, WITH LVEF <=30% (HCC): ICD-10-CM

## 2022-09-07 RX ORDER — POTASSIUM CHLORIDE 750 MG/1
TABLET, EXTENDED RELEASE ORAL
Qty: 180 TABLET | Refills: 3 | Status: SHIPPED | OUTPATIENT
Start: 2022-09-07

## 2022-10-01 ENCOUNTER — NURSE ONLY (OUTPATIENT)
Dept: LAB | Age: 77
End: 2022-10-01

## 2022-10-01 DIAGNOSIS — I10 ESSENTIAL HYPERTENSION: ICD-10-CM

## 2022-10-01 DIAGNOSIS — E66.01 SEVERE OBESITY (BMI 35.0-39.9) WITH COMORBIDITY (HCC): ICD-10-CM

## 2022-10-01 DIAGNOSIS — E72.11 HOMOCYSTINURIA (HCC): ICD-10-CM

## 2022-10-01 DIAGNOSIS — R73.09 LOW GLUCOSE LEVEL: ICD-10-CM

## 2022-10-01 DIAGNOSIS — E78.5 DYSLIPIDEMIA: ICD-10-CM

## 2022-10-01 DIAGNOSIS — R53.83 OTHER FATIGUE: ICD-10-CM

## 2022-10-01 DIAGNOSIS — N18.32 STAGE 3B CHRONIC KIDNEY DISEASE (HCC): ICD-10-CM

## 2022-10-01 DIAGNOSIS — I50.32 CHRONIC HEART FAILURE WITH PRESERVED EJECTION FRACTION (HCC): ICD-10-CM

## 2022-10-01 DIAGNOSIS — R79.83 HOMOCYSTEINEMIA: ICD-10-CM

## 2022-10-01 DIAGNOSIS — U07.1 COVID-19: ICD-10-CM

## 2022-10-01 DIAGNOSIS — K90.89 OTHER INTESTINAL MALABSORPTION: ICD-10-CM

## 2022-10-01 DIAGNOSIS — R10.30 LOWER ABDOMINAL PAIN: ICD-10-CM

## 2022-10-01 DIAGNOSIS — I50.22 HYPERTENSIVE HEART DISEASE WITH CHRONIC SYSTOLIC CONGESTIVE HEART FAILURE (HCC): ICD-10-CM

## 2022-10-01 DIAGNOSIS — M15.9 PRIMARY OSTEOARTHRITIS INVOLVING MULTIPLE JOINTS: ICD-10-CM

## 2022-10-01 DIAGNOSIS — I11.0 HYPERTENSIVE HEART DISEASE WITH CHRONIC SYSTOLIC CONGESTIVE HEART FAILURE (HCC): ICD-10-CM

## 2022-10-01 DIAGNOSIS — F41.1 GAD (GENERALIZED ANXIETY DISORDER): ICD-10-CM

## 2022-10-01 DIAGNOSIS — I50.1 HEART FAILURE, LEFT, WITH LVEF <=30% (HCC): ICD-10-CM

## 2022-10-01 LAB
AVERAGE GLUCOSE: 96 MG/DL (ref 70–126)
BASOPHILS # BLD: 1 %
BASOPHILS ABSOLUTE: 0.1 THOU/MM3 (ref 0–0.1)
C-REACTIVE PROTEIN, HIGH SENSITIVITY: 1.5 MG/L
CALCIUM SERPL-MCNC: 9.5 MG/DL (ref 8.5–10.5)
EOSINOPHIL # BLD: 5.3 %
EOSINOPHILS ABSOLUTE: 0.3 THOU/MM3 (ref 0–0.4)
ERYTHROCYTE [DISTWIDTH] IN BLOOD BY AUTOMATED COUNT: 12.5 % (ref 11.5–14.5)
ERYTHROCYTE [DISTWIDTH] IN BLOOD BY AUTOMATED COUNT: 43.1 FL (ref 35–45)
ESTRADIOL LEVEL: 25.5 PG/ML (ref 5–50)
HBA1C MFR BLD: 5.2 % (ref 4.4–6.4)
HCT VFR BLD CALC: 36.9 % (ref 37–47)
HEMOGLOBIN: 12.1 GM/DL (ref 12–16)
HOMOCYSTEINE: 23.6 UMOL/L
IMMATURE GRANS (ABS): 0.01 THOU/MM3 (ref 0–0.07)
IMMATURE GRANULOCYTES: 0.2 %
LYMPHOCYTES # BLD: 36.6 %
LYMPHOCYTES ABSOLUTE: 2.2 THOU/MM3 (ref 1–4.8)
MAGNESIUM: 2.4 MG/DL (ref 1.6–2.4)
MCH RBC QN AUTO: 30.8 PG (ref 26–33)
MCHC RBC AUTO-ENTMCNC: 32.8 GM/DL (ref 32.2–35.5)
MCV RBC AUTO: 93.9 FL (ref 81–99)
MONOCYTES # BLD: 8.9 %
MONOCYTES ABSOLUTE: 0.5 THOU/MM3 (ref 0.4–1.3)
NUCLEATED RED BLOOD CELLS: 0 /100 WBC
PLATELET # BLD: 212 THOU/MM3 (ref 130–400)
PMV BLD AUTO: 9.5 FL (ref 9.4–12.4)
PTH INTACT: 72.9 PG/ML (ref 15–65)
RBC # BLD: 3.93 MILL/MM3 (ref 4.2–5.4)
SEDIMENTATION RATE, ERYTHROCYTE: 25 MM/HR (ref 0–20)
SEG NEUTROPHILS: 48 %
SEGMENTED NEUTROPHILS ABSOLUTE COUNT: 2.8 THOU/MM3 (ref 1.8–7.7)
VITAMIN D 25-HYDROXY: 54 NG/ML (ref 30–100)
WBC # BLD: 5.9 THOU/MM3 (ref 4.8–10.8)

## 2022-10-04 ENCOUNTER — OFFICE VISIT (OUTPATIENT)
Dept: FAMILY MEDICINE CLINIC | Age: 77
End: 2022-10-04
Payer: MEDICARE

## 2022-10-04 VITALS
HEIGHT: 63 IN | SYSTOLIC BLOOD PRESSURE: 106 MMHG | TEMPERATURE: 97 F | DIASTOLIC BLOOD PRESSURE: 66 MMHG | OXYGEN SATURATION: 97 % | WEIGHT: 211.6 LBS | RESPIRATION RATE: 12 BRPM | BODY MASS INDEX: 37.49 KG/M2 | HEART RATE: 73 BPM

## 2022-10-04 DIAGNOSIS — G89.29 CHRONIC BILATERAL LOW BACK PAIN WITHOUT SCIATICA: ICD-10-CM

## 2022-10-04 DIAGNOSIS — R73.09 LOW GLUCOSE LEVEL: ICD-10-CM

## 2022-10-04 DIAGNOSIS — F41.1 GAD (GENERALIZED ANXIETY DISORDER): ICD-10-CM

## 2022-10-04 DIAGNOSIS — D64.9 NORMOCYTIC ANEMIA: ICD-10-CM

## 2022-10-04 DIAGNOSIS — E72.11 HOMOCYSTINURIA (HCC): ICD-10-CM

## 2022-10-04 DIAGNOSIS — I11.0 HYPERTENSIVE HEART DISEASE WITH CHRONIC SYSTOLIC CONGESTIVE HEART FAILURE (HCC): Primary | ICD-10-CM

## 2022-10-04 DIAGNOSIS — I50.32 CHRONIC HEART FAILURE WITH PRESERVED EJECTION FRACTION (HCC): ICD-10-CM

## 2022-10-04 DIAGNOSIS — M15.9 PRIMARY OSTEOARTHRITIS INVOLVING MULTIPLE JOINTS: ICD-10-CM

## 2022-10-04 DIAGNOSIS — M81.0 OSTEOPOROSIS, POST-MENOPAUSAL: ICD-10-CM

## 2022-10-04 DIAGNOSIS — K90.89 OTHER INTESTINAL MALABSORPTION: ICD-10-CM

## 2022-10-04 DIAGNOSIS — E78.5 DYSLIPIDEMIA: ICD-10-CM

## 2022-10-04 DIAGNOSIS — M85.89 OSTEOPENIA OF MULTIPLE SITES: ICD-10-CM

## 2022-10-04 DIAGNOSIS — R79.83 HOMOCYSTEINEMIA: ICD-10-CM

## 2022-10-04 DIAGNOSIS — I50.22 HYPERTENSIVE HEART DISEASE WITH CHRONIC SYSTOLIC CONGESTIVE HEART FAILURE (HCC): Primary | ICD-10-CM

## 2022-10-04 DIAGNOSIS — E03.9 HYPOTHYROIDISM, UNSPECIFIED TYPE: ICD-10-CM

## 2022-10-04 DIAGNOSIS — I10 ESSENTIAL HYPERTENSION: ICD-10-CM

## 2022-10-04 DIAGNOSIS — M54.50 CHRONIC BILATERAL LOW BACK PAIN WITHOUT SCIATICA: ICD-10-CM

## 2022-10-04 LAB
DHEAS (DHEA SULFATE): 139 UG/DL (ref 10–90)
TISSUE TRANSGLUTAMINASE IGA: 0.3 U/ML

## 2022-10-04 PROCEDURE — 1036F TOBACCO NON-USER: CPT | Performed by: FAMILY MEDICINE

## 2022-10-04 PROCEDURE — G8484 FLU IMMUNIZE NO ADMIN: HCPCS | Performed by: FAMILY MEDICINE

## 2022-10-04 PROCEDURE — 99214 OFFICE O/P EST MOD 30 MIN: CPT | Performed by: FAMILY MEDICINE

## 2022-10-04 PROCEDURE — G8417 CALC BMI ABV UP PARAM F/U: HCPCS | Performed by: FAMILY MEDICINE

## 2022-10-04 PROCEDURE — 1090F PRES/ABSN URINE INCON ASSESS: CPT | Performed by: FAMILY MEDICINE

## 2022-10-04 PROCEDURE — G8399 PT W/DXA RESULTS DOCUMENT: HCPCS | Performed by: FAMILY MEDICINE

## 2022-10-04 PROCEDURE — 1123F ACP DISCUSS/DSCN MKR DOCD: CPT | Performed by: FAMILY MEDICINE

## 2022-10-04 PROCEDURE — G8427 DOCREV CUR MEDS BY ELIG CLIN: HCPCS | Performed by: FAMILY MEDICINE

## 2022-10-04 RX ORDER — MELATONIN 3 MG
0.5 TABLET ORAL
Qty: 30 TABLET | Refills: 5 | Status: SHIPPED | OUTPATIENT
Start: 2022-10-04

## 2022-10-04 RX ORDER — VITAMIN K2 90 MCG
800 CAPSULE ORAL 2 TIMES DAILY
Qty: 120 CAPSULE | Refills: 5 | Status: SHIPPED | OUTPATIENT
Start: 2022-10-04

## 2022-10-04 SDOH — ECONOMIC STABILITY: FOOD INSECURITY: WITHIN THE PAST 12 MONTHS, YOU WORRIED THAT YOUR FOOD WOULD RUN OUT BEFORE YOU GOT MONEY TO BUY MORE.: NEVER TRUE

## 2022-10-04 SDOH — ECONOMIC STABILITY: FOOD INSECURITY: WITHIN THE PAST 12 MONTHS, THE FOOD YOU BOUGHT JUST DIDN'T LAST AND YOU DIDN'T HAVE MONEY TO GET MORE.: NEVER TRUE

## 2022-10-04 ASSESSMENT — SOCIAL DETERMINANTS OF HEALTH (SDOH): HOW HARD IS IT FOR YOU TO PAY FOR THE VERY BASICS LIKE FOOD, HOUSING, MEDICAL CARE, AND HEATING?: NOT VERY HARD

## 2022-10-04 NOTE — PROGRESS NOTES
[address] [facility] Nuno ONEILL [address] [address]  Dept: [phone]  Dept Fax: [phone]  Loc: [phone]      Chris Palacio is a 68 y.o. White female. Mehdi Martínez  presents to the [place] [address] clinic today for   Chief Complaint   Patient presents with    [address]   , and;   1. Hypertensive heart disease with chronic systolic congestive heart failure ([address]    2. Primary osteoarthritis involving multiple joints    3. Osteoporosis, post-menopausal    4. LYLA (generalized anxiety disorder)    5. Chronic heart failure with preserved ejection fraction (HCC)    6. Essential hypertension    7. Homocysteinemia    8. Dyslipidemia    9. Chronic bilateral low back pain without sciatica    10. Osteopenia of multiple sites    11. Normocytic anemia    12. Low glucose level    13. Homocystinuria ([address]    14. Other intestinal malabsorption    15. Hypothyroidism, unspecified type          I have reviewed Chris Palacio medical, surgical and other pertinent history in detail, and have updated medication and allergy information in the computerized patient record.      Clinical Care Team:     -Referring Provider for today's consult: self  -Primary Care Provider: Negrita Lindsey DO    Medical/Surgical History:   She  has a past medical history of Asthma, mild intermittent, CAD (coronary artery disease), Chronic low back pain, CKD (chronic kidney disease) stage 3, GFR 30-59 ml/min ([facility]), Controlled substance agreement signed; tramadol, Dyslipidemia, LYLA (generalized anxiety disorder), Heart failure with preserved ejection fraction ([address] History of gastroesophageal reflux (GERD), Homocysteinemia, Hypertension, Hypertensive heart disease with congestive heart failure ([address] Left bundle branch block, Obesity (BMI 30-39.9), SUNI on CPAP, Osteoarthritis, Osteoporosis, post-menopausal, Seasonal allergies, and Vitamin (B-COMPLEX PO), , Disp: , Rfl:     albuterol-ipratropium (COMBIVENT RESPIMAT)  MCG/ACT AERS inhaler, 1 puff, Disp: , Rfl:     sacubitril-valsartan (ENTRESTO) 24-26 MG per tablet, 1 Tablet, Disp: , Rfl:     Omega-3 Fatty Acids (FISH OIL) 1200 MG CAPS, 1 capsule, Disp: , Rfl:     folic acid (FOLVITE) 904 MCG tablet, 1 tablet, Disp: , Rfl:     LACTOBACILLUS PO, as directed, Disp: , Rfl:     nitroGLYCERIN (NITROSTAT) 0.4 MG SL tablet, , Disp: , Rfl:     Cyanocobalamin (VITAMIN B12) 3000 MCG/ML LIQD, as directed, Disp: , Rfl:     ENTRESTO 49-51 MG per tablet, TAKE 1 TABLET BY MOUTH TWICE DAILY, Disp: , Rfl:     Vitamins-Lipotropics (BALANCE B-100) TABS, Take 1 tablet by mouth 2 times daily (before meals) , Disp: , Rfl:     Omega 3 1000 MG CAPS, Take 1 capsule by mouth 4 times daily (before meals and nightly) (Patient taking differently: Take 1 capsule by mouth 4 times daily (before meals and nightly) [address] [facility]), Disp: 100 capsule, Rfl: 5    Cinnamon 500 MG CAPS, Take 2 capsules by mouth 3 times daily , Disp: , Rfl:     carvedilol (COREG) 6.25 MG tablet, Take 1 tablet by mouth 2 times daily (with meals), Disp: 60 tablet, Rfl: 3    Cholecalciferol (VITAMIN D3) 2000 units CAPS, Take 1 capsule by mouth daily Double Wednesday and Sunday(2 days will work), Disp: , Rfl:     cetirizine (ZYRTEC) 10 MG tablet, Take 10 mg by mouth daily as needed for Allergies, Disp: , Rfl:     Lactobacillus-Inulin ([address]) CAPS, Take 1 capsule by mouth daily , Disp: , Rfl:     nitroGLYCERIN (NITROSTAT) 0.4 MG SL tablet, Place 0.4 mg under the tongue every 5 minutes as needed for Chest pain., Disp: , Rfl:     aspirin 81 MG tablet, Take 81 mg by mouth daily. , Disp: , Rfl:     pravastatin (PRAVACHOL) 40 MG tablet, Take 40 mg by mouth daily. , Disp: , Rfl:   Allergies: Duricef [cefadroxil]  Immunizations:   Immunization History   Administered Date(s) Administered    COVID-19, MODERNA BLUE border, Primary or Immunocompromised, (age 12y+), IM, 100 mcg/0.5mL 03/02/2021, 04/01/2021    Influenza Vaccine, unspecified formulation 12/21/2016    Influenza Virus Vaccine 12/02/2015    Influenza, FLUAD, (age 72 y+), Adjuvanted, 0.5mL 10/06/2020, 10/11/2021    Influenza, Triv, inactivated, subunit, adjuvanted, IM (Fluad 65 yrs and older) 11/29/2018, 12/16/2019    Pneumococcal Conjugate 13-valent ([id number]) 12/02/2015    Pneumococcal Polysaccharide ([id number]) 10/12/2011    Tdap (Boostrix, Adacel) 07/12/2015    Zoster Live (Zostavax) 10/13/2014        History of Present Illness:     Chelle had concerns including 6 Month Follow-Up and Discuss Labs. Farheen Jenkins  presents to the [address] [place] [address] today for;   Chief Complaint   Patient presents with    [address] [place] [address] Rd   , abnormal labs follow up and these conditions as she  Is looking today for:     1. Hypertensive heart disease with chronic systolic congestive heart failure ([address]    2. Primary osteoarthritis involving multiple joints    3. Osteoporosis, post-menopausal    4. LYLA (generalized anxiety disorder)    5. Chronic heart failure with preserved ejection fraction (HCC)    6. Essential hypertension    7. Homocysteinemia    8. Dyslipidemia    9. Chronic bilateral low back pain without sciatica    10. Osteopenia of multiple sites    11. Normocytic anemia    12. Low glucose level    13. Homocystinuria ([facility]    14. Other intestinal malabsorption    15. Hypothyroidism, unspecified type      HPI    Subjective:     Review of Systems   All other systems reviewed and are negative. Objective:     /66 (Site: Left Upper Arm, Position: Sitting, Cuff Size: Large Adult)   Pulse 73   Temp 97 °F (36.1 °C) (Temporal)   Resp 12   Ht 5' 3\" (1.6 m)   Wt 211 lb 9.6 oz (96 kg)   SpO2 97%   BMI 37.48 kg/m²   Physical Exam  Vitals and nursing note reviewed. Constitutional:       Appearance: Normal appearance. HENT:      Head: Normocephalic. Pulmonary:      Effort: Pulmonary effort is normal.   Neurological:      Mental Status: She is alert. Psychiatric:         Mood and Affect: Mood normal.         Thought Content: Thought content normal.          Laboratory Data:   Lab results were searched in Care Everywhere and/or those brought by the pateint were reviewed today with Shelby Griffiths and she has a copy of their most recent labs to take home with them as noted below;       Imaging Data:   Imaging Data:       Assessment & Plan:       Impression:  1. Hypertensive heart disease with chronic systolic congestive heart failure ([facility]    2. Primary osteoarthritis involving multiple joints    3. Osteoporosis, post-menopausal    4. LYLA (generalized anxiety disorder)    5. Chronic heart failure with preserved ejection fraction (HCC)    6. Essential hypertension    7. Homocysteinemia    8. Dyslipidemia    9. Chronic bilateral low back pain without sciatica    10. Osteopenia of multiple sites    11. Normocytic anemia    12. Low glucose level    13. Homocystinuria ([facility]    14. Other intestinal malabsorption    15. Hypothyroidism, unspecified type      Assessment and Plan:  After reviewing the patients chief complaints, reviewing their labfindings in great detail (with the patient and those accompanying them) which correlate to their chief complaints, symptoms, and or medical conditions; suggestions were made relating to changes in diet and or supplements which may improve the complaints and which will be reflected in their future lab findings; Chief Complaint   Patient presents with    [address]   ;    Plans for the next visits:  - Abnormal and non-optimal Labs were ordered today to be repeated in the next 120-365 days to assess changes from adjustments in nutrition and or nutrients.    - Patient instructed when having a blood draw to ask the  to divide their lab draws into multiple draws over several days if not feeling good at the time of the lab draw or if either prefers to do several smaller blood draws over several days  -Patient instructed to check with insurer before each lab draw and to go to the lab which the insurer directs them for the most cost effective lab draw with the least patient's cost  - Daniella Valenzuela  will be scheduled subsequent to those results. Molly Ponce will bring in her drink, food, supplement log to her next visit    Chronic Problems Addressed on this Visit:                                   1.  Intensity of Service; Uncontrolled items at this visit; Chief Complaint   Patient presents with    [address]   ; Improved items at this visit and Stable items were discussed at this visit;  2. Patients food, drinks, supplements and symptoms were reviewed with the patient,       - Gagilma Nicolas will bring food, drink, supplements and symptoms log to next visit for inclusion in their record      - 75 better food list reviewed & given to patient with the omega 6 food list to avoid      - The 52 Latex foods list was reviewed and given to the patients with the information on carrageenan         - Gluten in corn and oats abstracts sheet reviewed and given to the patient today   3.    Greater than 30 minutes time was spent with the patient face to face on this visit; of which >50% was for counseling and coordination of care, as well as the time spent before and after the visit reviewing the chart, documenting the encounter, reviewing labs,reports, NIH listed studies, making phone calls, etc.      Patients food and drinks were reviewed with the patient,   - They will bring a food drink symptom log to future visits for inclusion in their record    - 75 better food list reviewed & given to patient along with the omega 6 food list to avoid      - Gluten in corn and oats abstracts sheet reviewed and given to the patient today    - 23 Foods containing Latex-like proteins was reviewed and copy to be taken if desired - Nutrient Supplements list provided and copyto be taken if desired    - [email or web address]. com web site offered to patient to review at their convenience by staff with login information    Note:  I have discussed with the patient that with all nutraceuticals, there is often mixed data and emerging research which needs to be monitored; as well as an array of NIH fact sheets on nutrients and supplements, available at www.nih,issue plus Intellicyt. Ekinops plus www.Mercantilai,org. If I have recommended cinnamon at the request of this patient to assist them in control of their blood sugar, triglyceride, and/or weight issues. I discussed that the patient's clinical use of cinnamon bark, calcium, magnesium, Vitamin D, and pharmaceutical grade [employer] omega 3 oil or triple-strength fish oil, and B-50/B-100 time-released B-complex by [address] [facility] will be for a time-limited trial to determine their individual effectiveness and safety in this patient. I also referred the patient to the NMCD: Nutrition, Metabolism, and Cardiovascular Diseases (SecuritiesCard.pl) and concerns about long-term use and hepatotoxicity of cinnamon and other nutrients. I suggested they frequently search nih.gov for the latest non-proprietary information on nutriceuticals as well as consider a subscription to Get Fractal for details on reviewed supplements, or at the least review the nutrient files at [facility] at [facility], [address] bark, an insulin mimetic, reduces some High Carbohydrate Dietary Impacts. Methylhydroxychalcone polymers insulin-enhancing properties in fat cells are responsible for enhanced glucose uptake, inhibiting hepatic HMG-CoA reductase and lowers lipids. www.jacn. org/content/20/4/327.full     But cinnamon with additives such as Cinnamon Extract are not effective as insulin mimetics.  :eStoreDirectory.at     Nutrients for Start up from Local pharmacy or grocery for ease to get started now;  Gabriel Serna has some useable products;  - Triple Strength Fish Oil, enteric coated  - Vit D-3 5000 IU gel caps  - Iron ferrous sulfate 325 mg tabs  - Centrum Silver look-a-like for most patients, or  - Centrum plain look-a-like if need iron    Local pharmacies or chains such as [employer], have:  - Crzyfish pharmaceutical grade omega 3 is 90% EPA/DHA whereas most Triple strength fish oil are 75% EPA/DHA  - Triple Strength Fish Oil (enteric coated if available) or if not enteric coated, can take from freezer for less burps  - B-50 or B-100 released balanced B complex tabs by [address] [facility] bark 500 mg (without Chromium or extracts)   some brands list 1000 mg / serving of 2 capsules,    some brands have 1000 mg caps with the undesireable chromium extract  - Calcium carbonate/citrate, magnesium oxide/citrate, Vit D-3 as 3-4 tabs/caps/serving     Some Local Brands may contain Zinc which is acceptable for the first bottle or two  - Magnesium oxide 250 mg tabs for those having < 2 bowel movements daily  - Magnesium citrate 200 mg if having > 2 bowel movements/day  - Centrum Silver or look-a-like for most patients, Centrum plain or look-a-like with iron  - Vitamin D-3 comes as 1,000 IU or 2,000 IU or 5,000 IU gel caps or Liquid drops but keep Vitamin D levels <50 but >40     Some brands containing or derived from soy oil or corn oil are OK if not allergic to soy  - Elemental Iron 65 mg tabs at bedtime is available over the counter if need more iron     Usually turns bowel movements grey, green, or black but not a concern  - Apricot Kernel Oil (by Now) for dry skin sensitive perineal or perianal area skin    Nutrients for ongoing use by Mail order for less expense from www. Recurious ;  - Strength Fish Oil , 240 Softgels Item [id number]  -B-100 time released balanced B complex Item #[id number]  - Cinnamon bark 500 mg without Chromium or extract Item #[id number]  - Calcium carbonate 1000 mg, Magnesium oxide 500 mg, Vit D-3 400 IU Item #[id number]  - Magnesium oxide 500 mg tabs Item #665009 if less than 2 bowel movements daily  - ABC Seniors Item #445721 for most patients, One Daily Item #785942 with iron  - Vit D 3  1,000 Item #673115      2,000 IU Item #[id number]   Item #893204     Some brands containing orderived from soy oil or corn oil are OK if not allergic to soy    Nutrients for Special Needs by Mail order for less expense from www. puritan.com;  -Elemental Iron 65 mg tabs Item #919800 if need more iron for low iron on labs    Usually turns bowel movements grey, green or black but not a concern  - Time released Niacin 250 mg Item #905553 for cold intolerance, low libido or impotence  - DHEA 50 mg Item #905340 for improving DHEA levels on labs if having Fatigue    If stools too loose substitute for your Magnesium oxide using;   Magnesium citrate 200 mg tabs (NOT liquid) at [employer]   Magnesium gluconate 550 mg by Peggy at [employer] or Kähu. com  Magnesium chloride foot soaks or body sprays  www.Finexkap   Magnesium chloride flakes 14.99 Item #: [id number] if back-ordered, get spray  Magnesium threonate, Magtein also helps mental clarity and sleep    Food Drink Symptom Log;  I asked this patient to track these items and any other symptoms on their list on a weekly basis to documenttheir progress or lack of same.  This can be done on the symptom tracking sheet I gave them at today's visit but looks like this:                                                      Rate on scale of 0-10 with zero = not noticeable  Symptom:                            Week 1               2                 3                 4               Etc            Hair loss    Foot cramps    Paresthesia    Aches    IBS (irritable bowel)    Constipation    Diarrhea  Nocturia (up to bathroom at night)    Fatigue/Energy level  Stress      On the other side of the sheet they can parsley, peas, peaches, bell peppers, potatoes, radishes, summer raspberries, squash, sweetcorn, tomatoes, turnips, watermelons  August: apples, beans, beets, blueberries, cabbage, cantaloupe, carrots, cauliflower, celery, cucumbers, eggplant, grapes, green onions, greens, lettuce, onions, parsley, peas, peaches, pears, bell peppers, potatoes, radishes, squash, sweet corn, tomatoes, turnips, watermelons  September: apples, beans, beets, blueberries, cabbage, cantaloupe, carrots, cauliflower, celery, cucumbers, eggplant, grapes, green onions, greens, lettuce, onions, parsley, peas, peaches, pears, bell peppers, plums, potatoes, pumpkins, radishes, fall red raspberries, squash, sweet corn, tomatoes, turnips, watermelons  October: apples, beets, broccoli, cabbage, carrots, cauliflower, celery, green onions, greens, lettuce, parsley, peas, pears, potatoes, pumpkins, radishes, fall red raspberries, squash, turnips  November: broccoli, cabbage, carrots, parsley, pears, peas  December: use canned, frozen or dried fruits since lower in latex    Upto half of latex-sensitive patients show allergic reactions to fruits (avocados, bananas, kiwifruits, papayas, peaches),   Annals of Allergy, 1994. These plants contain the same proteins that are allergens in latex. People with fruit allergies should warn physicians before undergoing procedures which may cause anaphylactic reaction if in contact with latex gloves. Some of the common foods with defined cross-reactivity to latex are avocado, banana, kiwi, chestnut, raw potato, tomato, stone fruits (e.g., peach, cherry), hazelnut, melons, celery, carrot, apple, pear, papaya, and almond. Foods with less well-defined cross-reactivity to latex are peanuts, peppers, citrus fruits, coconut, pineapple, john, fig, passion fruit, Ugli fruit, and grape. This fruit/latex cross-reactivity is worsened by ethylene, a gas used to hasten commercial ripening.  In nature, plants produce low levels of the hormone ethylene, which regulates germination, flowering, and ripening. Forced ripening by high ethylene concentrations, plants produce allergenic wound-repair proteins, which are similar to wound-repair proteins made during the tapping of rubber trees. Sensitive individuals who ingest the fruit get a higher dose and worse reaction. Some people may even first become sensitized to latex through fruit. Can food processing increase the concentrations of allergenic proteins? Latex-sensitized children (and adults) in [place] often experience allergic reactions after eating bananas ripened artificially with ethylene. In the [place], food distribution centers treat unripe bananas and other produce with ethylene to ripen; not commonly done in [facility] since fruit is tree-ripened there. Does treatment of food with ethylene induce banana proteins that cross-react with latex? (Blayne et al.)    References:   Latex in Foods Allergy, http://ehp.niehs.nih.gov/members/2003/5811/5811.html    Search web for [employer] in Season \" for where you live or are at the time you food shop   Management of Latex, ://[email or web address].[email or web address]/  search for nih, latex-like proteins in foods

## 2022-10-05 LAB
THYROID PEROXIDASE ANTIBODY: 6.4 IU/ML (ref 0–25)
TTG, IGG: 0.7 U/ML

## 2022-10-08 LAB
DHEA UNCONJUGATED: 2.43 NG/ML (ref 0.63–4.7)
TESTOSTERONE, FREE W SHGB, FEMALES/CHILDREN: NORMAL

## 2022-10-10 NOTE — PROGRESS NOTES
Chief Complaint   Patient presents with    Follow-up     Chronic issues as noted below       History obtained from the patient. SUBJECTIVE:  Catalina Benítez is a 68 y.o. female that presents today for     -CHF PRIOR VISIT:   Patient admitted to [facility] from 5/17 to 5/22 for new onset HF. D/c summary:   None at time of my evalation    Last cardio PN:  Plan:   Patient with non ischemic chf   continue medical rx    will need life vest placement    ambulate    will see at the office few weeks       Since discharge has done well. No SOB, orthopnea or pND. Has life vest. Has not done off. Tolerating meds. Has f/u with cardioJose, next wk. Doing daily wts, stable. UPDATE PRIOR VISIT:   Overall doing better  Following with Jose  No CP/SOb  Pt told EF better, records pending, and off life vest.   Still having occ palp    UPDATE TODAY:   Repeat EF 55+%  No CP/sOB  On Entresto yet as well as BB and statin  Seeing Jose   Has f/u scheduled  wts steady  Feeling well      -HTN/CKD3:    HPI:     Taking meds as prescribed ?: yes  Tolerating well ?: yes  Side Effects ?: denies  BP at home ?: <140/90  Working on TLCS ?: yes  Chest Pain/SOB/Palpitations? denies    BP Readings from Last 3 Encounters:   10/11/22 110/60   10/04/22 106/66   05/25/22 126/74       -Homocystine PRIOr VISIT: noted on labs a few years ago. Not treated as far as she knows. Las labs 2016. UPDATE TODAY:   on folate   Has f/u labs with Jacqueline      -HLD:    HPI:  Labs UTD    Taking meds as prescribed ?: yes  Tolerating well ?: yes  Side Effects ?: denies  Muscle Pain?: denies  Working on TLCS ?: yes      -Chronic pain: has back issues and OA: uses tramadol. 60 tabs per 30 days. Was getting from sharifa Vega PCP. Had asked me to take over. OAARS reviewed and appropriate. UTD on refills. -LYLA: on zoloft, works well. No sI/HI. -Asthma: On prn combivent. Works well. Uses once per month.  PFT 2017 with min obstruction      -Osteoporosis:  New dx MAY 2022  Reclast written for but never setup, BMP still needs done  BMP needs r/o      Age/Gender Health Maintenance    Lipid -   Lab Results   Component Value Date    CHOL 181 10/27/2021    CHOL 178 10/21/2020    CHOL 183 01/10/2020     Lab Results   Component Value Date    TRIG 103 10/27/2021    TRIG 98 10/21/2020    TRIG 187 01/10/2020     Lab Results   Component Value Date    HDL 77 10/27/2021    HDL 67 10/21/2020    HDL 69 01/10/2020     Lab Results   Component Value Date    LDLCALC 83 10/27/2021    [address] 91 10/21/2020    [address] 77 01/10/2020     No results found for: LABVLDL, VLDL  No results found for: CHOLHDLRATIO    TSH -   Lab Results   Component Value Date    TSH 3.520 10/27/2021       DM Screen -   Lab Results   Component Value Date/Time    GLUCOSE 78 05/19/2022 11:38 AM    GLUCOSE 95 10/08/2019 12:59 PM       Colon Cancer Screening - neg with cecal ulcer AUG 2010, repeat 5 years per [facility] d/t fam hx; pt plans to call Dr. Doug Singh offce SEPT 2019/JAN 2020/OCT 2020/APR 2021/OCT 2021/APR 2022/OCT 2022  Lung Cancer Screening - never smoker    Tetanus - UTD July 2015  Influenza Vaccine - UTD OCT 2022  Pneumonia Vaccine - UTD PPV 23 and PCV 13  Zostavax - Zostavax OCT 2014   Shingrix - to get at pharmacy per medicare rules    Breast Cancer Screening - NEG MAY 2022    Osteoporosis Screening - July 2017:   FRAX 10 year probability of major osteoporotic fracture is 9.9%    and hip fracture is 1.7%. IMPRESSION: OSTEOPENIA   Patient is at medium risk for fracture. Patient consult    w/primary care provider is recommended. AUG 2019  BMD change vs previous is -4.0% for the hips. BMD change vs    previous is 3.0% for the spine. FRAX 10 year probability of major osteoporotic fracture is 11%    and hip fracture is 2.3%. IMPRESSION: OSTEOPENIA   Patient is at medium risk for fracture.         MAY 2022  Impression       This patient has osteoporosis using the World Health Organization criteria. The patient is at high risk for fracture. Specialist List:  Cardio: Gina Guerrero  CPAP/Pulm: Christen  Integrative Med: Milo Benitez      Current Outpatient Medications   Medication Sig Dispense Refill    zoledronic acid (RECLAST) 5 MG/100ML SOLN Infuse 100 mLs intravenously once for 1 dose 100 mL 0    Methylcobalamin 1000 MCG SUBL Place 1 tablet under the tongue 2 times daily 60 tablet 5    Levomefolate Glucosamine (METHYLFOLATE) 400 MCG CAPS Take 800 mcg by mouth 2 times daily 120 capsule 5    DHEA 10 MG TABS Take 0.5 tablets by mouth every morning (before breakfast) MCKENNA 5 mg tab at [facility]. [facility] 30 tablet 5    potassium chloride (KLOR-CON M) 10 MEQ extended release tablet Take 1 tablet by mouth twice daily 180 tablet 3    traMADol (ULTRAM) 50 MG tablet Take 1 tablet by mouth 2 times daily as needed for Pain for up to 90 days.  60 tablet 2    bumetanide (BUMEX) 2 MG tablet Take 1 tablet by mouth once daily 90 tablet 3    sertraline (ZOLOFT) 50 MG tablet Take 1 tablet by mouth once daily 90 tablet 3    Ascorbic Acid (VITAMIN C WITH VICTOR M HIPS) 500 MG tablet TAKE 2 TABLETS BY MOUTH ONCE DAILY      B Complex-Biotin-FA (B-COMPLEX PO)       albuterol-ipratropium (COMBIVENT RESPIMAT)  MCG/ACT AERS inhaler 1 puff      sacubitril-valsartan (ENTRESTO) 24-26 MG per tablet 1 Tablet      Omega-3 Fatty Acids (FISH OIL) 1200 MG CAPS 1 capsule      folic acid (FOLVITE) 067 MCG tablet 1 tablet      LACTOBACILLUS PO as directed      nitroGLYCERIN (NITROSTAT) 0.4 MG SL tablet       Cyanocobalamin (VITAMIN B12) 3000 MCG/ML LIQD as directed      Vitamins-Lipotropics (BALANCE B-100) TABS Take 1 tablet by mouth 2 times daily (before meals)       Omega 3 1000 MG CAPS Take 1 capsule by mouth 4 times daily (before meals and nightly) (Patient taking differently: Take 1 capsule by mouth 4 times daily (before meals and nightly) [id number] [facility]) 100 capsule 5    Cinnamon 500 MG CAPS Take 2 capsules by mouth 3 times daily       carvedilol (COREG) 6.25 MG tablet Take 1 tablet by mouth 2 times daily (with meals) 60 tablet 3    Cholecalciferol (VITAMIN D3) 2000 units CAPS Take 1 capsule by mouth daily Double Wednesday and Sunday(2 days will work)      cetirizine (ZYRTEC) 10 MG tablet Take 10 mg by mouth daily as needed for Allergies      Lactobacillus-Inulin ([address]) CAPS Take 1 capsule by mouth daily       nitroGLYCERIN (NITROSTAT) 0.4 MG SL tablet Place 0.4 mg under the tongue every 5 minutes as needed for Chest pain. aspirin 81 MG tablet Take 81 mg by mouth daily. pravastatin (PRAVACHOL) 40 MG tablet Take 40 mg by mouth daily. No current facility-administered medications for this visit. Orders Placed This Encounter   Medications    zoledronic acid (RECLAST) 5 MG/100ML SOLN     Sig: Infuse 100 mLs intravenously once for 1 dose     Dispense:  100 mL     Refill:  0           All medications reviewed and reconciled, including OTC and herbal medications. Updated list given to patient. Patient Active Problem List    Diagnosis Date Noted    Chronic renal disease, stage III [facility]) [399362] 05/25/2022     Priority: Medium    Chronic systolic (congestive) heart failure 05/25/2022     Priority: Medium    Homocystinuria ([facility] 04/04/2022    Tonsillar mass 10/06/2020    CKD (chronic kidney disease) stage 3, GFR 30-59 ml/min ([facility])     Heart failure with preserved ejection fraction ([facility] [address] 05/20/2019     Previously <30%, recovered. Hypertensive heart disease with congestive heart failure ([facility] [address] 05/17/2019    Controlled substance agreement signed; tramadol 03/27/2019    Asthma, mild intermittent     History of gastroesophageal reflux (GERD)     SUNI on CPAP      wears CPAP nightly      Osteoporosis, post-menopausal     Seasonal allergies     Osteoarthritis     CAD (coronary artery disease)      mild disease in small vessels per cath 2014.  Follows with Jose      Left amapro branch block     Hypertension     Dyslipidemia     Obesity (BMI 30-39. 9)     Vitamin D deficiency 05/24/2016    Homocysteinemia 04/18/2016    Chronic low back pain     LYLA (generalized anxiety disorder)        Past Medical History:   Diagnosis Date    Asthma, mild intermittent     CAD (coronary artery disease)     mild disease in small vessels per cath 2014. Follows with Jose    Chronic low back pain     CKD (chronic kidney disease) stage 3, GFR 30-59 ml/min ([facility])     Controlled substance agreement signed; tramadol 03/27/2019    Dyslipidemia     LYLA (generalized anxiety disorder)     Heart failure with preserved ejection fraction ([facility] [address] 05/20/2019    Previously <30%, recovered.      History of gastroesophageal reflux (GERD)     no longer on medication    Homocysteinemia 04/18/2016    Hypertension     Hypertensive heart disease with congestive heart failure ([facility] [address] 05/17/2019    Left bundle branch block     Obesity (BMI 30-39.9)     SUNI on CPAP     wears CPAP nightly    Osteoarthritis     Osteoporosis, post-menopausal     Seasonal allergies     Vitamin D deficiency 05/24/2016         Past Surgical History:   Procedure Laterality Date    CARDIAC CATHETERIZATION  10/30/14    Dr. Walker Rosado  08/2006    [address] [facility] Left 2009    COLONOSCOPY  2007    EYE SURGERY Left 04/23/2016    Dr. Earlene Navarrete Right 05/24/2006    Dr. Albaro Nguyen Bilateral 2006  & 2009    [address]  11/2009    SINUS SURGERY  1990         Allergies   Allergen Reactions    Duricef [Cefadroxil]          Social History     Tobacco Use    Smoking status: Never     Passive exposure: Yes    Smokeless tobacco: Never   Substance Use Topics    Alcohol use: No         Family History   Problem Relation Age of Onset    Heart Disease Mother     Cancer Father     Cancer Brother     Dementia Brother     Diabetes Maternal Grandmother     Heart Disease Maternal Grandmother     Glaucoma Brother          I have reviewed the patient's past medical history, past surgical history, allergies, medications, social and family history and I have made updates where appropriate. Review of Systems  Positive responses are highlighted in bold    Constitutional:  Fever, Chills, Night Sweats, Fatigue, Unexpected changes in weight  HENT:  Ear pain, Tinnitus, Nosebleeds, Trouble swallowing, Hearing loss, Sore throat  Cardiovascular:  Chest Pain, Palpitations, Orthopnea, Paroxysmal Nocturnal Dyspnea  Respiratory:  Cough, Wheezing, Shortness of breath, Chest tightness, Apnea  Gastrointestinal:  Nausea, Vomiting, Diarrhea, Constipation, Heartburn, Blood in stool  Genitourinary:  Difficulty or painful urination, Flank pain, Change in frequency, Urgency  Skin:  Color change, Rash, Itching, Wound  Musculoskeletal:  Joint pain, Back pain, Gait problems, Joint swelling, Myalgias  Neurological:  Dizziness, Headaches, Presyncope, Numbness, Seizures, Tremors  Endocrine:  Heat Intolerance, Cold Intolerance, Polydipsia, Polyphagia, Polyuria      PHYSICAL EXAM:  Vitals:    10/11/22 1330   BP: 110/60   Pulse: 80   Resp: 16   Temp: 97.6 °F (36.4 °C)   SpO2: 96%   Weight: 211 lb 3.2 oz (95.8 kg)   Height: 5' 3\" (1.6 m)     Body mass index is 37.41 kg/m². VS Reviewed  General Appearance: A&O x 3, No acute distress,well developed and well- nourished  Eyes: pupils equal, round, and reactive to light, extraocular eye movements intact, conjunctivae and eye lids without erythema  ENT: external ear and ear canal clear bilaterally, TMs intact and regular, nose without deformity, nasal mucosa and turbinates normal without polyps, oropharynx normal, dentition is normal for age.    Neck: supple and non-tender without mass, no thyromegaly or thyroid nodules, no cervical lymphadenopathy  Pulmonary/Chest: clear to auscultation bilaterally- no wheezes, rales or rhonchi, normal air movement, no respiratory distress or retractions  Cardiovascular: S1 and S2 auscultated w/ RRR. No murmurs, rubs, clicks, or gallops, distal pulses intact. Abdomen: soft, non-tender, non-distended, bowel sounds physiologic,  no rebound or guarding, no masses or hernias noted. Liver and spleen without enlargement. Extremities: no cyanosis, clubbing or edema of the lower extremities. +2 PT/DP bilaterally. Musculoskeletal: No joint swelling or gross deformity   Skin: warm and dry, no rash or erythema  Psych: Affect appropriate. Mood euthymic. Thought process is normal without evidence of depression or psychosis. Good insight and appropriate interaction. Cognition and memory appear to be intact. ASSESSMENT & PLAN  1. Chronic heart failure with preserved ejection fraction (HCC)    Doing well, EF improved  con't current meds  On BB, entresto and pravachol  Daily wts  bumex  F/u Cardio, Jose    2. Hypertensive heart disease with chronic systolic congestive heart failure ([facility] [address]    As above    3. Coronary artery disease involving native coronary artery of native heart without angina pectoris    As above  Stable  con't tertiary prevention    4. Essential hypertension    Stable  con't Entrestro and coreg    5. Stage 3b chronic kidney disease ([facility] [address]    As above    6. Homocysteinemia    con't folate  F/u integrative med    7. Dyslipidemia    Stable  con't pravachol  Labs UTD    8. Chronic bilateral low back pain without sciatica    Stable  Tramadol works well  Does 60 tabs per 30 days  OAARS is appropriate  Controlled substance agreement signed  May call for RF when needed    9. Primary osteoarthritis involving multiple joints    As above    10. Controlled substance agreement signed; tramadol    As above    11. LYLA (generalized anxiety disorder)    Stable  Doing well  con't zolof    12.  Mild intermittent asthma without complication    Stable  con't prn combivent  PFT UTD    13. Osteoporosis, post-menopausal    New dx  Get BMP  If appropriate, start reclast    - Basic Metabolic Panel; Future  - zoledronic acid (RECLAST) 5 MG/100ML SOLN; Infuse 100 mLs intravenously once for 1 dose  Dispense: 100 mL; Refill: 0    14. Needs flu shot    - Influenza, FLUAD, (age 72 y+), IM, Preservative Free, 0.5 mL      DISPOSITION    Return in about 6 months (around 4/11/2023) for AWV, follow-up on chronic medical conditions, sooner as needed. Lionel Lies released without restrictions. Future Appointments   Date Time Provider Harmony Dos Santos   4/18/2023 11:30 AM Duran Ledesma MD [facility]   5/24/2023  1:30 PM Yue Jamison MD [address] [place] [address] [facility] Stewart     PATIENT COUNSELING    Barriers to learning and self management: none    Discussed use, benefit, and side effects of prescribed medications. Barriers to medication compliance addressed. All patient questions answered. Pt voiced understanding.        Electronically signed by Chetan Mcmullen DO on 10/11/2022 at 1:53 PM

## 2022-10-11 ENCOUNTER — OFFICE VISIT (OUTPATIENT)
Dept: FAMILY MEDICINE CLINIC | Age: 77
End: 2022-10-11
Payer: MEDICARE

## 2022-10-11 VITALS
HEART RATE: 80 BPM | HEIGHT: 63 IN | TEMPERATURE: 97.6 F | RESPIRATION RATE: 16 BRPM | WEIGHT: 211.2 LBS | BODY MASS INDEX: 37.42 KG/M2 | OXYGEN SATURATION: 96 % | SYSTOLIC BLOOD PRESSURE: 110 MMHG | DIASTOLIC BLOOD PRESSURE: 60 MMHG

## 2022-10-11 DIAGNOSIS — G89.29 CHRONIC BILATERAL LOW BACK PAIN WITHOUT SCIATICA: ICD-10-CM

## 2022-10-11 DIAGNOSIS — F41.1 GAD (GENERALIZED ANXIETY DISORDER): ICD-10-CM

## 2022-10-11 DIAGNOSIS — M81.0 OSTEOPOROSIS, POST-MENOPAUSAL: ICD-10-CM

## 2022-10-11 DIAGNOSIS — I50.32 CHRONIC HEART FAILURE WITH PRESERVED EJECTION FRACTION (HCC): Primary | ICD-10-CM

## 2022-10-11 DIAGNOSIS — I25.10 CORONARY ARTERY DISEASE INVOLVING NATIVE CORONARY ARTERY OF NATIVE HEART WITHOUT ANGINA PECTORIS: ICD-10-CM

## 2022-10-11 DIAGNOSIS — Z23 NEEDS FLU SHOT: ICD-10-CM

## 2022-10-11 DIAGNOSIS — E78.5 DYSLIPIDEMIA: ICD-10-CM

## 2022-10-11 DIAGNOSIS — I10 ESSENTIAL HYPERTENSION: ICD-10-CM

## 2022-10-11 DIAGNOSIS — I50.22 HYPERTENSIVE HEART DISEASE WITH CHRONIC SYSTOLIC CONGESTIVE HEART FAILURE (HCC): ICD-10-CM

## 2022-10-11 DIAGNOSIS — N18.32 STAGE 3B CHRONIC KIDNEY DISEASE (HCC): ICD-10-CM

## 2022-10-11 DIAGNOSIS — Z79.899 CONTROLLED SUBSTANCE AGREEMENT SIGNED: ICD-10-CM

## 2022-10-11 DIAGNOSIS — I11.0 HYPERTENSIVE HEART DISEASE WITH CHRONIC SYSTOLIC CONGESTIVE HEART FAILURE (HCC): ICD-10-CM

## 2022-10-11 DIAGNOSIS — M54.50 CHRONIC BILATERAL LOW BACK PAIN WITHOUT SCIATICA: ICD-10-CM

## 2022-10-11 DIAGNOSIS — J45.20 MILD INTERMITTENT ASTHMA WITHOUT COMPLICATION: ICD-10-CM

## 2022-10-11 DIAGNOSIS — M15.9 PRIMARY OSTEOARTHRITIS INVOLVING MULTIPLE JOINTS: ICD-10-CM

## 2022-10-11 DIAGNOSIS — R79.83 HOMOCYSTEINEMIA: ICD-10-CM

## 2022-10-11 PROCEDURE — 1036F TOBACCO NON-USER: CPT | Performed by: FAMILY MEDICINE

## 2022-10-11 PROCEDURE — G8417 CALC BMI ABV UP PARAM F/U: HCPCS | Performed by: FAMILY MEDICINE

## 2022-10-11 PROCEDURE — 90694 VACC AIIV4 NO PRSRV 0.5ML IM: CPT | Performed by: FAMILY MEDICINE

## 2022-10-11 PROCEDURE — G8484 FLU IMMUNIZE NO ADMIN: HCPCS | Performed by: FAMILY MEDICINE

## 2022-10-11 PROCEDURE — 99214 OFFICE O/P EST MOD 30 MIN: CPT | Performed by: FAMILY MEDICINE

## 2022-10-11 PROCEDURE — 1123F ACP DISCUSS/DSCN MKR DOCD: CPT | Performed by: FAMILY MEDICINE

## 2022-10-11 PROCEDURE — 1090F PRES/ABSN URINE INCON ASSESS: CPT | Performed by: FAMILY MEDICINE

## 2022-10-11 PROCEDURE — G0008 ADMIN INFLUENZA VIRUS VAC: HCPCS | Performed by: FAMILY MEDICINE

## 2022-10-11 PROCEDURE — G8399 PT W/DXA RESULTS DOCUMENT: HCPCS | Performed by: FAMILY MEDICINE

## 2022-10-11 PROCEDURE — G8427 DOCREV CUR MEDS BY ELIG CLIN: HCPCS | Performed by: FAMILY MEDICINE

## 2022-10-11 RX ORDER — ZOLEDRONIC ACID 5 MG/100ML
5 INJECTION, SOLUTION INTRAVENOUS ONCE
Qty: 100 ML | Refills: 0 | Status: SHIPPED | OUTPATIENT
Start: 2022-10-11 | End: 2022-10-11

## 2022-10-11 NOTE — PATIENT INSTRUCTIONS
LAB INSTRUCTIONS:    Please complete labs within 2 week(s). Please fast for 8 hours prior to lab collection. The clinic will call you within 1 week of collection. If you have not heard from us within that amount of time, please call us at [phone].

## 2022-10-11 NOTE — PROGRESS NOTES
Vaccine Information Sheet, \"Influenza - Inactivated\"  given to Celestine Fountain, or parent/legal guardian of  Celestine Fountain and verbalized understanding. Patient responses:    Have you ever had a reaction to a flu vaccine? No  Do you have an allergy to eggs, neomycin or polymixin? No  Do you have an allergy to Thimerosal, contact lens solution, or Merthiolate? No  Have you ever had Guillian Prescott Syndrome? No  Do you have any current illness? No  Do you have a temperature above 100 degrees? No  Are you pregnant? No  If pregnant, permission obtained from physician? No  Do you have an active neurological disorder? No      Flu vaccine given per order. Please see immunization tab.

## 2022-10-20 ENCOUNTER — NURSE ONLY (OUTPATIENT)
Dept: LAB | Age: 77
End: 2022-10-20

## 2022-10-20 DIAGNOSIS — M81.0 OSTEOPOROSIS, POST-MENOPAUSAL: ICD-10-CM

## 2022-10-20 LAB
ANION GAP SERPL CALCULATED.3IONS-SCNC: 12 MEQ/L (ref 8–16)
BUN BLDV-MCNC: 24 MG/DL (ref 7–22)
CALCIUM SERPL-MCNC: 9.3 MG/DL (ref 8.5–10.5)
CHLORIDE BLD-SCNC: 105 MEQ/L (ref 98–111)
CO2: 25 MEQ/L (ref 23–33)
CREAT SERPL-MCNC: 1.3 MG/DL (ref 0.4–1.2)
GFR SERPL CREATININE-BSD FRML MDRD: 42 ML/MIN/1.73M2
GLUCOSE BLD-MCNC: 86 MG/DL (ref 70–108)
POTASSIUM SERPL-SCNC: 4.3 MEQ/L (ref 3.5–5.2)
SODIUM BLD-SCNC: 142 MEQ/L (ref 135–145)

## 2022-10-21 ENCOUNTER — TELEPHONE (OUTPATIENT)
Dept: FAMILY MEDICINE CLINIC | Age: 77
End: 2022-10-21

## 2022-10-21 NOTE — TELEPHONE ENCOUNTER
----- Message from Luis Torres, DO sent at 10/21/2022  7:23 AM EDT -----  Please let pt know that BMP looks good  Ok to get setup for reclast  Rx in tray by printer  Let me know if questions, thanks!

## 2022-10-21 NOTE — TELEPHONE ENCOUNTER
Pt informed and understanding with no further questions    Reclast packet on Dr. Mccann Britton desk to sign

## 2022-10-24 DIAGNOSIS — M81.0 OSTEOPOROSIS, UNSPECIFIED OSTEOPOROSIS TYPE, UNSPECIFIED PATHOLOGICAL FRACTURE PRESENCE: ICD-10-CM

## 2022-10-24 RX ORDER — SODIUM CHLORIDE 9 MG/ML
INJECTION, SOLUTION INTRAVENOUS CONTINUOUS
OUTPATIENT
Start: 2022-10-24

## 2022-10-24 RX ORDER — ONDANSETRON 2 MG/ML
8 INJECTION INTRAMUSCULAR; INTRAVENOUS
OUTPATIENT
Start: 2022-10-24

## 2022-10-24 RX ORDER — EPINEPHRINE 1 MG/ML
0.3 INJECTION, SOLUTION, CONCENTRATE INTRAVENOUS PRN
OUTPATIENT
Start: 2022-10-24

## 2022-10-24 RX ORDER — HEPARIN SODIUM (PORCINE) LOCK FLUSH IV SOLN 100 UNIT/ML 100 UNIT/ML
500 SOLUTION INTRAVENOUS PRN
OUTPATIENT
Start: 2022-10-24

## 2022-10-24 RX ORDER — ALBUTEROL SULFATE 90 UG/1
4 AEROSOL, METERED RESPIRATORY (INHALATION) PRN
OUTPATIENT
Start: 2022-10-24

## 2022-10-24 RX ORDER — SODIUM CHLORIDE 9 MG/ML
INJECTION, SOLUTION INTRAVENOUS ONCE
Status: CANCELLED | OUTPATIENT
Start: 2022-10-24 | End: 2022-10-24

## 2022-10-24 RX ORDER — ACETAMINOPHEN 325 MG/1
650 TABLET ORAL
OUTPATIENT
Start: 2022-10-24

## 2022-10-24 RX ORDER — SODIUM CHLORIDE 0.9 % (FLUSH) 0.9 %
5-40 SYRINGE (ML) INJECTION PRN
Status: CANCELLED | OUTPATIENT
Start: 2022-10-24

## 2022-10-24 RX ORDER — ZOLEDRONIC ACID 5 MG/100ML
5 INJECTION, SOLUTION INTRAVENOUS ONCE
Status: CANCELLED | OUTPATIENT
Start: 2022-10-24 | End: 2022-10-24

## 2022-10-24 RX ORDER — SODIUM CHLORIDE 9 MG/ML
5-250 INJECTION, SOLUTION INTRAVENOUS PRN
OUTPATIENT
Start: 2022-10-24

## 2022-10-24 RX ORDER — DIPHENHYDRAMINE HYDROCHLORIDE 50 MG/ML
50 INJECTION INTRAMUSCULAR; INTRAVENOUS
OUTPATIENT
Start: 2022-10-24

## 2022-10-31 ENCOUNTER — TELEPHONE (OUTPATIENT)
Dept: FAMILY MEDICINE CLINIC | Age: 77
End: 2022-10-31

## 2022-10-31 DIAGNOSIS — J01.90 ACUTE RHINOSINUSITIS: Primary | ICD-10-CM

## 2022-10-31 RX ORDER — FLUTICASONE PROPIONATE 50 MCG
1 SPRAY, SUSPENSION (ML) NASAL DAILY
Qty: 16 G | Refills: 0 | Status: SHIPPED | OUTPATIENT
Start: 2022-10-31 | End: 2022-11-14

## 2022-10-31 RX ORDER — BENZONATATE 100 MG/1
CAPSULE ORAL
Qty: 60 CAPSULE | Refills: 0 | Status: SHIPPED | OUTPATIENT
Start: 2022-10-31 | End: 2022-11-10

## 2022-10-31 RX ORDER — DOXYCYCLINE HYCLATE 100 MG
100 TABLET ORAL 2 TIMES DAILY
Qty: 20 TABLET | Refills: 0 | Status: SHIPPED | OUTPATIENT
Start: 2022-10-31 | End: 2022-11-10

## 2022-10-31 NOTE — TELEPHONE ENCOUNTER
Patient reports sinus infection sxs productive cough (green mucus), runny nose, sore throat, head pressure x 4-5 days taking OTC zyrtec with minimal to no relief and asking that something be sent to the [address]

## 2022-10-31 NOTE — TELEPHONE ENCOUNTER
F/u if no better     Diagnosis Orders   1.  Acute rhinosinusitis  doxycycline hyclate (VIBRA-TABS) 100 MG tablet    benzonatate (TESSALON PERLES) 100 MG capsule    fluticasone (FLONASE) 50 MCG/ACT nasal spray        Future Appointments   Date Time Provider Harmony Dos Santos   11/15/2022  1:30 PM STR EXAM ROOM 5 [facility]   4/11/2023  2:00 PM Scott Favre, DO [facility]   4/18/2023 11:30 AM Joseph Lara MD SRPX  RES [address]   5/24/2023  1:30 PM Ofelia Hung MD [address]

## 2022-11-10 PROBLEM — K90.89 OTHER INTESTINAL MALABSORPTION: Status: ACTIVE | Noted: 2022-11-10

## 2022-11-15 ENCOUNTER — HOSPITAL ENCOUNTER (OUTPATIENT)
Dept: NURSING | Age: 77
Discharge: HOME | End: 2022-11-15
Payer: MEDICARE

## 2022-11-15 VITALS
DIASTOLIC BLOOD PRESSURE: 54 MMHG | TEMPERATURE: 98.6 F | OXYGEN SATURATION: 96 % | SYSTOLIC BLOOD PRESSURE: 122 MMHG | RESPIRATION RATE: 20 BRPM | HEART RATE: 55 BPM

## 2022-11-15 DIAGNOSIS — M81.0 OSTEOPOROSIS, UNSPECIFIED OSTEOPOROSIS TYPE, UNSPECIFIED PATHOLOGICAL FRACTURE PRESENCE: Primary | ICD-10-CM

## 2022-11-15 PROCEDURE — 96365 THER/PROPH/DIAG IV INF INIT: CPT

## 2022-11-15 PROCEDURE — 6360000002 HC RX W HCPCS: Performed by: FAMILY MEDICINE

## 2022-11-15 PROCEDURE — 2580000003 HC RX 258: Performed by: FAMILY MEDICINE

## 2022-11-15 RX ORDER — SODIUM CHLORIDE 9 MG/ML
5-250 INJECTION, SOLUTION INTRAVENOUS PRN
OUTPATIENT
Start: 2022-11-15

## 2022-11-15 RX ORDER — SODIUM CHLORIDE 9 MG/ML
INJECTION, SOLUTION INTRAVENOUS ONCE
Status: COMPLETED | OUTPATIENT
Start: 2022-11-15 | End: 2022-11-15

## 2022-11-15 RX ORDER — SODIUM CHLORIDE 0.9 % (FLUSH) 0.9 %
5-40 SYRINGE (ML) INJECTION PRN
Status: DISCONTINUED | OUTPATIENT
Start: 2022-11-15 | End: 2022-11-16 | Stop reason: HOSPADM

## 2022-11-15 RX ORDER — SODIUM CHLORIDE 0.9 % (FLUSH) 0.9 %
5-40 SYRINGE (ML) INJECTION PRN
OUTPATIENT
Start: 2022-11-15

## 2022-11-15 RX ORDER — ACETAMINOPHEN 325 MG/1
650 TABLET ORAL
OUTPATIENT
Start: 2022-11-15

## 2022-11-15 RX ORDER — SODIUM CHLORIDE 9 MG/ML
INJECTION, SOLUTION INTRAVENOUS ONCE
Status: CANCELLED | OUTPATIENT
Start: 2022-11-15 | End: 2022-11-15

## 2022-11-15 RX ORDER — ZOLEDRONIC ACID 5 MG/100ML
5 INJECTION, SOLUTION INTRAVENOUS ONCE
Status: CANCELLED | OUTPATIENT
Start: 2022-11-15 | End: 2022-11-15

## 2022-11-15 RX ORDER — ALBUTEROL SULFATE 90 UG/1
4 AEROSOL, METERED RESPIRATORY (INHALATION) PRN
OUTPATIENT
Start: 2022-11-15

## 2022-11-15 RX ORDER — ONDANSETRON 2 MG/ML
8 INJECTION INTRAMUSCULAR; INTRAVENOUS
OUTPATIENT
Start: 2022-11-15

## 2022-11-15 RX ORDER — HEPARIN SODIUM (PORCINE) LOCK FLUSH IV SOLN 100 UNIT/ML 100 UNIT/ML
500 SOLUTION INTRAVENOUS PRN
OUTPATIENT
Start: 2022-11-15

## 2022-11-15 RX ORDER — DIPHENHYDRAMINE HYDROCHLORIDE 50 MG/ML
50 INJECTION INTRAMUSCULAR; INTRAVENOUS
OUTPATIENT
Start: 2022-11-15

## 2022-11-15 RX ORDER — ZOLEDRONIC ACID 5 MG/100ML
5 INJECTION, SOLUTION INTRAVENOUS ONCE
Status: COMPLETED | OUTPATIENT
Start: 2022-11-15 | End: 2022-11-15

## 2022-11-15 RX ORDER — SODIUM CHLORIDE 9 MG/ML
INJECTION, SOLUTION INTRAVENOUS CONTINUOUS
OUTPATIENT
Start: 2022-11-15

## 2022-11-15 RX ADMIN — ZOLEDRONIC ACID 5 MG: 0.05 INJECTION, SOLUTION INTRAVENOUS at 13:16

## 2022-11-15 RX ADMIN — SODIUM CHLORIDE: 9 INJECTION, SOLUTION INTRAVENOUS at 13:16

## 2022-11-15 NOTE — PROGRESS NOTES
1308 pt arrives ambulatory for reclast infusion. Infusion explained and questions answered. PT RIGHTS AND RESPONSIBILITIES OFFERED TO PT. Pt meets criteria for reclast infusion. 1331 infusion complete. Pt tolerated it well with no complaints. 1400 pt discharged ambulatory with instructions with no complaints.              __m__ Safety:       (Environmental)  [place] to environment  Ensure ID band is correct and in place/ allergy band as needed  Assess for fall risk  Initiate fall precautions as applicable (fall band, side rails, etc.)  Call light within reach  Bed in low position/ wheels locked    _m___ Pain:       Assess pain level and characteristics  Administer analgesics as ordered  Assess effectiveness of pain management and report to MD as needed    ___m_ Knowledge Deficit:  Assess baseline knowledge  Provide teaching at level of understanding  Provide teaching via preferred learning method  Evaluate teaching effectiveness    __m__ Hemodynamic/Respiratory Status:       (Pre and Post Procedure Monitoring)  Assess/Monitor vital signs and LOC  Assess Baseline SpO2 prior to any sedation  Obtain weight/height  Assess vital signs/ LOC until patient meets discharge criteria  Monitor procedure site and notify MD of any issues

## 2022-11-23 DIAGNOSIS — Z79.899 CONTROLLED SUBSTANCE AGREEMENT SIGNED: ICD-10-CM

## 2022-11-23 DIAGNOSIS — M15.9 PRIMARY OSTEOARTHRITIS INVOLVING MULTIPLE JOINTS: Primary | ICD-10-CM

## 2022-11-23 RX ORDER — TRAMADOL HYDROCHLORIDE 50 MG/1
50 TABLET ORAL 2 TIMES DAILY PRN
Qty: 60 TABLET | Refills: 2 | Status: SHIPPED | OUTPATIENT
Start: 2022-11-23 | End: 2023-02-21

## 2022-11-23 NOTE — TELEPHONE ENCOUNTER
Recent Visits  Date Type Provider Dept   10/11/22 Office Visit Burton Eisenmenger, DO [facility]   10/04/22 Office Visit Stephanie Eller MD [facility]   04/11/22 Office Visit Burton Eisenmenger, [place] [facility]   04/04/22 Office Visit Stephanie Eller MD [facility]   11/11/21 Office Visit Stephanie Eller MD [facility]   10/11/21 Office Visit Burton Eisenmenger, [place] [facility]   08/11/21 Office Visit Stephanie Eller MD [facility]   06/28/21 Office Visit Burton Eisenmenger, DO [facility]   Showing recent visits within past 540 days with a meds authorizing provider and meeting all other requirements  Future Appointments  Date Type Provider Dept   04/11/23 Appointment Burton Eisenmenger, DO [facility]   04/18/23 Appointment Stephanie Eller MD [facility]   Showing future appointments within next 150 days with a meds authorizing provider and meeting all other requirements     Future Appointments   Date Time Provider Harmony Dos Santos   4/11/2023  2:00 PM Burton Eisenmenger, [address] [place] [address]   4/18/2023 11:30 AM Stephanie Eller MD [facility] [address] [facility]   5/24/2023  1:30 PM Reginia Schwab, MD [address] [facility] [address] [facility]

## 2022-11-23 NOTE — TELEPHONE ENCOUNTER
ASSESSMENT & PLAN   Diagnosis Orders   1. Primary osteoarthritis involving multiple joints  traMADol (ULTRAM) 50 MG tablet      2. Controlled substance agreement signed; tramadol  traMADol (ULTRAM) 50 MG tablet          OAARS reviewed and appropriate. Controlled Substances Monitoring: Periodic Controlled Substance Monitoring: Possible medication side effects, risk of tolerance/dependence & alternative treatments discussed., No signs of potential drug abuse or diversion identified. , Assessed functional status., Obtaining appropriate analgesic effect of treatment.  Luis Torres, DO)      Future Appointments   Date Time Provider Harmony Delgadilloi   4/11/2023  2:00 PM Luis Torres [facility] [place] [address]   4/18/2023 11:30 AM Sabra [place] MD [facility]  [facility] [place] [address]   5/24/2023  1:30 PM Yeni Worley MD [address] [place]

## 2022-12-13 RX ORDER — PRAVASTATIN SODIUM 40 MG
TABLET ORAL
Qty: 90 TABLET | Refills: 1 | Status: SHIPPED | OUTPATIENT
Start: 2022-12-13

## 2023-01-10 RX ORDER — CARVEDILOL 6.25 MG/1
TABLET ORAL
Qty: 180 TABLET | Refills: 1 | Status: SHIPPED | OUTPATIENT
Start: 2023-01-10

## 2023-03-06 DIAGNOSIS — Z79.899 CONTROLLED SUBSTANCE AGREEMENT SIGNED: ICD-10-CM

## 2023-03-06 DIAGNOSIS — M15.9 PRIMARY OSTEOARTHRITIS INVOLVING MULTIPLE JOINTS: Primary | ICD-10-CM

## 2023-03-06 RX ORDER — TRAMADOL HYDROCHLORIDE 50 MG/1
50 TABLET ORAL 2 TIMES DAILY PRN
Qty: 60 TABLET | Refills: 2 | Status: SHIPPED | OUTPATIENT
Start: 2023-03-06 | End: 2023-06-04

## 2023-03-06 NOTE — TELEPHONE ENCOUNTER
ASSESSMENT & PLAN   Diagnosis Orders   1. Primary osteoarthritis involving multiple joints  traMADol (ULTRAM) 50 MG tablet      2. Controlled substance agreement signed; tramadol  traMADol (ULTRAM) 50 MG tablet          OAARS reviewed and appropriate. Controlled Substances Monitoring: Periodic Controlled Substance Monitoring: Possible medication side effects, risk of tolerance/dependence & alternative treatments discussed., No signs of potential drug abuse or diversion identified.  Zoraida Aviles, DO)      Future Appointments   Date Time Provider Harmony Raya   4/11/2023  2:00 PM Zoraida Aviles [place] [facility]   4/18/2023 11:30 AM Jude Clark MD [facility]   5/24/2023  1:30 PM Seferino Small MD [address] [facility] [facility]

## 2023-03-06 NOTE — TELEPHONE ENCOUNTER
Recent Visits  Date Type Provider Dept   10/11/22 Office Visit Alla Khan DO [facility]   10/04/22 Office Visit Carol Lantigua MD [facility]   04/11/22 Office Visit Alla Khan [place] [facility]   04/04/22 Office Visit Carol Lantigua MD [facility]   11/11/21 Office Visit Carol Lantigua MD [facility]   10/11/21 Office Visit Alla Khan DO [facility]   Showing recent visits within past 540 days with a meds authorizing provider and meeting all other requirements  Future Appointments  Date Type Provider Dept   04/11/23 Appointment Alla Khan DO [facility]   04/18/23 Appointment Carol Lantigua MD [facility]   Showing future appointments within next 150 days with a meds authorizing provider and meeting all other requirements     Future Appointments   Date Time Provider Harmony Dos Santos   4/11/2023  2:00 PM Alla Khan [address] [place] [address]   4/18/2023 11:30 AM Carol Lantigua MD [facility] [place] [address]   5/24/2023  1:30 PM Kristi Winslow MD [address] [place] [address]

## 2023-04-03 ENCOUNTER — NURSE ONLY (OUTPATIENT)
Dept: LAB | Age: 78
End: 2023-04-03

## 2023-04-03 DIAGNOSIS — I50.22 HYPERTENSIVE HEART DISEASE WITH CHRONIC SYSTOLIC CONGESTIVE HEART FAILURE (HCC): ICD-10-CM

## 2023-04-03 DIAGNOSIS — E78.5 DYSLIPIDEMIA (HIGH LDL; LOW HDL): ICD-10-CM

## 2023-04-03 DIAGNOSIS — I25.10 CORONARY ARTERY DISEASE INVOLVING NATIVE CORONARY ARTERY OF NATIVE HEART WITHOUT ANGINA PECTORIS: ICD-10-CM

## 2023-04-03 DIAGNOSIS — G89.29 CHRONIC BILATERAL LOW BACK PAIN WITHOUT SCIATICA: ICD-10-CM

## 2023-04-03 DIAGNOSIS — E03.9 HYPOTHYROIDISM, UNSPECIFIED TYPE: ICD-10-CM

## 2023-04-03 DIAGNOSIS — M15.9 PRIMARY OSTEOARTHRITIS INVOLVING MULTIPLE JOINTS: ICD-10-CM

## 2023-04-03 DIAGNOSIS — M81.0 OSTEOPOROSIS, POST-MENOPAUSAL: ICD-10-CM

## 2023-04-03 DIAGNOSIS — K90.89 OTHER INTESTINAL MALABSORPTION: ICD-10-CM

## 2023-04-03 DIAGNOSIS — E72.11 HOMOCYSTINURIA (HCC): ICD-10-CM

## 2023-04-03 DIAGNOSIS — I10 ESSENTIAL HYPERTENSION: ICD-10-CM

## 2023-04-03 DIAGNOSIS — E78.5 DYSLIPIDEMIA: ICD-10-CM

## 2023-04-03 DIAGNOSIS — M54.50 CHRONIC BILATERAL LOW BACK PAIN WITHOUT SCIATICA: ICD-10-CM

## 2023-04-03 DIAGNOSIS — I11.0 HYPERTENSIVE HEART DISEASE WITH CHRONIC SYSTOLIC CONGESTIVE HEART FAILURE (HCC): ICD-10-CM

## 2023-04-03 DIAGNOSIS — R79.83 HOMOCYSTEINEMIA: ICD-10-CM

## 2023-04-03 DIAGNOSIS — I50.32 CHRONIC HEART FAILURE WITH PRESERVED EJECTION FRACTION (HCC): ICD-10-CM

## 2023-04-03 DIAGNOSIS — M85.89 OSTEOPENIA OF MULTIPLE SITES: ICD-10-CM

## 2023-04-03 DIAGNOSIS — F41.1 GAD (GENERALIZED ANXIETY DISORDER): ICD-10-CM

## 2023-04-03 DIAGNOSIS — R73.09 LOW GLUCOSE LEVEL: ICD-10-CM

## 2023-04-03 DIAGNOSIS — R53.83 OTHER FATIGUE: ICD-10-CM

## 2023-04-03 DIAGNOSIS — D64.9 NORMOCYTIC ANEMIA: ICD-10-CM

## 2023-04-03 LAB
25(OH)D3 SERPL-MCNC: 56 NG/ML (ref 30–100)
ALBUMIN SERPL BCG-MCNC: 4.4 G/DL (ref 3.5–5.1)
ALP SERPL-CCNC: 64 U/L (ref 38–126)
ALT SERPL W/O P-5'-P-CCNC: 13 U/L (ref 11–66)
ANION GAP SERPL CALC-SCNC: 11 MEQ/L (ref 8–16)
AST SERPL-CCNC: 22 U/L (ref 5–40)
BASOPHILS ABSOLUTE: 0.1 THOU/MM3 (ref 0–0.1)
BASOPHILS NFR BLD AUTO: 1.4 %
BILIRUB CONJ SERPL-MCNC: < 0.2 MG/DL (ref 0–0.3)
BILIRUB SERPL-MCNC: 0.9 MG/DL (ref 0.3–1.2)
BUN SERPL-MCNC: 23 MG/DL (ref 7–22)
CALCIUM SERPL-MCNC: 9.2 MG/DL (ref 8.5–10.5)
CHLORIDE SERPL-SCNC: 106 MEQ/L (ref 98–111)
CHOLEST SERPL-MCNC: 179 MG/DL (ref 100–199)
CO2 SERPL-SCNC: 25 MEQ/L (ref 23–33)
CREAT SERPL-MCNC: 1.5 MG/DL (ref 0.4–1.2)
DEPRECATED MEAN GLUCOSE BLD GHB EST-ACNC: 90 MG/DL (ref 70–126)
DEPRECATED RDW RBC AUTO: 44.1 FL (ref 35–45)
EOSINOPHIL NFR BLD AUTO: 14.1 %
EOSINOPHILS ABSOLUTE: 0.8 THOU/MM3 (ref 0–0.4)
ERYTHROCYTE [DISTWIDTH] IN BLOOD BY AUTOMATED COUNT: 12.5 % (ref 11.5–14.5)
ERYTHROCYTE [SEDIMENTATION RATE] IN BLOOD BY WESTERGREN METHOD: 40 MM/HR (ref 0–20)
GFR SERPL CREATININE-BSD FRML MDRD: 35 ML/MIN/1.73M2
GLUCOSE SERPL-MCNC: 83 MG/DL (ref 70–108)
HBA1C MFR BLD HPLC: 5 % (ref 4.4–6.4)
HCT VFR BLD AUTO: 35.7 % (ref 37–47)
HDLC SERPL-MCNC: 78 MG/DL
HGB BLD-MCNC: 11.5 GM/DL (ref 12–16)
IMM GRANULOCYTES # BLD AUTO: 0.02 THOU/MM3 (ref 0–0.07)
IMM GRANULOCYTES NFR BLD AUTO: 0.3 %
LDLC SERPL CALC-MCNC: 83 MG/DL
LYMPHOCYTES ABSOLUTE: 1.9 THOU/MM3 (ref 1–4.8)
LYMPHOCYTES NFR BLD AUTO: 32 %
MAGNESIUM SERPL-MCNC: 2.3 MG/DL (ref 1.6–2.4)
MCH RBC QN AUTO: 30.8 PG (ref 26–33)
MCHC RBC AUTO-ENTMCNC: 32.2 GM/DL (ref 32.2–35.5)
MCV RBC AUTO: 95.7 FL (ref 81–99)
MONOCYTES ABSOLUTE: 0.4 THOU/MM3 (ref 0.4–1.3)
MONOCYTES NFR BLD AUTO: 6.8 %
NEUTROPHILS NFR BLD AUTO: 45.4 %
NRBC BLD AUTO-RTO: 0 /100 WBC
PLATELET # BLD AUTO: 224 THOU/MM3 (ref 130–400)
PMV BLD AUTO: 9.6 FL (ref 9.4–12.4)
POTASSIUM SERPL-SCNC: 4 MEQ/L (ref 3.5–5.2)
PROT SERPL-MCNC: 7.6 G/DL (ref 6.1–8)
PTH-INTACT SERPL-MCNC: 108.5 PG/ML (ref 15–65)
RBC # BLD AUTO: 3.73 MILL/MM3 (ref 4.2–5.4)
SEGMENTED NEUTROPHILS ABSOLUTE COUNT: 2.7 THOU/MM3 (ref 1.8–7.7)
SODIUM SERPL-SCNC: 142 MEQ/L (ref 135–145)
TRIGL SERPL-MCNC: 88 MG/DL (ref 0–199)
WBC # BLD AUTO: 5.9 THOU/MM3 (ref 4.8–10.8)

## 2023-04-04 LAB
C-REACTIVE PROTEIN, HIGH SENSITIVITY: 1.4 MG/L
DHEA-S SERPL-MCNC: 98.7 UG/DL (ref 10–90)
ESTRADIOL LEVEL: 18.9 PG/ML (ref 5–50)
HOMOCYSTEINE: 24.7 UMOL/L

## 2023-04-06 LAB
THYROPEROXIDASE AB SERPL IA-ACNC: < 4 IU/ML (ref 0–25)
TTG IGA SER IA-ACNC: 0.3 U/ML
TTG IGG SER IA-ACNC: 0.9 U/ML

## 2023-04-09 LAB
DHEA SERPL-MCNC: 2.44 NG/ML (ref 0.63–4.7)
TESTOSTERONE, FREE W SHGB, FEMALES/CHILDREN: NORMAL

## 2023-04-10 PROBLEM — E72.11 HOMOCYSTINURIA (HCC): Status: RESOLVED | Noted: 2022-04-04 | Resolved: 2023-04-10

## 2023-04-10 PROBLEM — K90.89 OTHER INTESTINAL MALABSORPTION: Status: RESOLVED | Noted: 2022-11-10 | Resolved: 2023-04-10

## 2023-04-18 ENCOUNTER — OFFICE VISIT (OUTPATIENT)
Dept: FAMILY MEDICINE CLINIC | Age: 78
End: 2023-04-18
Payer: MEDICARE

## 2023-04-18 ENCOUNTER — NURSE ONLY (OUTPATIENT)
Dept: LAB | Age: 78
End: 2023-04-18

## 2023-04-18 VITALS
SYSTOLIC BLOOD PRESSURE: 116 MMHG | RESPIRATION RATE: 12 BRPM | HEART RATE: 79 BPM | DIASTOLIC BLOOD PRESSURE: 66 MMHG | BODY MASS INDEX: 37.35 KG/M2 | TEMPERATURE: 97.3 F | HEIGHT: 63 IN | WEIGHT: 210.8 LBS | OXYGEN SATURATION: 97 %

## 2023-04-18 DIAGNOSIS — R79.83 HOMOCYSTEINEMIA: ICD-10-CM

## 2023-04-18 DIAGNOSIS — E66.01 SEVERE OBESITY (BMI 35.0-39.9) WITH COMORBIDITY (HCC): ICD-10-CM

## 2023-04-18 DIAGNOSIS — D50.8 OTHER IRON DEFICIENCY ANEMIA: ICD-10-CM

## 2023-04-18 DIAGNOSIS — K90.89 OTHER INTESTINAL MALABSORPTION: ICD-10-CM

## 2023-04-18 DIAGNOSIS — D50.8 OTHER IRON DEFICIENCY ANEMIA: Primary | ICD-10-CM

## 2023-04-18 LAB
IRON SERPL-MCNC: 51 UG/DL (ref 50–170)
TIBC SERPL-MCNC: 270 UG/DL (ref 171–450)

## 2023-04-18 PROCEDURE — G8417 CALC BMI ABV UP PARAM F/U: HCPCS | Performed by: FAMILY MEDICINE

## 2023-04-18 PROCEDURE — 1123F ACP DISCUSS/DSCN MKR DOCD: CPT | Performed by: FAMILY MEDICINE

## 2023-04-18 PROCEDURE — 3074F SYST BP LT 130 MM HG: CPT | Performed by: FAMILY MEDICINE

## 2023-04-18 PROCEDURE — 99214 OFFICE O/P EST MOD 30 MIN: CPT | Performed by: FAMILY MEDICINE

## 2023-04-18 PROCEDURE — 1036F TOBACCO NON-USER: CPT | Performed by: FAMILY MEDICINE

## 2023-04-18 PROCEDURE — 1090F PRES/ABSN URINE INCON ASSESS: CPT | Performed by: FAMILY MEDICINE

## 2023-04-18 PROCEDURE — G8399 PT W/DXA RESULTS DOCUMENT: HCPCS | Performed by: FAMILY MEDICINE

## 2023-04-18 PROCEDURE — G8427 DOCREV CUR MEDS BY ELIG CLIN: HCPCS | Performed by: FAMILY MEDICINE

## 2023-04-18 PROCEDURE — 3078F DIAST BP <80 MM HG: CPT | Performed by: FAMILY MEDICINE

## 2023-04-18 RX ORDER — L-METHYLFOLATE-ALGAE-VIT B12-B6 CAP 3-90.314-2-35 MG 3-90.314-2-35 MG
1 CAP ORAL
Qty: 60 CAPSULE | Refills: 5 | Status: SHIPPED | OUTPATIENT
Start: 2023-04-18

## 2023-04-18 RX ORDER — PHENOL 1.4 %
1 AEROSOL, SPRAY (ML) MUCOUS MEMBRANE 2 TIMES DAILY
COMMUNITY

## 2023-04-18 RX ORDER — THIAMINE HCL 100 MG
1 TABLET ORAL DAILY
COMMUNITY

## 2023-04-18 NOTE — PATIENT INSTRUCTIONS
Thank you   Thank you for trusting us with your healthcare needs. You may receive a survey regarding today's visit. It would help us out if you would take a few moments to provide your feedback. We value your input. Please bring in ALL medications BOTTLES, including inhalers, herbal supplements, over the counter, prescribed & non-prescribed medicine. The office would like actual medication bottles and a list.   Please note our OFFICE POLICIES:   Prior to getting your labs drawn, please check with your insurance company for benefits and eligibility of lab services. Often, insurance companies cover certain tests for preventative visits only. It is patient's responsibility to see what is covered. We are unable to change a diagnosis after the test has been performed. Lab orders will not be re-printed. Please hold onto your original lab orders and take them to your lab to be completed. If you no show your scheduled appointment three times, you will be dismissed from this practice. Reschedules must be completed 24 hours prior to your schedule appointment. If the list below has been completed, PLEASE FAX RECORDS TO OUR OFFICE @ [phone].  Once the records have been received we will update your records at our office:  Health Maintenance Due   Topic Date Due    Shingles vaccine (2 of 3) 12/08/2014    Colorectal Cancer Screen  08/17/2015    COVID-19 Vaccine (3 - Booster for Marzette Dykes series) 05/27/2021

## 2023-04-18 NOTE — PROGRESS NOTES
http://ehp.niehs.nih.gov/members/2003/5811/5811.html    Search web for [employer] in [place] \" for where you live or are at the time you food shop   Management of [employer], ://medicalcenter. [facility].[email or web address]/  search for nih, latex-like proteins in foods

## 2023-04-21 ENCOUNTER — NURSE ONLY (OUTPATIENT)
Dept: LAB | Age: 78
End: 2023-04-21

## 2023-04-21 DIAGNOSIS — E66.01 SEVERE OBESITY (BMI 35.0-39.9) WITH COMORBIDITY (HCC): ICD-10-CM

## 2023-04-21 DIAGNOSIS — D50.8 OTHER IRON DEFICIENCY ANEMIA: ICD-10-CM

## 2023-04-21 DIAGNOSIS — R79.83 HOMOCYSTEINEMIA: ICD-10-CM

## 2023-04-21 DIAGNOSIS — K90.89 OTHER INTESTINAL MALABSORPTION: ICD-10-CM

## 2023-04-21 LAB — HEMOCCULT STL QL: NEGATIVE

## 2023-04-23 DIAGNOSIS — F41.1 GAD (GENERALIZED ANXIETY DISORDER): Chronic | ICD-10-CM

## 2023-04-23 LAB — H PYLORI AG STL QL IA: NORMAL

## 2023-04-24 NOTE — TELEPHONE ENCOUNTER
Recent Visits  Date Type Provider Dept   04/18/23 Office Visit Jad Hussein [facility]   [facility]   10/11/22 Office Visit Kavin Mitchell DO [facility]   10/04/22 Office Visit Jad Hussein [facility]   [facility] [place]   04/04/22 Office Visit Jad Hussein MD [facility]   11/11/21 Office Visit Jad Hussein MD [facility]   Showing recent visits within past 540 days with a meds authorizing provider and meeting all other requirements  Future Appointments  No visits were found meeting these conditions.   Showing future appointments within next 150 days with a meds authorizing provider and meeting all other requirements     Future Appointments   Date Time Provider [place] Dos [place]   5/24/2023  1:30 PM Masha Adam MD [facility] - [facility]   10/11/2023  2:20 PM Kavin Mitchell, [address] [place] [address]

## 2023-05-11 RX ORDER — SACUBITRIL AND VALSARTAN 24; 26 MG/1; MG/1
TABLET, FILM COATED ORAL
Qty: 60 TABLET | Refills: 0 | Status: SHIPPED | OUTPATIENT
Start: 2023-05-11

## 2023-06-09 RX ORDER — PRAVASTATIN SODIUM 40 MG
TABLET ORAL
Qty: 90 TABLET | Refills: 0 | Status: SHIPPED | OUTPATIENT
Start: 2023-06-09

## 2023-06-16 DIAGNOSIS — Z79.899 CONTROLLED SUBSTANCE AGREEMENT SIGNED: ICD-10-CM

## 2023-06-16 DIAGNOSIS — M15.9 PRIMARY OSTEOARTHRITIS INVOLVING MULTIPLE JOINTS: Primary | ICD-10-CM

## 2023-06-19 ENCOUNTER — TELEPHONE (OUTPATIENT)
Dept: CARDIOLOGY CLINIC | Age: 78
End: 2023-06-19

## 2023-06-19 DIAGNOSIS — I10 HYPERTENSION, UNSPECIFIED TYPE: Primary | ICD-10-CM

## 2023-06-19 DIAGNOSIS — E78.5 HYPERLIPIDEMIA, UNSPECIFIED HYPERLIPIDEMIA TYPE: ICD-10-CM

## 2023-06-19 RX ORDER — TRAMADOL HYDROCHLORIDE 50 MG/1
50 TABLET ORAL 2 TIMES DAILY PRN
Qty: 60 TABLET | Refills: 2 | Status: SHIPPED | OUTPATIENT
Start: 2023-06-19 | End: 2023-09-17

## 2023-06-19 NOTE — TELEPHONE ENCOUNTER
ASSESSMENT & PLAN   Diagnosis Orders   1. Primary osteoarthritis involving multiple joints  traMADol (ULTRAM) 50 MG tablet      2. Controlled substance agreement signed; tramadol  traMADol (ULTRAM) 50 MG tablet          OAARS reviewed and appropriate. Controlled Substances Monitoring: Periodic Controlled Substance Monitoring: No signs of potential drug abuse or diversion identified. , Possible medication side effects, risk of tolerance/dependence & alternative treatments discussed. , Assessed functional status., Obtaining appropriate analgesic effect of treatment.  Cony Gar, DO)      Future Appointments   Date Time Provider [facility]   6/22/2023  1:45 PM Pardeep Ngo MD [facility] - [facility]   10/11/2023  2:20 PM Cony Gar, [address] [place] [address]

## 2023-06-19 NOTE — TELEPHONE ENCOUNTER
Recent Visits  Date Type Provider Dept   04/18/23 Office Visit Gilberto Bolden MD [facility]   [facility]   10/11/22 Office Visit Teresa Conner DO [facility]   10/04/22 Office Visit Gilberto Bolden MD [facility]   [facility] [place]   04/04/22 Office Visit Gilberto Bolden MD [facility]   Showing recent visits within past 540 days with a meds authorizing provider and meeting all other requirements  Future Appointments  Date Type Provider Dept   10/11/23 Appointment Teresa Conner DO [facility]   Showing future appointments within next 150 days with a meds authorizing provider and meeting all other requirements      Future Appointments   Date Time Provider [place]ony Dos [place]   6/22/2023  1:45 PM Pardeep Romero MD [facility] - [address] [facility]   10/11/2023  2:20 PM Teresa Conner, [address] [place] [address]

## 2023-06-20 ENCOUNTER — NURSE ONLY (OUTPATIENT)
Dept: LAB | Age: 78
End: 2023-06-20

## 2023-06-20 DIAGNOSIS — I10 HYPERTENSION, UNSPECIFIED TYPE: ICD-10-CM

## 2023-06-20 DIAGNOSIS — E78.5 HYPERLIPIDEMIA, UNSPECIFIED HYPERLIPIDEMIA TYPE: ICD-10-CM

## 2023-06-20 LAB
ALBUMIN SERPL BCG-MCNC: 4 G/DL (ref 3.5–5.1)
ALP SERPL-CCNC: 57 U/L (ref 38–126)
ALT SERPL W/O P-5'-P-CCNC: 11 U/L (ref 11–66)
ANION GAP SERPL CALC-SCNC: 15 MEQ/L (ref 8–16)
AST SERPL-CCNC: 22 U/L (ref 5–40)
BILIRUB CONJ SERPL-MCNC: < 0.2 MG/DL (ref 0–0.3)
BILIRUB SERPL-MCNC: 0.8 MG/DL (ref 0.3–1.2)
BUN SERPL-MCNC: 27 MG/DL (ref 7–22)
CALCIUM SERPL-MCNC: 9 MG/DL (ref 8.5–10.5)
CHLORIDE SERPL-SCNC: 105 MEQ/L (ref 98–111)
CO2 SERPL-SCNC: 22 MEQ/L (ref 23–33)
CREAT SERPL-MCNC: 1.4 MG/DL (ref 0.4–1.2)
GFR SERPL CREATININE-BSD FRML MDRD: 39 ML/MIN/1.73M2
GLUCOSE SERPL-MCNC: 91 MG/DL (ref 70–108)
POTASSIUM SERPL-SCNC: 4.5 MEQ/L (ref 3.5–5.2)
PROT SERPL-MCNC: 7 G/DL (ref 6.1–8)
SODIUM SERPL-SCNC: 142 MEQ/L (ref 135–145)

## 2023-06-21 ENCOUNTER — PATIENT MESSAGE (OUTPATIENT)
Dept: FAMILY MEDICINE CLINIC | Age: 78
End: 2023-06-21

## 2023-06-21 NOTE — TELEPHONE ENCOUNTER
From: Haresh Forrester  To: Dr. Hugo Ala: 6/21/2023 2:14 AM EDT  Subject: Michelle Carlson, a quick question for you. Gala Galvan had a bowel movement Tuesday morning and it appeared to have blood in it. Would it be possible to run a stool sample to see what is going on? Please let me know your thoughts. Thank you so much.   Pascual Portillo

## 2023-06-22 ENCOUNTER — OFFICE VISIT (OUTPATIENT)
Dept: CARDIOLOGY CLINIC | Age: 78
End: 2023-06-22
Payer: MEDICARE

## 2023-06-22 VITALS
SYSTOLIC BLOOD PRESSURE: 110 MMHG | HEIGHT: 63 IN | HEART RATE: 66 BPM | BODY MASS INDEX: 35.79 KG/M2 | WEIGHT: 202 LBS | RESPIRATION RATE: 18 BRPM | DIASTOLIC BLOOD PRESSURE: 59 MMHG

## 2023-06-22 DIAGNOSIS — I50.22 HYPERTENSIVE HEART DISEASE WITH CHRONIC SYSTOLIC CONGESTIVE HEART FAILURE (HCC): ICD-10-CM

## 2023-06-22 DIAGNOSIS — I50.1 HEART FAILURE, LEFT, WITH LVEF <=30% (HCC): ICD-10-CM

## 2023-06-22 DIAGNOSIS — I25.10 CORONARY ARTERY DISEASE INVOLVING NATIVE CORONARY ARTERY OF NATIVE HEART WITHOUT ANGINA PECTORIS: Primary | Chronic | ICD-10-CM

## 2023-06-22 DIAGNOSIS — I11.0 HYPERTENSIVE HEART DISEASE WITH CHRONIC SYSTOLIC CONGESTIVE HEART FAILURE (HCC): ICD-10-CM

## 2023-06-22 PROCEDURE — G8417 CALC BMI ABV UP PARAM F/U: HCPCS | Performed by: INTERNAL MEDICINE

## 2023-06-22 PROCEDURE — 1036F TOBACCO NON-USER: CPT | Performed by: INTERNAL MEDICINE

## 2023-06-22 PROCEDURE — G8399 PT W/DXA RESULTS DOCUMENT: HCPCS | Performed by: INTERNAL MEDICINE

## 2023-06-22 PROCEDURE — 1090F PRES/ABSN URINE INCON ASSESS: CPT | Performed by: INTERNAL MEDICINE

## 2023-06-22 PROCEDURE — G8427 DOCREV CUR MEDS BY ELIG CLIN: HCPCS | Performed by: INTERNAL MEDICINE

## 2023-06-22 PROCEDURE — 3078F DIAST BP <80 MM HG: CPT | Performed by: INTERNAL MEDICINE

## 2023-06-22 PROCEDURE — 99214 OFFICE O/P EST MOD 30 MIN: CPT | Performed by: INTERNAL MEDICINE

## 2023-06-22 PROCEDURE — 1123F ACP DISCUSS/DSCN MKR DOCD: CPT | Performed by: INTERNAL MEDICINE

## 2023-06-22 PROCEDURE — 3074F SYST BP LT 130 MM HG: CPT | Performed by: INTERNAL MEDICINE

## 2023-06-22 PROCEDURE — 93000 ELECTROCARDIOGRAM COMPLETE: CPT | Performed by: INTERNAL MEDICINE

## 2023-06-22 RX ORDER — SACUBITRIL AND VALSARTAN 24; 26 MG/1; MG/1
1 TABLET, FILM COATED ORAL 2 TIMES DAILY
Qty: 90 TABLET | Refills: 3 | Status: SHIPPED | OUTPATIENT
Start: 2023-06-22

## 2023-06-22 RX ORDER — CARVEDILOL 6.25 MG/1
6.25 TABLET ORAL 2 TIMES DAILY
Qty: 180 TABLET | Refills: 3 | Status: SHIPPED | OUTPATIENT
Start: 2023-06-22

## 2023-06-22 RX ORDER — NITROGLYCERIN 0.4 MG/1
0.4 TABLET SUBLINGUAL EVERY 5 MIN PRN
Qty: 25 TABLET | Refills: 3 | Status: SHIPPED | OUTPATIENT
Start: 2023-06-22

## 2023-06-22 RX ORDER — POTASSIUM CHLORIDE 750 MG/1
TABLET, EXTENDED RELEASE ORAL
Qty: 180 TABLET | Refills: 3 | Status: SHIPPED | OUTPATIENT
Start: 2023-06-22

## 2023-06-22 RX ORDER — BUMETANIDE 2 MG/1
2 TABLET ORAL DAILY
Qty: 90 TABLET | Refills: 3 | Status: SHIPPED | OUTPATIENT
Start: 2023-06-22

## 2023-06-22 RX ORDER — PRAVASTATIN SODIUM 40 MG
40 TABLET ORAL DAILY
Qty: 90 TABLET | Refills: 3 | Status: SHIPPED | OUTPATIENT
Start: 2023-06-22

## 2023-06-22 ASSESSMENT — ENCOUNTER SYMPTOMS
CHOKING: 0
CHEST TIGHTNESS: 0
STRIDOR: 0
NAUSEA: 0
ANAL BLEEDING: 0
SHORTNESS OF BREATH: 0
COLOR CHANGE: 0
VOMITING: 0
TROUBLE SWALLOWING: 0
APNEA: 0
BLOOD IN STOOL: 0
COUGH: 0
WHEEZING: 0
ABDOMINAL PAIN: 0
ABDOMINAL DISTENTION: 0
VOICE CHANGE: 0

## 2023-06-22 NOTE — PROGRESS NOTES
heart without angina pectoris  [address] - [place] ELECTROCARDIOGRAM, COMPLETE    CBC    Basic Metabolic Panel    Lipid Panel    Hepatic Function Panel      2. Hypertensive heart disease with chronic systolic congestive heart failure (HCC)  potassium chloride (KLOR-CON M) 10 MEQ extended release tablet    bumetanide (BUMEX) 2 MG tablet    CBC    Basic Metabolic Panel    Lipid Panel    Hepatic Function Panel      3. Heart failure, left, with LVEF <=30% (HCC)  potassium chloride (KLOR-CON M) 10 MEQ extended release tablet    bumetanide (BUMEX) 2 MG tablet    CBC    Basic Metabolic Panel    Lipid Panel    Hepatic Function Panel           Assessment/Plan    Patient 66years old lady who has a history of hypertension hypercholesterolemia obesity and sleep apnea she is here for a follow-up her major concern is that she is feeling weak and tired. She has to take care of her  who is very much dependent on her for his activity patient denies chest pain the patient blood pressure has been under control medication will be reconciled and sent to her pharmacy lab findings were discussed with her EKG findings discussed with her all her questions were answered to his satisfaction cardiac wise she is stable continue current medications seen periodically thank    Orders Placed This Encounter   Procedures    CBC     Standing Status:   Future     Standing Expiration Date:   4/09/9436    Basic Metabolic Panel     Standing Status:   Future     Standing Expiration Date:   6/22/2024    Lipid Panel     Standing Status:   Future     Standing Expiration Date:   6/22/2024     Order Specific Question:   Is Patient Fasting?/# of Hours     Answer:   12 hours    Hepatic Function Panel     Standing Status:   Future     Standing Expiration Date:   6/22/2024    [address] - [place] ELECTROCARDIOGRAM, COMPLETE       Return in about 1 year (around 6/22/2024) for chf.      Chris Ashley MD

## 2023-09-01 ENCOUNTER — PATIENT MESSAGE (OUTPATIENT)
Dept: FAMILY MEDICINE CLINIC | Age: 78
End: 2023-09-01

## 2023-09-01 DIAGNOSIS — J40 BRONCHITIS: Primary | ICD-10-CM

## 2023-09-01 RX ORDER — BENZONATATE 100 MG/1
CAPSULE ORAL
Qty: 60 CAPSULE | Refills: 0 | Status: SHIPPED | OUTPATIENT
Start: 2023-09-01 | End: 2023-09-11

## 2023-09-01 RX ORDER — DOXYCYCLINE HYCLATE 100 MG
100 TABLET ORAL 2 TIMES DAILY
Qty: 20 TABLET | Refills: 0 | Status: SHIPPED | OUTPATIENT
Start: 2023-09-01 | End: 2023-09-11

## 2023-09-01 NOTE — TELEPHONE ENCOUNTER
From: Tejas Love  To: Dr. La Mcfadden: 9/1/2023 9:28 AM EDT  Subject: Camille Velasquezs - 17-    Hi Dr Dyllan Richardson,  My sinuses are sore, I feel like I have a cotton ball in my throat, headache & coughing. Very tired. BP - 110/60  PULSE - 61  TEMP - 98.8  Would it be possible to get an antibiotic to clear it up?   Thank Felipa Gil

## 2023-09-21 DIAGNOSIS — M15.9 PRIMARY OSTEOARTHRITIS INVOLVING MULTIPLE JOINTS: Primary | ICD-10-CM

## 2023-09-21 DIAGNOSIS — Z79.899 CONTROLLED SUBSTANCE AGREEMENT SIGNED: ICD-10-CM

## 2023-09-21 RX ORDER — TRAMADOL HYDROCHLORIDE 50 MG/1
50 TABLET ORAL 2 TIMES DAILY PRN
Qty: 60 TABLET | Refills: 2 | Status: SHIPPED | OUTPATIENT
Start: 2023-09-21 | End: 2023-12-20

## 2023-09-21 NOTE — TELEPHONE ENCOUNTER
Future Appointments   Date Time Provider [address]   10/11/2023  2:20 PM Esteban Parks DO [facility]   6/20/2024  1:45 PM [facility]

## 2023-09-21 NOTE — TELEPHONE ENCOUNTER
ASSESSMENT & PLAN   Diagnosis Orders   1. Primary osteoarthritis involving multiple joints  traMADol (ULTRAM) 50 MG tablet      2. Controlled substance agreement signed; tramadol  traMADol (ULTRAM) 50 MG tablet          OAARS reviewed and appropriate. Controlled Substances Monitoring: Periodic Controlled Substance Monitoring: Possible medication side effects, risk of tolerance/dependence & alternative treatments discussed., No signs of potential drug abuse or diversion identified.  Sonya Durán DO)      Future Appointments   Date Time Provider [address] [place] [address]   10/11/2023  2:20 PM [facility]   6/20/2024  1:45 PM Joni Ashley MD [facility]

## 2023-10-02 NOTE — CARE COORDINATION
5/17/19, 10:24 AM      Ashleigh Villeda       Admitted from: ED 5/16/2019/ [facility] day: 0   Location: 4A-19/019-A Reason for admit: Pulmonary edema, acute ([facility] [address] [J81.0] Status: IP  Admit order signed?: yes  PMH:  has a past medical history of Asthma, mild intermittent, CAD (coronary artery disease), Chronic low back pain, Controlled substance agreement signed; tramadol, Dyslipidemia, LYLA (generalized anxiety disorder), History of gastroesophageal reflux (GERD), Homocysteinemia ([facility] [address] Hypertension, Left bundle branch block, Obesity (BMI 30-39.9), SUNI on CPAP, Osteoarthritis, Osteopenia, Seasonal allergies, and Vitamin D deficiency. Procedure: ECHO pending  Pertinent abnormal Imaging:CTA Chest W WO    Impression:         No acute pulmonary embolism. Bilateral multilobar patchy groundglass infiltrates with interlobular septal thickening probably representing pulmonary edema. Atypical pneumonia felt to be less likely. CXR   Impression:          Mild interstitial infiltrates with patchy confluence at the right base likely representing pulmonary edema, less likely an atypical pneumonia. Medications:  Scheduled Meds:   sodium chloride flush  10 mL Intravenous 2 times per day    enoxaparin  40 mg Subcutaneous Daily    aspirin  81 mg Oral Daily    diltiazem  120 mg Oral Daily    fluticasone  2 spray Nasal Daily    isosorbide mononitrate  30 mg Oral Daily    pravastatin  40 mg Oral Nightly    sertraline  50 mg Oral Daily    furosemide  20 mg Intravenous BID    carvedilol  3.125 mg Oral BID WC     Continuous Infusions:   Pertinent Info/Orders/Treatment Plan:  BNP 1379.0, BUN 23, IV Lasix, telemetry. Diet: DIET CARDIAC;   Smoking status:  reports that she is a non-smoker but has been exposed to tobacco smoke.  She has never used smokeless tobacco.   PCP: Romana Spell, MD  Readmission: no  Readmission Risk Score: 9%    Discharge Planning  Current Residence:  Private Residence  Living Arrangements: Please find out when last shot was and if she has any pens left. May need to space out to every 10 day or take 1 mg every 7 day to space out til refill available. Check [facility] near you. We have had some luck with them having.

## 2023-10-10 PROBLEM — N18.30 CHRONIC RENAL DISEASE, STAGE III (HCC): Status: RESOLVED | Noted: 2022-05-25 | Resolved: 2023-10-10

## 2023-10-10 PROBLEM — E66.01 SEVERE OBESITY (BMI 35.0-39.9) WITH COMORBIDITY (HCC): Status: RESOLVED | Noted: 2023-04-18 | Resolved: 2023-10-10

## 2023-10-10 PROBLEM — I50.22 CHRONIC SYSTOLIC (CONGESTIVE) HEART FAILURE (HCC): Status: RESOLVED | Noted: 2022-05-25 | Resolved: 2023-10-10

## 2023-10-10 PROBLEM — M81.0 OSTEOPOROSIS: Status: RESOLVED | Noted: 2022-10-24 | Resolved: 2023-10-10

## 2023-10-10 NOTE — PATIENT INSTRUCTIONS
LAB INSTRUCTIONS:    Please complete labs after 10/15/2023    Please fast for 8 hours prior to lab collection. The clinic will call you within 1 week of collection. If you have not heard from us within that amount of time, please call us at [phone].

## 2023-10-11 ENCOUNTER — OFFICE VISIT (OUTPATIENT)
Dept: FAMILY MEDICINE CLINIC | Age: 78
End: 2023-10-11
Payer: MEDICARE

## 2023-10-11 VITALS
DIASTOLIC BLOOD PRESSURE: 68 MMHG | SYSTOLIC BLOOD PRESSURE: 124 MMHG | BODY MASS INDEX: 35.97 KG/M2 | TEMPERATURE: 97 F | HEIGHT: 63 IN | HEART RATE: 77 BPM | RESPIRATION RATE: 18 BRPM | OXYGEN SATURATION: 96 % | WEIGHT: 203 LBS

## 2023-10-11 DIAGNOSIS — G89.29 CHRONIC BILATERAL LOW BACK PAIN WITHOUT SCIATICA: ICD-10-CM

## 2023-10-11 DIAGNOSIS — N18.32 STAGE 3B CHRONIC KIDNEY DISEASE (HCC): ICD-10-CM

## 2023-10-11 DIAGNOSIS — M15.9 PRIMARY OSTEOARTHRITIS INVOLVING MULTIPLE JOINTS: ICD-10-CM

## 2023-10-11 DIAGNOSIS — Z79.899 CONTROLLED SUBSTANCE AGREEMENT SIGNED: ICD-10-CM

## 2023-10-11 DIAGNOSIS — I50.22 HYPERTENSIVE HEART DISEASE WITH CHRONIC SYSTOLIC CONGESTIVE HEART FAILURE (HCC): ICD-10-CM

## 2023-10-11 DIAGNOSIS — E78.5 DYSLIPIDEMIA: ICD-10-CM

## 2023-10-11 DIAGNOSIS — R79.83 HOMOCYSTEINEMIA: ICD-10-CM

## 2023-10-11 DIAGNOSIS — J45.20 MILD INTERMITTENT ASTHMA WITHOUT COMPLICATION: ICD-10-CM

## 2023-10-11 DIAGNOSIS — Z23 NEED FOR INFLUENZA VACCINATION: ICD-10-CM

## 2023-10-11 DIAGNOSIS — I50.32 CHRONIC HEART FAILURE WITH PRESERVED EJECTION FRACTION (HCC): Primary | ICD-10-CM

## 2023-10-11 DIAGNOSIS — F41.1 GAD (GENERALIZED ANXIETY DISORDER): ICD-10-CM

## 2023-10-11 DIAGNOSIS — I11.0 HYPERTENSIVE HEART DISEASE WITH CHRONIC SYSTOLIC CONGESTIVE HEART FAILURE (HCC): ICD-10-CM

## 2023-10-11 DIAGNOSIS — I10 ESSENTIAL HYPERTENSION: ICD-10-CM

## 2023-10-11 DIAGNOSIS — M54.50 CHRONIC BILATERAL LOW BACK PAIN WITHOUT SCIATICA: ICD-10-CM

## 2023-10-11 DIAGNOSIS — I25.10 CORONARY ARTERY DISEASE INVOLVING NATIVE CORONARY ARTERY OF NATIVE HEART WITHOUT ANGINA PECTORIS: ICD-10-CM

## 2023-10-11 DIAGNOSIS — M81.0 OSTEOPOROSIS, POST-MENOPAUSAL: ICD-10-CM

## 2023-10-11 PROCEDURE — G0008 ADMIN INFLUENZA VIRUS VAC: HCPCS | Performed by: FAMILY MEDICINE

## 2023-10-11 PROCEDURE — 1090F PRES/ABSN URINE INCON ASSESS: CPT | Performed by: FAMILY MEDICINE

## 2023-10-11 PROCEDURE — G8484 FLU IMMUNIZE NO ADMIN: HCPCS | Performed by: FAMILY MEDICINE

## 2023-10-11 PROCEDURE — 90694 VACC AIIV4 NO PRSRV 0.5ML IM: CPT | Performed by: FAMILY MEDICINE

## 2023-10-11 PROCEDURE — G8427 DOCREV CUR MEDS BY ELIG CLIN: HCPCS | Performed by: FAMILY MEDICINE

## 2023-10-11 PROCEDURE — G8417 CALC BMI ABV UP PARAM F/U: HCPCS | Performed by: FAMILY MEDICINE

## 2023-10-11 PROCEDURE — 1036F TOBACCO NON-USER: CPT | Performed by: FAMILY MEDICINE

## 2023-10-11 PROCEDURE — 1123F ACP DISCUSS/DSCN MKR DOCD: CPT | Performed by: FAMILY MEDICINE

## 2023-10-11 PROCEDURE — 3074F SYST BP LT 130 MM HG: CPT | Performed by: FAMILY MEDICINE

## 2023-10-11 PROCEDURE — 99214 OFFICE O/P EST MOD 30 MIN: CPT | Performed by: FAMILY MEDICINE

## 2023-10-11 PROCEDURE — G8399 PT W/DXA RESULTS DOCUMENT: HCPCS | Performed by: FAMILY MEDICINE

## 2023-10-11 PROCEDURE — 3078F DIAST BP <80 MM HG: CPT | Performed by: FAMILY MEDICINE

## 2023-10-11 RX ORDER — ZOLEDRONIC ACID 5 MG/100ML
5 INJECTION, SOLUTION INTRAVENOUS ONCE
Qty: 100 ML | Refills: 0 | Status: SHIPPED | OUTPATIENT
Start: 2023-10-11 | End: 2023-10-11

## 2023-10-11 NOTE — PROGRESS NOTES
Vaccine Information Sheet, \"Influenza - Inactivated\"  given to Scott Reis, or parent/legal guardian of  Scott Reis and verbalized understanding. Patient responses:    Have you ever had a reaction to a flu vaccine? No  Do you have an allergy to eggs, neomycin or polymixin? No  Do you have an allergy to Thimerosal, contact lens solution, or Merthiolate? No  Have you ever had [place] Syndrome? No  Do you have any current illness? No  Do you have a temperature above 100 degrees? No  Are you pregnant? No  If pregnant, permission obtained from physician? No  Do you have an active neurological disorder? No      Flu vaccine given per order. Please see immunization tab.
oropharynx normal, dentition is normal for age. Neck: supple and non-tender without mass, no thyromegaly or thyroid nodules, no cervical lymphadenopathy  Pulmonary/Chest: clear to auscultation bilaterally- no wheezes, rales or rhonchi, normal air movement, no respiratory distress or retractions  Cardiovascular: S1 and S2 auscultated w/ RRR. No murmurs, rubs, clicks, or gallops, distal pulses intact. Abdomen: soft, non-tender, non-distended, bowel sounds physiologic,  no rebound or guarding, no masses or hernias noted. Liver and spleen without enlargement. Extremities: no cyanosis, clubbing or edema of the lower extremities. +2 PT/DP bilaterally. Musculoskeletal: No joint swelling or gross deformity   Skin: warm and dry, no rash or erythema  Psych: Affect appropriate. Mood euthymic. Thought process is normal without evidence of depression or psychosis. Good insight and appropriate interaction. Cognition and memory appear to be intact. ASSESSMENT & PLAN  1. Chronic heart failure with preserved ejection fraction (HCC)    Doing well, EF improved  con't current meds  On BB, entresto and pravachol  Daily wts  bumex  F/u Cardio, Jose    2. Hypertensive heart disease with chronic systolic congestive heart failure ([address])    As above    3. Coronary artery disease involving native coronary artery of native heart without angina pectoris    As above  Stable  con't tertiary prevention    4. Essential hypertension    Stable  con't Entrestro and coreg    5. Stage 3b chronic kidney disease (HCC)    Stable  con't Entrestro and coreg    6. Homocysteinemia    con't folate  F/u integrative med    7. Dyslipidemia    Stable  con't pravachol  Labs UTD    8. Chronic bilateral low back pain without sciatica    Stable  Tramadol works well  Does 60 tabs per 30 days  OAARS is appropriate  Controlled substance agreement signed  May call for RF when needed    9. Primary osteoarthritis involving multiple joints    As above    10.

## 2023-10-23 ENCOUNTER — NURSE ONLY (OUTPATIENT)
Dept: LAB | Age: 78
End: 2023-10-23

## 2023-10-23 DIAGNOSIS — M81.0 OSTEOPOROSIS, POST-MENOPAUSAL: ICD-10-CM

## 2023-10-23 LAB
ANION GAP SERPL CALC-SCNC: 12 MEQ/L (ref 8–16)
BUN SERPL-MCNC: 43 MG/DL (ref 7–22)
CALCIUM SERPL-MCNC: 9.3 MG/DL (ref 8.5–10.5)
CHLORIDE SERPL-SCNC: 105 MEQ/L (ref 98–111)
CO2 SERPL-SCNC: 25 MEQ/L (ref 23–33)
CREAT SERPL-MCNC: 1.5 MG/DL (ref 0.4–1.2)
GFR SERPL CREATININE-BSD FRML MDRD: 35 ML/MIN/1.73M2
GLUCOSE SERPL-MCNC: 92 MG/DL (ref 70–108)
POTASSIUM SERPL-SCNC: 4.5 MEQ/L (ref 3.5–5.2)
SODIUM SERPL-SCNC: 142 MEQ/L (ref 135–145)

## 2023-10-24 ENCOUNTER — TELEPHONE (OUTPATIENT)
Dept: FAMILY MEDICINE CLINIC | Age: 78
End: 2023-10-24

## 2023-10-24 NOTE — TELEPHONE ENCOUNTER
Patient has been informed and voiced understanding  Will get Dr. Tasia Seals signature on reclast order and get it faxed to  outpatient nursing @[phone].

## 2023-10-24 NOTE — TELEPHONE ENCOUNTER
----- Message from Elmer Tena DO sent at 10/23/2023  6:08 PM EDT -----  Please let pt know that BMP is stable. Ok to schedule Reclast, order should be on Maria Esther's desk  Let me know if questions, thanks!

## 2023-10-25 DIAGNOSIS — M81.0 SENILE OSTEOPOROSIS: Primary | ICD-10-CM

## 2023-10-25 RX ORDER — DIPHENHYDRAMINE HYDROCHLORIDE 50 MG/ML
50 INJECTION INTRAMUSCULAR; INTRAVENOUS
OUTPATIENT
Start: 2023-10-30

## 2023-10-25 RX ORDER — ALBUTEROL SULFATE 90 UG/1
4 AEROSOL, METERED RESPIRATORY (INHALATION) PRN
OUTPATIENT
Start: 2023-10-30

## 2023-10-25 RX ORDER — EPINEPHRINE 1 MG/ML
0.3 INJECTION, SOLUTION, CONCENTRATE INTRAVENOUS PRN
OUTPATIENT
Start: 2023-10-30

## 2023-10-25 RX ORDER — ONDANSETRON 2 MG/ML
8 INJECTION INTRAMUSCULAR; INTRAVENOUS
OUTPATIENT
Start: 2023-10-30

## 2023-10-25 RX ORDER — ZOLEDRONIC ACID 5 MG/100ML
5 INJECTION, SOLUTION INTRAVENOUS ONCE
OUTPATIENT
Start: 2023-10-30 | End: 2023-10-30

## 2023-10-25 RX ORDER — SODIUM CHLORIDE 9 MG/ML
5-250 INJECTION, SOLUTION INTRAVENOUS PRN
OUTPATIENT
Start: 2023-10-30

## 2023-10-25 RX ORDER — HEPARIN 100 UNIT/ML
500 SYRINGE INTRAVENOUS PRN
OUTPATIENT
Start: 2023-10-30

## 2023-10-25 RX ORDER — SODIUM CHLORIDE 9 MG/ML
INJECTION, SOLUTION INTRAVENOUS CONTINUOUS
OUTPATIENT
Start: 2023-10-30

## 2023-10-25 RX ORDER — SODIUM CHLORIDE 0.9 % (FLUSH) 0.9 %
5-40 SYRINGE (ML) INJECTION PRN
OUTPATIENT
Start: 2023-10-30

## 2023-10-25 RX ORDER — ACETAMINOPHEN 325 MG/1
650 TABLET ORAL
OUTPATIENT
Start: 2023-10-30

## 2023-10-26 ENCOUNTER — CLINICAL DOCUMENTATION (OUTPATIENT)
Facility: HOSPITAL | Age: 78
End: 2023-10-26

## 2023-11-17 ENCOUNTER — HOSPITAL ENCOUNTER (OUTPATIENT)
Dept: NURSING | Age: 78
Discharge: HOME | End: 2023-11-17
Payer: MEDICARE

## 2023-11-17 VITALS
HEART RATE: 60 BPM | DIASTOLIC BLOOD PRESSURE: 50 MMHG | RESPIRATION RATE: 18 BRPM | TEMPERATURE: 97.9 F | OXYGEN SATURATION: 98 % | SYSTOLIC BLOOD PRESSURE: 100 MMHG

## 2023-11-17 DIAGNOSIS — M81.0 SENILE OSTEOPOROSIS: Primary | ICD-10-CM

## 2023-11-17 PROCEDURE — 96365 THER/PROPH/DIAG IV INF INIT: CPT

## 2023-11-17 PROCEDURE — 6360000002 HC RX W HCPCS: Performed by: FAMILY MEDICINE

## 2023-11-17 RX ORDER — EPINEPHRINE 1 MG/ML
0.3 INJECTION, SOLUTION INTRAMUSCULAR; SUBCUTANEOUS PRN
OUTPATIENT
Start: 2024-11-10

## 2023-11-17 RX ORDER — ACETAMINOPHEN 325 MG/1
650 TABLET ORAL
OUTPATIENT
Start: 2024-11-10

## 2023-11-17 RX ORDER — SODIUM CHLORIDE 9 MG/ML
5-250 INJECTION, SOLUTION INTRAVENOUS PRN
OUTPATIENT
Start: 2024-11-10

## 2023-11-17 RX ORDER — SODIUM CHLORIDE 0.9 % (FLUSH) 0.9 %
5-40 SYRINGE (ML) INJECTION PRN
OUTPATIENT
Start: 2024-11-10

## 2023-11-17 RX ORDER — ALBUTEROL SULFATE 90 UG/1
4 AEROSOL, METERED RESPIRATORY (INHALATION) PRN
OUTPATIENT
Start: 2024-11-10

## 2023-11-17 RX ORDER — ZOLEDRONIC ACID 5 MG/100ML
5 INJECTION, SOLUTION INTRAVENOUS ONCE
OUTPATIENT
Start: 2024-11-10 | End: 2024-11-10

## 2023-11-17 RX ORDER — ZOLEDRONIC ACID 5 MG/100ML
5 INJECTION, SOLUTION INTRAVENOUS ONCE
Status: COMPLETED | OUTPATIENT
Start: 2023-11-17 | End: 2023-11-17

## 2023-11-17 RX ORDER — ONDANSETRON 2 MG/ML
8 INJECTION INTRAMUSCULAR; INTRAVENOUS
OUTPATIENT
Start: 2024-11-10

## 2023-11-17 RX ORDER — DIPHENHYDRAMINE HYDROCHLORIDE 50 MG/ML
50 INJECTION INTRAMUSCULAR; INTRAVENOUS
OUTPATIENT
Start: 2024-11-10

## 2023-11-17 RX ORDER — SODIUM CHLORIDE 9 MG/ML
INJECTION, SOLUTION INTRAVENOUS CONTINUOUS
OUTPATIENT
Start: 2024-11-10

## 2023-11-17 RX ORDER — HEPARIN 100 UNIT/ML
500 SYRINGE INTRAVENOUS PRN
OUTPATIENT
Start: 2024-11-10

## 2023-11-17 RX ADMIN — ZOLEDRONIC ACID 5 MG: 5 INJECTION, SOLUTION INTRAVENOUS at 13:50

## 2023-11-17 NOTE — PROGRESS NOTES
1335: Patient arrived in wheelchair for Reclast infusion. Patient ok to receive by the creatinine clearance calculator. Patient rights and responsibilities offered to patient. 1350: Reclast infusion started. 1405: Infusion complete, patient tolerated well. No concerns voiced. AVS reviewed with patient, voiced understanding. Patient discharged in wheelchair.              _m___ Safety:       (Environmental)  [place] to environment  Ensure ID band is correct and in place/ allergy band as needed  Assess for fall risk  Initiate fall precautions as applicable (fall band, side rails, etc.)  Call light within reach  Bed in low position/ wheels locked    _m___ Pain:       Assess pain level and characteristics  Administer analgesics as ordered  Assess effectiveness of pain management and report to MD as needed    _m___ Knowledge Deficit:  Assess baseline knowledge  Provide teaching at level of understanding  Provide teaching via preferred learning method  Evaluate teaching effectiveness    _m___ Hemodynamic/Respiratory Status:       (Pre and Post Procedure Monitoring)  Assess/Monitor vital signs and LOC  Assess Baseline SpO2 prior to any sedation  Obtain weight/height  Assess vital signs/ LOC until patient meets discharge criteria  Monitor procedure site and notify MD of any issues

## 2024-03-14 ENCOUNTER — TELEPHONE (OUTPATIENT)
Dept: FAMILY MEDICINE CLINIC | Age: 79
End: 2024-03-14

## 2024-03-14 DIAGNOSIS — N18.32 STAGE 3B CHRONIC KIDNEY DISEASE (HCC): ICD-10-CM

## 2024-03-14 DIAGNOSIS — I10 ESSENTIAL HYPERTENSION: Primary | ICD-10-CM

## 2024-03-14 NOTE — TELEPHONE ENCOUNTER
Pt due for fasting labs prior to next apt on 4/11/2024. Please call to have pt complete this. Thanks!    ASSESSMENT & PLAN   Diagnosis Orders   1. Essential hypertension  CBC with Auto Differential    Comprehensive Metabolic Panel    Lipid Panel    TSH with Reflex    Microalbumin / Creatinine Urine Ratio      2. Stage 3b chronic kidney disease (HCC)  CBC with Auto Differential    Comprehensive Metabolic Panel    Lipid Panel    TSH with Reflex    Microalbumin / Creatinine Urine Ratio        Future Appointments   Date Time Provider Department Center   4/11/2024  3:20 PM Mark Lord,  [facility] [place]   6/10/2024  2:00 PM Dean Kumar MD N [facility] - [place]

## 2024-03-25 DIAGNOSIS — F41.1 GAD (GENERALIZED ANXIETY DISORDER): Primary | Chronic | ICD-10-CM

## 2024-03-25 DIAGNOSIS — M15.9 PRIMARY OSTEOARTHRITIS INVOLVING MULTIPLE JOINTS: ICD-10-CM

## 2024-03-25 DIAGNOSIS — Z79.899 CONTROLLED SUBSTANCE AGREEMENT SIGNED: ICD-10-CM

## 2024-03-25 RX ORDER — TRAMADOL HYDROCHLORIDE 50 MG/1
50 TABLET ORAL 2 TIMES DAILY PRN
Qty: 60 TABLET | Refills: 2 | Status: SHIPPED | OUTPATIENT
Start: 2024-03-25 | End: 2024-06-23

## 2024-03-25 NOTE — TELEPHONE ENCOUNTER
Recent Visits  Date Type Provider Dept   10/11/23 Office Visit Mark Lord, DO [facility]   04/11/23 Office Visit Mark Lord, DO [facility]   10/11/22 Office Visit Mark Lrod, DO [facility]   Showing recent visits within past 540 days with a meds authorizing provider and meeting all other requirements  Future Appointments  Date Type Provider Dept   04/11/24 Appointment Mark Lord, DO [facility]   Showing future appointments within next 150 days with a meds authorizing provider and meeting all other requirements

## 2024-03-25 NOTE — TELEPHONE ENCOUNTER
ASSESSMENT & PLAN   Diagnosis Orders   1. LYLA (generalized anxiety disorder)  sertraline (ZOLOFT) 50 MG tablet      2. Primary osteoarthritis involving multiple joints  traMADol (ULTRAM) 50 MG tablet      3. Controlled substance agreement signed; tramadol  traMADol (ULTRAM) 50 MG tablet          OAARS reviewed and appropriate.     Controlled Substances Monitoring: Periodic Controlled Substance Monitoring: Possible medication side effects, risk of tolerance/dependence & alternative treatments discussed., No signs of potential drug abuse or diversion identified., Assessed functional status (ability to engage in work or other purposeful activities, the pain intensity and its interference with activities of daily living, quality of family life and social activities, and the physical activity), Obtaining appropriate analgesic effect of treatment. (Mark Lord DO)      Future Appointments   Date Time Provider Department Center   4/11/2024  3:20 PM Mark Lord DO [facility] [place]   6/10/2024  2:00 PM Dean Kumar MD N [facility] [place]

## 2024-04-06 ENCOUNTER — NURSE ONLY (OUTPATIENT)
Dept: LAB | Age: 79
End: 2024-04-06

## 2024-04-06 DIAGNOSIS — N18.32 STAGE 3B CHRONIC KIDNEY DISEASE (HCC): ICD-10-CM

## 2024-04-06 DIAGNOSIS — I10 ESSENTIAL HYPERTENSION: ICD-10-CM

## 2024-04-06 LAB
ALBUMIN SERPL BCG-MCNC: 4.4 G/DL (ref 3.5–5.1)
ALP SERPL-CCNC: 62 U/L (ref 38–126)
ALT SERPL W/O P-5'-P-CCNC: 13 U/L (ref 11–66)
ANION GAP SERPL CALC-SCNC: 12 MEQ/L (ref 8–16)
AST SERPL-CCNC: 21 U/L (ref 5–40)
BASOPHILS ABSOLUTE: 0 THOU/MM3 (ref 0–0.1)
BASOPHILS NFR BLD AUTO: 0.9 %
BILIRUB SERPL-MCNC: 1 MG/DL (ref 0.3–1.2)
BUN SERPL-MCNC: 35 MG/DL (ref 7–22)
CALCIUM SERPL-MCNC: 8.7 MG/DL (ref 8.5–10.5)
CHLORIDE SERPL-SCNC: 103 MEQ/L (ref 98–111)
CHOLEST SERPL-MCNC: 166 MG/DL (ref 100–199)
CO2 SERPL-SCNC: 25 MEQ/L (ref 23–33)
CREAT SERPL-MCNC: 1.6 MG/DL (ref 0.4–1.2)
CREAT UR-MCNC: 120.6 MG/DL
DEPRECATED RDW RBC AUTO: 43.8 FL (ref 35–45)
EOSINOPHIL NFR BLD AUTO: 12.3 %
EOSINOPHILS ABSOLUTE: 0.7 THOU/MM3 (ref 0–0.4)
ERYTHROCYTE [DISTWIDTH] IN BLOOD BY AUTOMATED COUNT: 12.2 % (ref 11.5–14.5)
GFR SERPL CREATININE-BSD FRML MDRD: 33 ML/MIN/1.73M2
GLUCOSE SERPL-MCNC: 89 MG/DL (ref 70–108)
HCT VFR BLD AUTO: 34.1 % (ref 37–47)
HDLC SERPL-MCNC: 77 MG/DL
HGB BLD-MCNC: 11.3 GM/DL (ref 12–16)
IMM GRANULOCYTES # BLD AUTO: 0.01 THOU/MM3 (ref 0–0.07)
IMM GRANULOCYTES NFR BLD AUTO: 0.2 %
LDLC SERPL CALC-MCNC: 74 MG/DL
LYMPHOCYTES ABSOLUTE: 1.8 THOU/MM3 (ref 1–4.8)
LYMPHOCYTES NFR BLD AUTO: 33 %
MCH RBC QN AUTO: 32.3 PG (ref 26–33)
MCHC RBC AUTO-ENTMCNC: 33.1 GM/DL (ref 32.2–35.5)
MCV RBC AUTO: 97.4 FL (ref 81–99)
MICROALBUMIN UR-MCNC: 10.43 MG/DL
MICROALBUMIN/CREAT RATIO PNL UR: 86 MG/G (ref 0–30)
MONOCYTES ABSOLUTE: 0.4 THOU/MM3 (ref 0.4–1.3)
MONOCYTES NFR BLD AUTO: 7.6 %
NEUTROPHILS NFR BLD AUTO: 46 %
NRBC BLD AUTO-RTO: 0 /100 WBC
PLATELET # BLD AUTO: 194 THOU/MM3 (ref 130–400)
PMV BLD AUTO: 10.3 FL (ref 9.4–12.4)
POTASSIUM SERPL-SCNC: 4 MEQ/L (ref 3.5–5.2)
PROT SERPL-MCNC: 7.1 G/DL (ref 6.1–8)
RBC # BLD AUTO: 3.5 MILL/MM3 (ref 4.2–5.4)
SEGMENTED NEUTROPHILS ABSOLUTE COUNT: 2.5 THOU/MM3 (ref 1.8–7.7)
SODIUM SERPL-SCNC: 140 MEQ/L (ref 135–145)
TRIGL SERPL-MCNC: 74 MG/DL (ref 0–199)
TSH SERPL DL<=0.005 MIU/L-ACNC: 3.48 UIU/ML (ref 0.4–4.2)
WBC # BLD AUTO: 5.5 THOU/MM3 (ref 4.8–10.8)

## 2024-04-08 ENCOUNTER — TELEPHONE (OUTPATIENT)
Dept: FAMILY MEDICINE CLINIC | Age: 79
End: 2024-04-08

## 2024-04-08 NOTE — TELEPHONE ENCOUNTER
----- Message from Mark Lord, DO sent at 4/7/2024  8:31 AM EDT -----  Please let pt know that labs are stable and appropriate. Let me know if questions, thanks!

## 2024-04-10 PROBLEM — I11.0 HYPERTENSIVE HEART DISEASE WITH CONGESTIVE HEART FAILURE (HCC): Chronic | Status: RESOLVED | Noted: 2019-05-17 | Resolved: 2024-04-10

## 2024-04-10 SDOH — ECONOMIC STABILITY: FOOD INSECURITY: WITHIN THE PAST 12 MONTHS, YOU WORRIED THAT YOUR FOOD WOULD RUN OUT BEFORE YOU GOT MONEY TO BUY MORE.: NEVER TRUE

## 2024-04-10 SDOH — ECONOMIC STABILITY: FOOD INSECURITY: WITHIN THE PAST 12 MONTHS, THE FOOD YOU BOUGHT JUST DIDN'T LAST AND YOU DIDN'T HAVE MONEY TO GET MORE.: NEVER TRUE

## 2024-04-10 SDOH — ECONOMIC STABILITY: HOUSING INSECURITY
IN THE LAST 12 MONTHS, WAS THERE A TIME WHEN YOU DID NOT HAVE A STEADY PLACE TO SLEEP OR SLEPT IN A SHELTER (INCLUDING NOW)?: NO

## 2024-04-10 SDOH — ECONOMIC STABILITY: TRANSPORTATION INSECURITY
IN THE PAST 12 MONTHS, HAS LACK OF TRANSPORTATION KEPT YOU FROM MEETINGS, WORK, OR FROM GETTING THINGS NEEDED FOR DAILY LIVING?: NO

## 2024-04-10 SDOH — ECONOMIC STABILITY: INCOME INSECURITY: HOW HARD IS IT FOR YOU TO PAY FOR THE VERY BASICS LIKE FOOD, HOUSING, MEDICAL CARE, AND HEATING?: NOT HARD AT ALL

## 2024-04-10 SDOH — HEALTH STABILITY: PHYSICAL HEALTH: ON AVERAGE, HOW MANY DAYS PER WEEK DO YOU ENGAGE IN MODERATE TO STRENUOUS EXERCISE (LIKE A BRISK WALK)?: 0 DAYS

## 2024-04-10 SDOH — HEALTH STABILITY: PHYSICAL HEALTH: ON AVERAGE, HOW MANY MINUTES DO YOU ENGAGE IN EXERCISE AT THIS LEVEL?: 0 MIN

## 2024-04-10 ASSESSMENT — LIFESTYLE VARIABLES
HOW MANY STANDARD DRINKS CONTAINING ALCOHOL DO YOU HAVE ON A TYPICAL DAY: 0
HOW MANY STANDARD DRINKS CONTAINING ALCOHOL DO YOU HAVE ON A TYPICAL DAY: PATIENT DOES NOT DRINK
HOW OFTEN DO YOU HAVE A DRINK CONTAINING ALCOHOL: 1
HOW OFTEN DO YOU HAVE A DRINK CONTAINING ALCOHOL: NEVER
HOW OFTEN DO YOU HAVE SIX OR MORE DRINKS ON ONE OCCASION: 1

## 2024-04-10 ASSESSMENT — PATIENT HEALTH QUESTIONNAIRE - PHQ9
1. LITTLE INTEREST OR PLEASURE IN DOING THINGS: NOT AT ALL
SUM OF ALL RESPONSES TO PHQ QUESTIONS 1-9: 0
2. FEELING DOWN, DEPRESSED OR HOPELESS: NOT AT ALL
SUM OF ALL RESPONSES TO PHQ QUESTIONS 1-9: 0
SUM OF ALL RESPONSES TO PHQ9 QUESTIONS 1 & 2: 0

## 2024-04-10 NOTE — PROGRESS NOTES
movement, no respiratory distress or retractions  Cardiovascular: S1 and S2 auscultated w/ RRR. No murmurs, rubs, clicks, or gallops, distal pulses intact.  Abdomen: soft, non-tender, non-distended, bowel sounds physiologic,  no rebound or guarding, no masses or hernias noted. Liver and spleen without enlargement.   Extremities: no cyanosis, clubbing or edema of the lower extremities. +2 PT/DP bilaterally.   Musculoskeletal: No joint swelling or gross deformity   Skin: warm and dry, no rash or erythema  Psych: Affect appropriate. Mood euthymic. Thought process is normal without evidence of depression or psychosis. Good insight and appropriate interaction.  Cognition and memory appear to be intact.      ASSESSMENT & PLAN  1. Chronic heart failure with preserved ejection fraction (HCC)    Doing well, EF improved/stable  con't current meds  On BB, Entresto and pravachol  Daily wts  bumex  F/u Cardio, Jose    2. Coronary artery disease involving native coronary artery of native heart without angina pectoris    As above  Stable  con't tertiary prevention    3. Essential hypertension    Stable  con't Entrestro and coreg    4. Stage 3b chronic kidney disease (HCC)    As above  Labs in 6 months    5. Homocysteinemia    con't folate  F/u integrative med    6. Dyslipidemia    Stable  con't pravachol  Labs UTD    7. Chronic bilateral low back pain without sciatica    Stable  Tramadol works well  Does 60 tabs per 30 days  OAARS is appropriate  Controlled substance agreement current  May call for RF when needed    8. Primary osteoarthritis involving multiple joints    As above     9. Controlled substance agreement signed; tramadol    As above     10. LYLA (generalized anxiety disorder)    Stable  Con't Zoloft    11. Mild intermittent asthma without complication    Stable  Con't Combivent    12. Osteoporosis, post-menopausal    Stable  Due for dexa  3rd reclast dose due NOV 2024    - DEXA BONE DENSITY AXIAL SKELETON;

## 2024-04-11 ENCOUNTER — OFFICE VISIT (OUTPATIENT)
Dept: FAMILY MEDICINE CLINIC | Age: 79
End: 2024-04-11

## 2024-04-11 VITALS
TEMPERATURE: 97.6 F | RESPIRATION RATE: 18 BRPM | DIASTOLIC BLOOD PRESSURE: 74 MMHG | HEART RATE: 69 BPM | HEIGHT: 63 IN | BODY MASS INDEX: 35.12 KG/M2 | OXYGEN SATURATION: 96 % | SYSTOLIC BLOOD PRESSURE: 128 MMHG | WEIGHT: 198.2 LBS

## 2024-04-11 DIAGNOSIS — N18.32 STAGE 3B CHRONIC KIDNEY DISEASE (HCC): ICD-10-CM

## 2024-04-11 DIAGNOSIS — M54.50 CHRONIC BILATERAL LOW BACK PAIN WITHOUT SCIATICA: ICD-10-CM

## 2024-04-11 DIAGNOSIS — J45.20 MILD INTERMITTENT ASTHMA WITHOUT COMPLICATION: ICD-10-CM

## 2024-04-11 DIAGNOSIS — M81.0 OSTEOPOROSIS, POST-MENOPAUSAL: Chronic | ICD-10-CM

## 2024-04-11 DIAGNOSIS — R79.83 HOMOCYSTEINEMIA: Chronic | ICD-10-CM

## 2024-04-11 DIAGNOSIS — I25.10 CORONARY ARTERY DISEASE INVOLVING NATIVE CORONARY ARTERY OF NATIVE HEART WITHOUT ANGINA PECTORIS: ICD-10-CM

## 2024-04-11 DIAGNOSIS — M15.9 PRIMARY OSTEOARTHRITIS INVOLVING MULTIPLE JOINTS: ICD-10-CM

## 2024-04-11 DIAGNOSIS — Z00.00 MEDICARE ANNUAL WELLNESS VISIT, SUBSEQUENT: Primary | ICD-10-CM

## 2024-04-11 DIAGNOSIS — Z79.899 CONTROLLED SUBSTANCE AGREEMENT SIGNED: Chronic | ICD-10-CM

## 2024-04-11 DIAGNOSIS — I50.32 CHRONIC HEART FAILURE WITH PRESERVED EJECTION FRACTION (HCC): Chronic | ICD-10-CM

## 2024-04-11 DIAGNOSIS — E78.5 DYSLIPIDEMIA: Chronic | ICD-10-CM

## 2024-04-11 DIAGNOSIS — I10 ESSENTIAL HYPERTENSION: ICD-10-CM

## 2024-04-11 DIAGNOSIS — F41.1 GAD (GENERALIZED ANXIETY DISORDER): Chronic | ICD-10-CM

## 2024-04-11 DIAGNOSIS — G89.29 CHRONIC BILATERAL LOW BACK PAIN WITHOUT SCIATICA: ICD-10-CM

## 2024-05-17 ENCOUNTER — HOSPITAL ENCOUNTER (OUTPATIENT)
Dept: WOMENS IMAGING | Age: 79
Discharge: HOME | End: 2024-05-17
Attending: FAMILY MEDICINE
Payer: MEDICARE

## 2024-05-17 DIAGNOSIS — M81.0 OSTEOPOROSIS, POST-MENOPAUSAL: Chronic | ICD-10-CM

## 2024-05-17 PROCEDURE — 77080 DXA BONE DENSITY AXIAL: CPT

## 2024-05-20 ENCOUNTER — TELEPHONE (OUTPATIENT)
Dept: FAMILY MEDICINE CLINIC | Age: 79
End: 2024-05-20

## 2024-05-20 NOTE — TELEPHONE ENCOUNTER
----- Message from Mark Lord DO sent at 5/19/2024  9:23 AM EDT -----  Please let pt know that DEXA shows improving Osteoporosis  Will con't with Reclast  Let me know if questions, thanks!

## 2024-06-10 ENCOUNTER — OFFICE VISIT (OUTPATIENT)
Dept: CARDIOLOGY CLINIC | Age: 79
End: 2024-06-10
Payer: MEDICARE

## 2024-06-10 VITALS
BODY MASS INDEX: 35.41 KG/M2 | HEART RATE: 71 BPM | DIASTOLIC BLOOD PRESSURE: 53 MMHG | HEIGHT: 62 IN | SYSTOLIC BLOOD PRESSURE: 108 MMHG | WEIGHT: 192.4 LBS

## 2024-06-10 DIAGNOSIS — I25.10 CORONARY ARTERY DISEASE INVOLVING NATIVE CORONARY ARTERY OF NATIVE HEART WITHOUT ANGINA PECTORIS: ICD-10-CM

## 2024-06-10 DIAGNOSIS — I25.759 ATHEROSCLEROSIS OF NATIVE CORONARY ARTERY OF TRANSPLANTED HEART WITH ANGINA PECTORIS (HCC): ICD-10-CM

## 2024-06-10 DIAGNOSIS — E66.01 SEVERE OBESITY (BMI 35.0-39.9) WITH COMORBIDITY (HCC): ICD-10-CM

## 2024-06-10 DIAGNOSIS — I42.9 CARDIOMYOPATHY, UNSPECIFIED TYPE (HCC): ICD-10-CM

## 2024-06-10 DIAGNOSIS — I10 HYPERTENSION, UNSPECIFIED TYPE: Primary | ICD-10-CM

## 2024-06-10 DIAGNOSIS — I20.9 ANGINA PECTORIS, UNSPECIFIED (HCC): ICD-10-CM

## 2024-06-10 PROBLEM — I25.119 ATHEROSCLEROTIC HEART DISEASE OF NATIVE CORONARY ARTERY WITH UNSPECIFIED ANGINA PECTORIS (HCC): Status: ACTIVE | Noted: 2024-06-10

## 2024-06-10 PROCEDURE — 1036F TOBACCO NON-USER: CPT | Performed by: INTERNAL MEDICINE

## 2024-06-10 PROCEDURE — 1123F ACP DISCUSS/DSCN MKR DOCD: CPT | Performed by: INTERNAL MEDICINE

## 2024-06-10 PROCEDURE — 1090F PRES/ABSN URINE INCON ASSESS: CPT | Performed by: INTERNAL MEDICINE

## 2024-06-10 PROCEDURE — 99214 OFFICE O/P EST MOD 30 MIN: CPT | Performed by: INTERNAL MEDICINE

## 2024-06-10 PROCEDURE — G8427 DOCREV CUR MEDS BY ELIG CLIN: HCPCS | Performed by: INTERNAL MEDICINE

## 2024-06-10 PROCEDURE — 3078F DIAST BP <80 MM HG: CPT | Performed by: INTERNAL MEDICINE

## 2024-06-10 PROCEDURE — 93000 ELECTROCARDIOGRAM COMPLETE: CPT | Performed by: INTERNAL MEDICINE

## 2024-06-10 PROCEDURE — G8399 PT W/DXA RESULTS DOCUMENT: HCPCS | Performed by: INTERNAL MEDICINE

## 2024-06-10 PROCEDURE — G8417 CALC BMI ABV UP PARAM F/U: HCPCS | Performed by: INTERNAL MEDICINE

## 2024-06-10 PROCEDURE — 3074F SYST BP LT 130 MM HG: CPT | Performed by: INTERNAL MEDICINE

## 2024-06-10 NOTE — PROGRESS NOTES
[facility] [place] [facility]  [facility] CARDIOLOGY  [address]  [address]  [place] [address]  Dept: [phone]  Dept Fax: [phone]  Loc: [phone]    Visit Date: 6/10/2024    Ms. Daniel is a 78 y.o. female  who presented for:  Chief Complaint   Patient presents with    New Patient     Former Dr. Garcia pt- EKG completed       HPI:   77 yo F with hx of HTN, HLD, SUNI is here to establish cardiology. Used to see Dr. Garcia in the past. Per notes, she had hx of cardiomyopathy, EF on 2021 resolved to 55%. Under a lot of stress due to sick , considering hospice for him.  Denies any chest pain, sob, palpitations, lightheadedness, dizziness, orthopnea, PND or pedal edema.           Current Outpatient Medications:     sertraline (ZOLOFT) 50 MG tablet, Take 1 tablet by mouth once daily, Disp: 90 tablet, Rfl: 3    traMADol (ULTRAM) 50 MG tablet, Take 1 tablet by mouth 2 times daily as needed for Pain for up to 90 days. Max Daily Amount: 100 mg, Disp: 60 tablet, Rfl: 2    pravastatin (PRAVACHOL) 40 MG tablet, Take 1 tablet by mouth daily, Disp: 90 tablet, Rfl: 3    sacubitril-valsartan (ENTRESTO) 24-26 MG per tablet, Take 1 tablet by mouth 2 times daily, Disp: 90 tablet, Rfl: 3    carvedilol (COREG) 6.25 MG tablet, Take 1 tablet by mouth 2 times daily, Disp: 180 tablet, Rfl: 3    potassium chloride (KLOR-CON M) 10 MEQ extended release tablet, Take 1 tablet by mouth twice daily, Disp: 180 tablet, Rfl: 3    bumetanide (BUMEX) 2 MG tablet, Take 1 tablet by mouth daily, Disp: 90 tablet, Rfl: 3    nitroGLYCERIN (NITROSTAT) 0.4 MG SL tablet, Place 1 tablet under the tongue every 5 minutes as needed for Chest pain, Disp: 25 tablet, Rfl: 3    calcium carbonate 600 MG TABS tablet, Take 1 tablet by mouth in the morning and 1 tablet in the evening. [facility] or [facility] plain Calcium., Disp: , Rfl:     L-methylfolate-B6-B12 (METANX) 3-90.314-2-35 MG CAPS capsule, Take 1 capsule by mouth 2 times

## 2024-06-27 DIAGNOSIS — Z79.899 CONTROLLED SUBSTANCE AGREEMENT SIGNED: ICD-10-CM

## 2024-06-27 DIAGNOSIS — M15.9 PRIMARY OSTEOARTHRITIS INVOLVING MULTIPLE JOINTS: Primary | ICD-10-CM

## 2024-06-27 RX ORDER — TRAMADOL HYDROCHLORIDE 50 MG/1
50 TABLET ORAL 2 TIMES DAILY PRN
Qty: 60 TABLET | Refills: 2 | Status: SHIPPED | OUTPATIENT
Start: 2024-06-27 | End: 2024-09-25

## 2024-06-27 NOTE — TELEPHONE ENCOUNTER
ASSESSMENT & PLAN   Diagnosis Orders   1. Primary osteoarthritis involving multiple joints  traMADol (ULTRAM) 50 MG tablet      2. Controlled substance agreement signed; tramadol  traMADol (ULTRAM) 50 MG tablet          OAARS reviewed and appropriate.     Controlled Substances Monitoring: Periodic Controlled Substance Monitoring: Possible medication side effects, risk of tolerance/dependence & alternative treatments discussed., No signs of potential drug abuse or diversion identified., Assessed functional status (ability to engage in work or other purposeful activities, the pain intensity and its interference with activities of daily living, quality of family life and social activities, and the physical activity), Obtaining appropriate analgesic effect of treatment. (Mark Lord, )      Future Appointments   Date Time Provider Department Center   10/15/2024  2:40 PM Mark Lord DO [facility] [place]   6/9/2025  1:45 PM STR ECHO 1 [facility] Rad/Card   6/19/2025  2:15 PM Dean Kumar MD N [facility] - [place]

## 2024-06-27 NOTE — TELEPHONE ENCOUNTER
Future Appointments   Date Time Provider Department Center   10/15/2024  2:40 PM Mark Lord,  [facility] [place]   6/9/2025  1:45 PM STR ECHO 1 [facility] Rad/Card   6/19/2025  2:15 PM Dean Kumar MD N [facility]

## 2024-07-14 ENCOUNTER — PATIENT MESSAGE (OUTPATIENT)
Dept: FAMILY MEDICINE CLINIC | Age: 79
End: 2024-07-14

## 2024-07-15 RX ORDER — PRAVASTATIN SODIUM 40 MG
40 TABLET ORAL DAILY
Qty: 90 TABLET | Refills: 3 | Status: SHIPPED | OUTPATIENT
Start: 2024-07-15

## 2024-07-15 NOTE — TELEPHONE ENCOUNTER
Recent Visits  Date Type Provider Dept   04/11/24 Office Visit Mark Lord, DO [facility]   10/11/23 Office Visit Mark Lord, DO [facility]   04/11/23 Office Visit Mark Lord, DO [facility]   Showing recent visits within past 540 days with a meds authorizing provider and meeting all other requirements  Future Appointments  Date Type Provider Dept   10/15/24 Appointment Mark Lord, DO [facility]   Showing future appointments within next 150 days with a meds authorizing provider and meeting all other requirements

## 2024-07-15 NOTE — TELEPHONE ENCOUNTER
From: Alana Daniel  To: Dr. Mark Lord  Sent: 7/14/2024 12:45 AM EDT  Subject: Lisseth Daniel - 1945    Hi Dr Lord,  I need a new prescription for my PRAVASTATIN  40 MG TAB TEV. I missed my refill date of 06-22-24.  Terell passed on 06-19-24 so wasn't on top of things.  Thank you for your help.  Lisseth

## 2024-07-26 ENCOUNTER — PATIENT MESSAGE (OUTPATIENT)
Dept: FAMILY MEDICINE CLINIC | Age: 79
End: 2024-07-26

## 2024-07-26 DIAGNOSIS — Z79.899 CONTROLLED SUBSTANCE AGREEMENT SIGNED: ICD-10-CM

## 2024-07-26 DIAGNOSIS — M15.9 PRIMARY OSTEOARTHRITIS INVOLVING MULTIPLE JOINTS: ICD-10-CM

## 2024-07-26 DIAGNOSIS — I50.32 CHRONIC HEART FAILURE WITH PRESERVED EJECTION FRACTION (HCC): Primary | ICD-10-CM

## 2024-07-26 RX ORDER — SACUBITRIL AND VALSARTAN 24; 26 MG/1; MG/1
1 TABLET, FILM COATED ORAL 2 TIMES DAILY
Qty: 90 TABLET | Refills: 3 | Status: CANCELLED | OUTPATIENT
Start: 2024-07-26

## 2024-07-26 RX ORDER — SACUBITRIL AND VALSARTAN 24; 26 MG/1; MG/1
1 TABLET, FILM COATED ORAL 2 TIMES DAILY
Qty: 180 TABLET | Refills: 3 | Status: SHIPPED | OUTPATIENT
Start: 2024-07-26

## 2024-07-26 RX ORDER — CARVEDILOL 6.25 MG/1
6.25 TABLET ORAL 2 TIMES DAILY
Qty: 180 TABLET | Refills: 3 | Status: CANCELLED | OUTPATIENT
Start: 2024-07-26

## 2024-07-26 RX ORDER — CARVEDILOL 6.25 MG/1
6.25 TABLET ORAL 2 TIMES DAILY
Qty: 180 TABLET | Refills: 3 | Status: SHIPPED | OUTPATIENT
Start: 2024-07-26

## 2024-07-26 RX ORDER — TRAMADOL HYDROCHLORIDE 50 MG/1
50 TABLET ORAL 2 TIMES DAILY PRN
Qty: 60 TABLET | Refills: 2 | Status: SHIPPED | OUTPATIENT
Start: 2024-07-26 | End: 2024-10-24

## 2024-07-26 NOTE — TELEPHONE ENCOUNTER
From: Alana Daniel  To: Dr. Mark Lord  Sent: 7/26/2024 1:45 AM EDT  Subject: Lisseth Daniel - 1945    Good Morning Dr Lord,  Sorry to bother you but I need 3 prescriptions refilled:    TRAMADOL - 50 MG TABLETS - take 1 tablet by mouth four times daily as needed for pain    CARVEDILOL - 6.25 MG - TAB Zydus - take 1 tablet by  mouth twice daily    ENTRESTO - 24-26 MG - TAB NOV - take 1 tablet by  mouth twice daily    Thank you so much for your assistance with this  request.   I am doing ok since Terell's passing. Some days are tough since we were together for 44 years and did everything together. At least he is no longer in pain and not fighting to breathe.  Thanks again for all you do.  Lisseth

## 2024-09-12 ENCOUNTER — TELEPHONE (OUTPATIENT)
Dept: FAMILY MEDICINE CLINIC | Age: 79
End: 2024-09-12

## 2024-09-12 DIAGNOSIS — N18.32 STAGE 3B CHRONIC KIDNEY DISEASE (HCC): Primary | ICD-10-CM

## 2024-10-02 ENCOUNTER — LAB (OUTPATIENT)
Dept: LAB | Age: 79
End: 2024-10-02

## 2024-10-02 DIAGNOSIS — N18.32 STAGE 3B CHRONIC KIDNEY DISEASE (HCC): ICD-10-CM

## 2024-10-02 LAB
ANION GAP SERPL CALC-SCNC: 14 MEQ/L (ref 8–16)
BASOPHILS ABSOLUTE: 0.1 THOU/MM3 (ref 0–0.1)
BASOPHILS NFR BLD AUTO: 1 %
BUN SERPL-MCNC: 32 MG/DL (ref 7–22)
CALCIUM SERPL-MCNC: 8.7 MG/DL (ref 8.5–10.5)
CHLORIDE SERPL-SCNC: 104 MEQ/L (ref 98–111)
CO2 SERPL-SCNC: 25 MEQ/L (ref 23–33)
CREAT SERPL-MCNC: 1.6 MG/DL (ref 0.4–1.2)
DEPRECATED RDW RBC AUTO: 44.3 FL (ref 35–45)
EOSINOPHIL NFR BLD AUTO: 12.5 %
EOSINOPHILS ABSOLUTE: 0.6 THOU/MM3 (ref 0–0.4)
ERYTHROCYTE [DISTWIDTH] IN BLOOD BY AUTOMATED COUNT: 12.8 % (ref 11.5–14.5)
GFR SERPL CREATININE-BSD FRML MDRD: 33 ML/MIN/1.73M2
GLUCOSE SERPL-MCNC: 90 MG/DL (ref 70–108)
HCT VFR BLD AUTO: 35.6 % (ref 37–47)
HGB BLD-MCNC: 11.6 GM/DL (ref 12–16)
IMM GRANULOCYTES # BLD AUTO: 0.01 THOU/MM3 (ref 0–0.07)
IMM GRANULOCYTES NFR BLD AUTO: 0.2 %
LYMPHOCYTES ABSOLUTE: 1.7 THOU/MM3 (ref 1–4.8)
LYMPHOCYTES NFR BLD AUTO: 33 %
MCH RBC QN AUTO: 30.6 PG (ref 26–33)
MCHC RBC AUTO-ENTMCNC: 32.6 GM/DL (ref 32.2–35.5)
MCV RBC AUTO: 93.9 FL (ref 81–99)
MONOCYTES ABSOLUTE: 0.3 THOU/MM3 (ref 0.4–1.3)
MONOCYTES NFR BLD AUTO: 6.8 %
NEUTROPHILS ABSOLUTE: 2.3 THOU/MM3 (ref 1.8–7.7)
NEUTROPHILS NFR BLD AUTO: 46.5 %
NRBC BLD AUTO-RTO: 0 /100 WBC
PLATELET # BLD AUTO: 198 THOU/MM3 (ref 130–400)
PMV BLD AUTO: 10 FL (ref 9.4–12.4)
POTASSIUM SERPL-SCNC: 3.9 MEQ/L (ref 3.5–5.2)
RBC # BLD AUTO: 3.79 MILL/MM3 (ref 4.2–5.4)
SODIUM SERPL-SCNC: 143 MEQ/L (ref 135–145)
WBC # BLD AUTO: 5 THOU/MM3 (ref 4.8–10.8)

## 2024-10-03 ENCOUNTER — TELEPHONE (OUTPATIENT)
Dept: FAMILY MEDICINE CLINIC | Age: 79
End: 2024-10-03

## 2024-10-03 NOTE — TELEPHONE ENCOUNTER
----- Message from Dr. Mark Lord, DO sent at 10/2/2024  9:07 PM EDT -----  Please let pt know that labs are stable and appropriate. Let me know if questions, thanks!

## 2024-10-14 NOTE — PROGRESS NOTES
Chief Complaint   Patient presents with    Follow-up     Chronic issues noted below     History obtained from the patient.    SUBJECTIVE:  Alana Daniel is a 79 y.o. female that presents today for     -CHF PRIOR VISIT:   Patient admitted to [facility] from 5/17 to 5/22 for new onset HF.   D/c summary:   None at time of my evalation    Last cardio PN:  Plan:   Patient with non ischemic chf   continue medical rx    will need life vest placement    ambulate    will see at the office few weeks       Since discharge has done well. No SOB, orthopnea or pND. Has life vest. Has not done off. Tolerating meds. Has f/u with cardioJose, next wk. Doing daily wts, stable.       UPDATE PRIOR VISIT:   Overall doing better  Following with Jose  No CP/SOb  Pt told EF better, records pending, and off life vest.   Still having occ palp    UPDATE TODAY:   Repeat EF 55+%  No CP/sOB  On Entresto yet as well as BB and statin  Seeing new cardiologist, her previous cardiologist, Jose, retired  Has f/u scheduled  wts steady  Feeling well      -HTN/CKD3b:    HPI:     Taking meds as prescribed ?: yes  Tolerating well ?: yes  Side Effects ?: denies  BP at home ?: <140/90  Working on TLCS ?: yes  Chest Pain/SOB/Palpitations? denies    BP Readings from Last 3 Encounters:   10/15/24 130/76   06/10/24 (!) 108/53   04/11/24 128/74       -Homocystine PRIOr VISIT: noted on labs a few years ago. Not treated as far as she knows. Las labs 2016.     UPDATE TODAY:   on folate   Labs UTD      -HLD:    HPI:    Taking meds as prescribed ?: yes  Tolerating well ?: yes  Side Effects ?: denies  Muscle Pain?: denies  Working on TLCS ?: yes      -Chronic pain: has back issues and OA: uses tramadol. 60 tabs per 30 days. Was getting from Gavin, old PCP. Had asked me to take over. OAARS reviewed and appropriate. UTD on refills.        -LYLA: on zoloft, works well. No sI/HI.         -Asthma: On prn combivent. Works well. Uses once per month. PFT 2017 with

## 2024-10-15 ENCOUNTER — OFFICE VISIT (OUTPATIENT)
Dept: FAMILY MEDICINE CLINIC | Age: 79
End: 2024-10-15

## 2024-10-15 VITALS
BODY MASS INDEX: 35.41 KG/M2 | DIASTOLIC BLOOD PRESSURE: 76 MMHG | OXYGEN SATURATION: 98 % | WEIGHT: 192.4 LBS | TEMPERATURE: 97.7 F | HEART RATE: 70 BPM | SYSTOLIC BLOOD PRESSURE: 130 MMHG | RESPIRATION RATE: 18 BRPM | HEIGHT: 62 IN

## 2024-10-15 DIAGNOSIS — M54.50 CHRONIC BILATERAL LOW BACK PAIN WITHOUT SCIATICA: ICD-10-CM

## 2024-10-15 DIAGNOSIS — M81.0 OSTEOPOROSIS, POST-MENOPAUSAL: Chronic | ICD-10-CM

## 2024-10-15 DIAGNOSIS — Z23 NEED FOR INFLUENZA VACCINATION: ICD-10-CM

## 2024-10-15 DIAGNOSIS — J45.20 MILD INTERMITTENT ASTHMA WITHOUT COMPLICATION: ICD-10-CM

## 2024-10-15 DIAGNOSIS — R79.83 HOMOCYSTEINEMIA: Chronic | ICD-10-CM

## 2024-10-15 DIAGNOSIS — G89.29 CHRONIC BILATERAL LOW BACK PAIN WITHOUT SCIATICA: ICD-10-CM

## 2024-10-15 DIAGNOSIS — I50.32 CHRONIC HEART FAILURE WITH PRESERVED EJECTION FRACTION (HCC): Primary | ICD-10-CM

## 2024-10-15 DIAGNOSIS — Z79.899 CONTROLLED SUBSTANCE AGREEMENT SIGNED: Chronic | ICD-10-CM

## 2024-10-15 DIAGNOSIS — E78.5 DYSLIPIDEMIA: Chronic | ICD-10-CM

## 2024-10-15 DIAGNOSIS — F41.1 GAD (GENERALIZED ANXIETY DISORDER): Chronic | ICD-10-CM

## 2024-10-15 DIAGNOSIS — I10 ESSENTIAL HYPERTENSION: ICD-10-CM

## 2024-10-15 DIAGNOSIS — N18.32 STAGE 3B CHRONIC KIDNEY DISEASE (HCC): ICD-10-CM

## 2024-10-15 DIAGNOSIS — M15.0 PRIMARY OSTEOARTHRITIS INVOLVING MULTIPLE JOINTS: ICD-10-CM

## 2024-10-15 DIAGNOSIS — I25.10 CORONARY ARTERY DISEASE INVOLVING NATIVE CORONARY ARTERY OF NATIVE HEART WITHOUT ANGINA PECTORIS: ICD-10-CM

## 2024-10-15 NOTE — PROGRESS NOTES
Vaccine Information Sheet, \"Influenza - Inactivated\"  given to Alana Daniel, or parent/legal guardian of  Alana Daniel and verbalized understanding.    Patient responses:    Have you ever had a reaction to a flu vaccine? No  Do you have an allergy to eggs, neomycin or polymixin?  No  Do you have an allergy to Thimerosal, contact lens solution, or Merthiolate? No  Have you ever had Guillian Grady Syndrome?  No  Do you have any current illness?  No  Do you have a temperature above 100 degrees? No  Are you pregnant? No  If pregnant, permission obtained from physician? No  Do you have an active neurological disorder? No      Flu vaccine given per order. Please see immunization tab.    Immunization(s) given during visit:    Immunizations Administered       Name Date Dose Route    Influenza, FLUAD, (age 65 y+), IM, Trivalent PF, 0.5mL 10/15/2024 0.5 mL Intramuscular    Site: Deltoid- Left    Lot: [id number]    NDC: 10765-492-92            Most recent Vaccine Information Sheet dated 8.6.2021 given to pt

## 2024-11-08 ENCOUNTER — PATIENT MESSAGE (OUTPATIENT)
Dept: FAMILY MEDICINE CLINIC | Age: 79
End: 2024-11-08

## 2024-11-08 DIAGNOSIS — Z79.899 CONTROLLED SUBSTANCE AGREEMENT SIGNED: ICD-10-CM

## 2024-11-08 DIAGNOSIS — M15.0 PRIMARY OSTEOARTHRITIS INVOLVING MULTIPLE JOINTS: Primary | ICD-10-CM

## 2024-11-08 RX ORDER — TRAMADOL HYDROCHLORIDE 50 MG/1
50 TABLET ORAL 2 TIMES DAILY PRN
Qty: 60 TABLET | Refills: 2 | Status: SHIPPED | OUTPATIENT
Start: 2024-11-08 | End: 2025-02-06

## 2024-11-08 NOTE — TELEPHONE ENCOUNTER
Recent Visits  Date Type Provider Dept   10/15/24 Office Visit Mark Lord, DO [facility]   04/11/24 Office Visit Mark Lord, DO [facility]   10/11/23 Office Visit Mark Lord, DO [facility]   Showing recent visits within past 540 days with a meds authorizing provider and meeting all other requirements  Future Appointments  No visits were found meeting these conditions.  Showing future appointments within next 150 days with a meds authorizing provider and meeting all other requirements

## 2024-11-08 NOTE — TELEPHONE ENCOUNTER
ASSESSMENT & PLAN   Diagnosis Orders   1. Primary osteoarthritis involving multiple joints  traMADol (ULTRAM) 50 MG tablet      2. Controlled substance agreement signed; tramadol  traMADol (ULTRAM) 50 MG tablet          OAARS reviewed and appropriate.     Controlled Substances Monitoring: Periodic Controlled Substance Monitoring: Possible medication side effects, risk of tolerance/dependence & alternative treatments discussed., No signs of potential drug abuse or diversion identified., Assessed functional status (ability to engage in work or other purposeful activities, the pain intensity and its interference with activities of daily living, quality of family life and social activities, and the physical activity), Obtaining appropriate analgesic effect of treatment. (Mark Lord, )      Future Appointments   Date Time Provider Department Center   4/15/2025  2:20 PM Mark Lord DO [facility] DE[facility]   6/9/2025  1:45 PM STR ECHO 1 [facility] STR Rad/Card   6/19/2025  2:15 PM Dean Kumar MD N [facility]

## 2025-01-15 ENCOUNTER — PATIENT MESSAGE (OUTPATIENT)
Dept: FAMILY MEDICINE CLINIC | Age: 80
End: 2025-01-15

## 2025-01-15 DIAGNOSIS — K52.9 GASTROENTERITIS, ACUTE: Primary | ICD-10-CM

## 2025-01-15 DIAGNOSIS — M15.0 PRIMARY OSTEOARTHRITIS INVOLVING MULTIPLE JOINTS: ICD-10-CM

## 2025-01-15 DIAGNOSIS — Z79.899 CONTROLLED SUBSTANCE AGREEMENT SIGNED: ICD-10-CM

## 2025-01-15 RX ORDER — ONDANSETRON 4 MG/1
4 TABLET, FILM COATED ORAL 3 TIMES DAILY PRN
Qty: 30 TABLET | Refills: 0 | Status: SHIPPED | OUTPATIENT
Start: 2025-01-15 | End: 2025-01-25

## 2025-01-15 RX ORDER — TRAMADOL HYDROCHLORIDE 50 MG/1
50 TABLET ORAL 2 TIMES DAILY PRN
Qty: 60 TABLET | Refills: 2 | Status: SHIPPED | OUTPATIENT
Start: 2025-01-15 | End: 2025-01-16 | Stop reason: DRUGHIGH

## 2025-01-15 NOTE — TELEPHONE ENCOUNTER
ASSESSMENT & PLAN   Diagnosis Orders   1. Gastroenteritis, acute  ondansetron (ZOFRAN) 4 MG tablet      2. Primary osteoarthritis involving multiple joints  traMADol (ULTRAM) 50 MG tablet      3. Controlled substance agreement signed; tramadol  traMADol (ULTRAM) 50 MG tablet          OAARS reviewed and appropriate.     Controlled Substances Monitoring: Periodic Controlled Substance Monitoring: Possible medication side effects, risk of tolerance/dependence & alternative treatments discussed., No signs of potential drug abuse or diversion identified., Assessed functional status (ability to engage in work or other purposeful activities, the pain intensity and its interference with activities of daily living, quality of family life and social activities, and the physical activity), Obtaining appropriate analgesic effect of treatment. (Mark Lord DO)      Future Appointments   Date Time Provider Department Center   4/15/2025  2:20 PM Mark Lord DO [facility] DEP   6/9/2025  1:45 PM STR ECHO 1 [facility] STR Rad/Card   6/19/2025  2:15 PM Dean Kumar MD N [facility]

## 2025-01-16 ENCOUNTER — OFFICE VISIT (OUTPATIENT)
Dept: FAMILY MEDICINE CLINIC | Age: 80
End: 2025-01-16

## 2025-01-16 ENCOUNTER — PATIENT MESSAGE (OUTPATIENT)
Dept: FAMILY MEDICINE CLINIC | Age: 80
End: 2025-01-16

## 2025-01-16 VITALS
OXYGEN SATURATION: 98 % | BODY MASS INDEX: 35.11 KG/M2 | SYSTOLIC BLOOD PRESSURE: 104 MMHG | HEART RATE: 90 BPM | WEIGHT: 190.8 LBS | TEMPERATURE: 97.7 F | RESPIRATION RATE: 14 BRPM | DIASTOLIC BLOOD PRESSURE: 68 MMHG | HEIGHT: 62 IN

## 2025-01-16 DIAGNOSIS — F41.8 DEPRESSION WITH ANXIETY: ICD-10-CM

## 2025-01-16 DIAGNOSIS — K52.9 GASTROENTERITIS, ACUTE: Primary | ICD-10-CM

## 2025-01-16 RX ORDER — SERTRALINE HYDROCHLORIDE 100 MG/1
100 TABLET, FILM COATED ORAL DAILY
Qty: 30 TABLET | Refills: 3 | Status: SHIPPED | OUTPATIENT
Start: 2025-01-16

## 2025-01-16 SDOH — ECONOMIC STABILITY: FOOD INSECURITY: WITHIN THE PAST 12 MONTHS, THE FOOD YOU BOUGHT JUST DIDN'T LAST AND YOU DIDN'T HAVE MONEY TO GET MORE.: NEVER TRUE

## 2025-01-16 SDOH — ECONOMIC STABILITY: FOOD INSECURITY: WITHIN THE PAST 12 MONTHS, YOU WORRIED THAT YOUR FOOD WOULD RUN OUT BEFORE YOU GOT MONEY TO BUY MORE.: NEVER TRUE

## 2025-01-16 ASSESSMENT — PATIENT HEALTH QUESTIONNAIRE - PHQ9
9. THOUGHTS THAT YOU WOULD BE BETTER OFF DEAD, OR OF HURTING YOURSELF: NOT AT ALL
3. TROUBLE FALLING OR STAYING ASLEEP: MORE THAN HALF THE DAYS
SUM OF ALL RESPONSES TO PHQ QUESTIONS 1-9: 10
1. LITTLE INTEREST OR PLEASURE IN DOING THINGS: NOT AT ALL
SUM OF ALL RESPONSES TO PHQ QUESTIONS 1-9: 10
7. TROUBLE CONCENTRATING ON THINGS, SUCH AS READING THE NEWSPAPER OR WATCHING TELEVISION: NOT AT ALL
SUM OF ALL RESPONSES TO PHQ9 QUESTIONS 1 & 2: 3
8. MOVING OR SPEAKING SO SLOWLY THAT OTHER PEOPLE COULD HAVE NOTICED. OR THE OPPOSITE, BEING SO FIGETY OR RESTLESS THAT YOU HAVE BEEN MOVING AROUND A LOT MORE THAN USUAL: NOT AT ALL
SUM OF ALL RESPONSES TO PHQ QUESTIONS 1-9: 10
4. FEELING TIRED OR HAVING LITTLE ENERGY: MORE THAN HALF THE DAYS
SUM OF ALL RESPONSES TO PHQ QUESTIONS 1-9: 10
10. IF YOU CHECKED OFF ANY PROBLEMS, HOW DIFFICULT HAVE THESE PROBLEMS MADE IT FOR YOU TO DO YOUR WORK, TAKE CARE OF THINGS AT HOME, OR GET ALONG WITH OTHER PEOPLE: SOMEWHAT DIFFICULT
5. POOR APPETITE OR OVEREATING: SEVERAL DAYS
6. FEELING BAD ABOUT YOURSELF - OR THAT YOU ARE A FAILURE OR HAVE LET YOURSELF OR YOUR FAMILY DOWN: MORE THAN HALF THE DAYS
2. FEELING DOWN, DEPRESSED OR HOPELESS: NEARLY EVERY DAY

## 2025-01-16 NOTE — TELEPHONE ENCOUNTER
Scheduled for today    Future Appointments   Date Time Provider Department Center   1/16/2025  4:20 PM Mark Lord, DO [facility] DEP   4/15/2025  2:20 PM Mark Lord, DO [facility] DEP   6/9/2025  1:45 PM STR ECHO 1 [facility] STR Rad/Card   6/19/2025  2:15 PM Dean Kumar MD N [facility]

## 2025-01-16 NOTE — PROGRESS NOTES
Chief Complaint   Patient presents with    Nausea & Vomiting    Diarrhea    Depression     History obtained from the patient.    SUBJECTIVE:  Alana Daniel is a 79 y.o. female that presents today for     -GI issues:   started with N/V/D  Worse the , some better yesterday  Doing a bit better today  Still pretty weak  Not eating much  Keeping up with fluids  No emesis since yesterday morning  Diarrhea is very light  No blood in stool  No abd pain  No fevers      +Depression screening:  + depression screen    in the last 6 months  Not coping as well recently  On zoloft 50mg for anxiety  No SI/HI      Age/Gender Health Maintenance    Lipid -   Lab Results   Component Value Date    CHOL 166 2024    CHOL 179 2023    CHOL 181 10/27/2021     Lab Results   Component Value Date    TRIG 74 2024    TRIG 88 2023    TRIG 103 10/27/2021     Lab Results   Component Value Date    HDL 77 2024    HDL 78 2023    HDL 77 10/27/2021     Lab Results   Component Value Date    LDL 74 2024    LDL 83 2023    LDL 83 10/27/2021       TSH -   Lab Results   Component Value Date    TSH 3.480 2024       DM Screen -   Lab Results   Component Value Date/Time    GLUCOSE 90 10/02/2024 02:17 PM    GLUCOSE 95 10/08/2019 12:59 PM     Lab Results   Component Value Date/Time    LABA1C 5.0 2023 02:04 PM    LABA1C 5.2 10/01/2022 11:06 AM    LABA1C 6.0 10/27/2021 12:49 PM    LABA1C 5.4 01/10/2020 01:21 PM    LABA1C 5.2 2016 01:03 PM    LABA1C 5.4 2014 02:00 PM       Colon Cancer Screening - neg with cecal ulcer AUG 2010, repeat 5 years per  d/t fam hx; pt plans to call Dr. Ruvalcaba's offce 2019/2020/OCT 2020/2021/OCT 2021/2022/OCT 2022/2023/OCT 2023/2024/OCT 2024  Lung Cancer Screening - never smoker    Tetanus - UTD 2015  Influenza Vaccine - UTD OCT 2024  Pneumonia Vaccine - UTD PPV 23 and PCV 13  Zostavax - Zostavax OCT 2014

## 2025-01-28 ENCOUNTER — PATIENT MESSAGE (OUTPATIENT)
Dept: FAMILY MEDICINE CLINIC | Age: 80
End: 2025-01-28

## 2025-01-30 NOTE — TELEPHONE ENCOUNTER
Staff  Please call Lisseth and let her know I have paperwork pulled for her late , Harsh \"Terell\" Fredy,  [birth year], and have placed it at the  for her to .     Thanks!

## 2025-02-04 ENCOUNTER — PATIENT MESSAGE (OUTPATIENT)
Dept: FAMILY MEDICINE CLINIC | Age: 80
End: 2025-02-04

## 2025-02-04 DIAGNOSIS — Z79.899 CONTROLLED SUBSTANCE AGREEMENT SIGNED: ICD-10-CM

## 2025-02-04 DIAGNOSIS — M15.0 PRIMARY OSTEOARTHRITIS INVOLVING MULTIPLE JOINTS: Primary | ICD-10-CM

## 2025-02-04 RX ORDER — TRAMADOL HYDROCHLORIDE 50 MG/1
50 TABLET ORAL 2 TIMES DAILY PRN
Qty: 60 TABLET | Refills: 2 | Status: SHIPPED | OUTPATIENT
Start: 2025-02-04 | End: 2025-05-05

## 2025-02-04 NOTE — TELEPHONE ENCOUNTER
ASSESSMENT & PLAN   Diagnosis Orders   1. Primary osteoarthritis involving multiple joints  traMADol (ULTRAM) 50 MG tablet      2. Controlled substance agreement signed; tramadol  traMADol (ULTRAM) 50 MG tablet          OAARS reviewed and appropriate.     Controlled Substances Monitoring: Periodic Controlled Substance Monitoring: Possible medication side effects, risk of tolerance/dependence & alternative treatments discussed., No signs of potential drug abuse or diversion identified., Assessed functional status (ability to engage in work or other purposeful activities, the pain intensity and its interference with activities of daily living, quality of family life and social activities, and the physical activity), Obtaining appropriate analgesic effect of treatment. (Mark Lord, )      Future Appointments   Date Time Provider Department Center   4/15/2025  2:20 PM Mark Lord DO [facility] DEP   6/9/2025  1:45 PM STR ECHO 1 [facility] STR Rad/Card   6/19/2025  2:15 PM Dean Kumar MD N [facility]

## 2025-03-24 DIAGNOSIS — I50.1 HEART FAILURE, LEFT, WITH LVEF <=30% (HCC): ICD-10-CM

## 2025-03-24 DIAGNOSIS — I11.0 HYPERTENSIVE HEART DISEASE WITH CHRONIC SYSTOLIC CONGESTIVE HEART FAILURE (HCC): ICD-10-CM

## 2025-03-24 DIAGNOSIS — I50.22 HYPERTENSIVE HEART DISEASE WITH CHRONIC SYSTOLIC CONGESTIVE HEART FAILURE (HCC): ICD-10-CM

## 2025-03-24 RX ORDER — NITROGLYCERIN 0.4 MG/1
0.4 TABLET SUBLINGUAL EVERY 5 MIN PRN
Qty: 25 TABLET | Refills: 3 | Status: SHIPPED | OUTPATIENT
Start: 2025-03-24

## 2025-03-24 RX ORDER — BUMETANIDE 2 MG/1
2 TABLET ORAL DAILY
Qty: 90 TABLET | Refills: 0 | Status: SHIPPED | OUTPATIENT
Start: 2025-03-24

## 2025-03-27 ENCOUNTER — TELEPHONE (OUTPATIENT)
Dept: FAMILY MEDICINE CLINIC | Age: 80
End: 2025-03-27

## 2025-03-27 DIAGNOSIS — I10 ESSENTIAL HYPERTENSION: Primary | ICD-10-CM

## 2025-03-27 NOTE — TELEPHONE ENCOUNTER
Pt due for fasting labs prior to next apt on 4/15/2025. Please call to have pt complete this. Thanks!    ASSESSMENT & PLAN   Diagnosis Orders   1. Essential hypertension  CBC with Auto Differential    Comprehensive Metabolic Panel    Lipid Panel    TSH reflex to FT4    Albumin/Creatinine Ratio, Urine        Future Appointments   Date Time Provider Department Center   4/9/2025 10:00 AM Marin Glass MD [facility] DEP   4/15/2025  2:20 PM Mark Lord, DO [facility] DEP   6/9/2025  1:45 PM STR ECHO 1 [facility] STR Rad/Card   6/19/2025  2:15 PM Dean Kumar MD N [facility]

## 2025-03-27 NOTE — TELEPHONE ENCOUNTER
----- Message from Dr. Mark Lord, DO sent at 10/15/2024  3:09 PM EDT -----  Due for labs before next visit  May be due for Prolia soon as well, see if scheduled.

## 2025-03-27 NOTE — TELEPHONE ENCOUNTER
Pt informed of lab to be completed before 4.15.25 . Pt voiced understanding with no further questions at this time.     Lab slip mailed to pt

## 2025-04-08 PROBLEM — A08.11 NOROVIRUS: Status: ACTIVE | Noted: 2025-01-14

## 2025-04-08 NOTE — PROGRESS NOTES
patient today   3.   Greater than 49 minutes time was spent with the patient face to face on this visit; of which >50% was for counseling and coordination of care, as well as the time spent before and after the visit reviewing the chart, documenting the encounter, reviewing labs,reports, NIH listed studies, making phone calls, etc.      Patients food and drinks were reviewed with the patient,   - They will bring a food drink symptom log to future visits for inclusion in their record    - 75 better food list reviewed & given to patient along with the omega 6 food list to avoid      - Gluten in corn and oats abstracts sheet reviewed and given to the patient today    - 23 Foods containing Latex-like proteins was reviewed and copy to be taken if desired     - Nutrient Supplements list provided and copyto be taken if desired    - [employer]Pounce web site offered to patient to review at their convenience by staff with login information    Note:  I have discussed with the patient that with all nutraceuticals, there is often mixed data and emerging research which needs to be monitored; as well as an array of NIH fact sheets on nutrients and supplements, available at www.nih,issue plus [employer].Pounce plus www.[employer],org.     If I have recommended cinnamon at the request of this patient to assist them in control of their blood sugar, triglyceride, and/or weight issues.  I discussed that the patient's clinical use of cinnamon bark, calcium, magnesium, Vitamin D, and pharmaceutical grade [employer] omega 3 oil or triple-strength fish oil, and B-50/B-100 time-released B-complex by Leo will be for a time-limited trial to determine their individual effectiveness and safety in this patient.  I also referred the patient to the NMCD: Nutrition, Metabolism, and Cardiovascular Diseases (https://www.nmcd-journal.com/) and concerns about long-term use and hepatotoxicity of cinnamon and other nutrients.  I suggested they

## 2025-04-09 ENCOUNTER — RESULTS FOLLOW-UP (OUTPATIENT)
Dept: FAMILY MEDICINE CLINIC | Age: 80
End: 2025-04-09

## 2025-04-09 ENCOUNTER — LAB (OUTPATIENT)
Dept: LAB | Age: 80
End: 2025-04-09

## 2025-04-09 ENCOUNTER — OFFICE VISIT (OUTPATIENT)
Dept: FAMILY MEDICINE CLINIC | Age: 80
End: 2025-04-09
Payer: MEDICARE

## 2025-04-09 VITALS
HEIGHT: 62 IN | DIASTOLIC BLOOD PRESSURE: 60 MMHG | WEIGHT: 188 LBS | HEART RATE: 55 BPM | SYSTOLIC BLOOD PRESSURE: 124 MMHG | OXYGEN SATURATION: 96 % | BODY MASS INDEX: 34.6 KG/M2 | RESPIRATION RATE: 12 BRPM | TEMPERATURE: 97.9 F

## 2025-04-09 DIAGNOSIS — I10 ESSENTIAL HYPERTENSION: ICD-10-CM

## 2025-04-09 DIAGNOSIS — I50.1 HEART FAILURE, LEFT, WITH LVEF <=30% (HCC): Primary | ICD-10-CM

## 2025-04-09 DIAGNOSIS — N18.32 STAGE 3B CHRONIC KIDNEY DISEASE (HCC): ICD-10-CM

## 2025-04-09 DIAGNOSIS — N18.1 STAGE 1 CHRONIC KIDNEY DISEASE: ICD-10-CM

## 2025-04-09 DIAGNOSIS — D64.9 NORMOCYTIC ANEMIA: Primary | ICD-10-CM

## 2025-04-09 LAB
ALBUMIN SERPL BCG-MCNC: 4 G/DL (ref 3.4–4.9)
ALP SERPL-CCNC: 52 U/L (ref 38–126)
ALT SERPL W/O P-5'-P-CCNC: 14 U/L (ref 10–35)
ANION GAP SERPL CALC-SCNC: 12 MEQ/L (ref 8–16)
AST SERPL-CCNC: 24 U/L (ref 10–35)
BASOPHILS ABSOLUTE: 0 THOU/MM3 (ref 0–0.1)
BASOPHILS NFR BLD AUTO: 0.8 %
BILIRUB SERPL-MCNC: 0.7 MG/DL (ref 0.3–1.2)
BUN SERPL-MCNC: 36 MG/DL (ref 8–23)
CALCIUM SERPL-MCNC: 9.4 MG/DL (ref 8.8–10.2)
CHLORIDE SERPL-SCNC: 110 MEQ/L (ref 98–111)
CHOLEST SERPL-MCNC: 173 MG/DL (ref 100–199)
CO2 SERPL-SCNC: 21 MEQ/L (ref 22–29)
CREAT SERPL-MCNC: 1.5 MG/DL (ref 0.5–0.9)
CREAT UR-MCNC: 118 MG/DL
DEPRECATED RDW RBC AUTO: 46 FL (ref 35–45)
EOSINOPHIL NFR BLD AUTO: 4.1 %
EOSINOPHILS ABSOLUTE: 0.2 THOU/MM3 (ref 0–0.4)
ERYTHROCYTE [DISTWIDTH] IN BLOOD BY AUTOMATED COUNT: 13.2 % (ref 11.5–14.5)
GFR SERPL CREATININE-BSD FRML MDRD: 35 ML/MIN/1.73M2
GLUCOSE SERPL-MCNC: 95 MG/DL (ref 74–109)
HCT VFR BLD AUTO: 33.4 % (ref 37–47)
HDLC SERPL-MCNC: 72 MG/DL
HGB BLD-MCNC: 10.8 GM/DL (ref 12–16)
IMM GRANULOCYTES # BLD AUTO: 0.02 THOU/MM3 (ref 0–0.07)
IMM GRANULOCYTES NFR BLD AUTO: 0.4 %
LDLC SERPL CALC-MCNC: 89 MG/DL
LYMPHOCYTES ABSOLUTE: 1.5 THOU/MM3 (ref 1–4.8)
LYMPHOCYTES NFR BLD AUTO: 29.5 %
MCH RBC QN AUTO: 30.6 PG (ref 26–33)
MCHC RBC AUTO-ENTMCNC: 32.3 GM/DL (ref 32.2–35.5)
MCV RBC AUTO: 94.6 FL (ref 81–99)
MICROALBUMIN UR-MCNC: 3.64 MG/DL
MICROALBUMIN/CREAT RATIO PNL UR: 31 MG/G (ref 0–30)
MONOCYTES ABSOLUTE: 0.3 THOU/MM3 (ref 0.4–1.3)
MONOCYTES NFR BLD AUTO: 6.5 %
NEUTROPHILS ABSOLUTE: 3 THOU/MM3 (ref 1.8–7.7)
NEUTROPHILS NFR BLD AUTO: 58.7 %
NRBC BLD AUTO-RTO: 0 /100 WBC
PLATELET # BLD AUTO: 174 THOU/MM3 (ref 130–400)
PMV BLD AUTO: 9.8 FL (ref 9.4–12.4)
POTASSIUM SERPL-SCNC: 4.4 MEQ/L (ref 3.5–5.2)
PROT SERPL-MCNC: 6.6 G/DL (ref 6.4–8.3)
RBC # BLD AUTO: 3.53 MILL/MM3 (ref 4.2–5.4)
SODIUM SERPL-SCNC: 143 MEQ/L (ref 135–145)
TRIGL SERPL-MCNC: 61 MG/DL (ref 0–199)
TSH SERPL DL<=0.05 MIU/L-ACNC: 2.29 UIU/ML (ref 0.27–4.2)
WBC # BLD AUTO: 5.1 THOU/MM3 (ref 4.8–10.8)

## 2025-04-09 PROCEDURE — 3078F DIAST BP <80 MM HG: CPT | Performed by: FAMILY MEDICINE

## 2025-04-09 PROCEDURE — 1090F PRES/ABSN URINE INCON ASSESS: CPT | Performed by: FAMILY MEDICINE

## 2025-04-09 PROCEDURE — G8427 DOCREV CUR MEDS BY ELIG CLIN: HCPCS | Performed by: FAMILY MEDICINE

## 2025-04-09 PROCEDURE — G8417 CALC BMI ABV UP PARAM F/U: HCPCS | Performed by: FAMILY MEDICINE

## 2025-04-09 PROCEDURE — 1159F MED LIST DOCD IN RCRD: CPT | Performed by: FAMILY MEDICINE

## 2025-04-09 PROCEDURE — 1036F TOBACCO NON-USER: CPT | Performed by: FAMILY MEDICINE

## 2025-04-09 PROCEDURE — 1123F ACP DISCUSS/DSCN MKR DOCD: CPT | Performed by: FAMILY MEDICINE

## 2025-04-09 PROCEDURE — 3074F SYST BP LT 130 MM HG: CPT | Performed by: FAMILY MEDICINE

## 2025-04-09 PROCEDURE — 99215 OFFICE O/P EST HI 40 MIN: CPT | Performed by: FAMILY MEDICINE

## 2025-04-09 PROCEDURE — G8399 PT W/DXA RESULTS DOCUMENT: HCPCS | Performed by: FAMILY MEDICINE

## 2025-04-10 ENCOUNTER — TELEPHONE (OUTPATIENT)
Dept: FAMILY MEDICINE CLINIC | Age: 80
End: 2025-04-10

## 2025-04-10 NOTE — TELEPHONE ENCOUNTER
Mark Lord, DO  [facility] Clinical Staff  Please let pt know that labs look good other than has a bit of a worsening anemia.  I've ordered f/u labs to ensure b12/folate and iron stores are ok  Next few wks is fine, fasting  Let me know if questions, thanks!

## 2025-04-12 ENCOUNTER — LAB (OUTPATIENT)
Dept: LAB | Age: 80
End: 2025-04-12

## 2025-04-12 DIAGNOSIS — D64.9 NORMOCYTIC ANEMIA: ICD-10-CM

## 2025-04-12 LAB
BASOPHILS ABSOLUTE: 0.1 THOU/MM3 (ref 0–0.1)
BASOPHILS NFR BLD AUTO: 1.1 %
DEPRECATED RDW RBC AUTO: 47.2 FL (ref 35–45)
EOSINOPHIL NFR BLD AUTO: 4.2 %
EOSINOPHILS ABSOLUTE: 0.2 THOU/MM3 (ref 0–0.4)
ERYTHROCYTE [DISTWIDTH] IN BLOOD BY AUTOMATED COUNT: 13.5 % (ref 11.5–14.5)
FERRITIN SERPL IA-MCNC: 116 NG/ML (ref 13–150)
FOLATE SERPL-MCNC: > 20 NG/ML (ref 4.6–34.8)
HCT VFR BLD AUTO: 35.6 % (ref 37–47)
HGB BLD-MCNC: 11.5 GM/DL (ref 12–16)
IMM GRANULOCYTES # BLD AUTO: 0.01 THOU/MM3 (ref 0–0.07)
IMM GRANULOCYTES NFR BLD AUTO: 0.2 %
IRON SATN MFR SERPL: 20 % (ref 20–50)
IRON SERPL-MCNC: 56 UG/DL (ref 37–145)
LYMPHOCYTES ABSOLUTE: 1.8 THOU/MM3 (ref 1–4.8)
LYMPHOCYTES NFR BLD AUTO: 37.9 %
MCH RBC QN AUTO: 30.7 PG (ref 26–33)
MCHC RBC AUTO-ENTMCNC: 32.3 GM/DL (ref 32.2–35.5)
MCV RBC AUTO: 95.2 FL (ref 81–99)
MONOCYTES ABSOLUTE: 0.4 THOU/MM3 (ref 0.4–1.3)
MONOCYTES NFR BLD AUTO: 8.3 %
NEUTROPHILS ABSOLUTE: 2.3 THOU/MM3 (ref 1.8–7.7)
NEUTROPHILS NFR BLD AUTO: 48.3 %
NRBC BLD AUTO-RTO: 0 /100 WBC
PLATELET # BLD AUTO: 191 THOU/MM3 (ref 130–400)
PMV BLD AUTO: 10.2 FL (ref 9.4–12.4)
RBC # BLD AUTO: 3.74 MILL/MM3 (ref 4.2–5.4)
TIBC SERPL-MCNC: 274 UG/DL (ref 171–450)
VIT B12 SERPL-MCNC: > 2000 PG/ML (ref 232–1245)
WBC # BLD AUTO: 4.7 THOU/MM3 (ref 4.8–10.8)

## 2025-04-13 ENCOUNTER — RESULTS FOLLOW-UP (OUTPATIENT)
Dept: FAMILY MEDICINE CLINIC | Age: 80
End: 2025-04-13

## 2025-04-14 ENCOUNTER — TELEPHONE (OUTPATIENT)
Dept: FAMILY MEDICINE CLINIC | Age: 80
End: 2025-04-14

## 2025-04-14 SDOH — HEALTH STABILITY: PHYSICAL HEALTH: ON AVERAGE, HOW MANY MINUTES DO YOU ENGAGE IN EXERCISE AT THIS LEVEL?: 0 MIN

## 2025-04-14 SDOH — HEALTH STABILITY: PHYSICAL HEALTH: ON AVERAGE, HOW MANY DAYS PER WEEK DO YOU ENGAGE IN MODERATE TO STRENUOUS EXERCISE (LIKE A BRISK WALK)?: 0 DAYS

## 2025-04-14 ASSESSMENT — PATIENT HEALTH QUESTIONNAIRE - PHQ9
8. MOVING OR SPEAKING SO SLOWLY THAT OTHER PEOPLE COULD HAVE NOTICED. OR THE OPPOSITE, BEING SO FIGETY OR RESTLESS THAT YOU HAVE BEEN MOVING AROUND A LOT MORE THAN USUAL: NOT AT ALL
SUM OF ALL RESPONSES TO PHQ QUESTIONS 1-9: 2
6. FEELING BAD ABOUT YOURSELF - OR THAT YOU ARE A FAILURE OR HAVE LET YOURSELF OR YOUR FAMILY DOWN: NOT AT ALL
7. TROUBLE CONCENTRATING ON THINGS, SUCH AS READING THE NEWSPAPER OR WATCHING TELEVISION: NOT AT ALL
SUM OF ALL RESPONSES TO PHQ QUESTIONS 1-9: 2
SUM OF ALL RESPONSES TO PHQ QUESTIONS 1-9: 2
9. THOUGHTS THAT YOU WOULD BE BETTER OFF DEAD, OR OF HURTING YOURSELF: NOT AT ALL
5. POOR APPETITE OR OVEREATING: NOT AT ALL
3. TROUBLE FALLING OR STAYING ASLEEP: NOT AT ALL
10. IF YOU CHECKED OFF ANY PROBLEMS, HOW DIFFICULT HAVE THESE PROBLEMS MADE IT FOR YOU TO DO YOUR WORK, TAKE CARE OF THINGS AT HOME, OR GET ALONG WITH OTHER PEOPLE: NOT DIFFICULT AT ALL
2. FEELING DOWN, DEPRESSED OR HOPELESS: SEVERAL DAYS
4. FEELING TIRED OR HAVING LITTLE ENERGY: SEVERAL DAYS
SUM OF ALL RESPONSES TO PHQ QUESTIONS 1-9: 2
1. LITTLE INTEREST OR PLEASURE IN DOING THINGS: NOT AT ALL

## 2025-04-14 ASSESSMENT — LIFESTYLE VARIABLES
HOW OFTEN DO YOU HAVE A DRINK CONTAINING ALCOHOL: NEVER
HOW OFTEN DO YOU HAVE SIX OR MORE DRINKS ON ONE OCCASION: 1
HOW MANY STANDARD DRINKS CONTAINING ALCOHOL DO YOU HAVE ON A TYPICAL DAY: 0
HOW MANY STANDARD DRINKS CONTAINING ALCOHOL DO YOU HAVE ON A TYPICAL DAY: PATIENT DOES NOT DRINK
HOW OFTEN DO YOU HAVE A DRINK CONTAINING ALCOHOL: 1

## 2025-04-14 NOTE — PROGRESS NOTES
Chief Complaint   Patient presents with    Medicare AWV     History obtained from the patient.    SUBJECTIVE:  Alana Daniel is a 79 y.o. female that presents today for     -CHF PRIOR VISIT:   Patient admitted to [facility] from 5/17 to 5/22 for new onset HF.   D/c summary:   None at time of my evalation    Last cardio PN:  Plan:   Patient with non ischemic chf   continue medical rx    will need life vest placement    ambulate    will see at the office few weeks       Since discharge has done well. No SOB, orthopnea or pND. Has life vest. Has not done off. Tolerating meds. Has f/u with cardioJose, next wk. Doing daily wts, stable.       UPDATE PRIOR VISIT:   Overall doing better  Following with Jose  No CP/SOb  Pt told EF better, records pending, and off life vest.   Still having occ palp    UPDATE TODAY:   Repeat EF 55+% APR 2024  Has f/u echo ordered for JUNE 2025 by cardiology  No CP/sOB  On Entresto yet as well as BB and statin  Seeing new cardiologist, her previous cardiologist, Jose, retired  Has f/u scheduled  wts steady  Feeling well      -HTN/CKD3b:    HPI:     Taking meds as prescribed ?: yes  Tolerating well ?: yes  Side Effects ?: denies  BP at home ?: <140/90  Working on TLCS ?: yes  Chest Pain/SOB/Palpitations? denies    BP Readings from Last 3 Encounters:   04/15/25 128/68   04/09/25 124/60   01/16/25 104/68       -Homocystine PRIOr VISIT: noted on labs a few years ago. Not treated as far as she knows. Las labs 2016.     UPDATE TODAY:   on folate   Labs UTD      -HLD:    HPI:    Taking meds as prescribed ?: yes  Tolerating well ?: yes  Side Effects ?: denies  Muscle Pain?: denies  Working on TLCS ?: yes      -Chronic pain: has back issues and OA: uses tramadol. 60 tabs per 30 days. Was getting from Gavin, old PCP. Had asked me to take over. OAARS reviewed and appropriate. UTD on refills.        -Depression/Anxiety:  Zoloft inc to 100mg in JAN 2025 d/t inc depression  Moods improved  No

## 2025-04-14 NOTE — TELEPHONE ENCOUNTER
Mark Lord,   [facility]    Please let pt know that cbc is better.  Iron stores, B12 and Folic acid levels all appropriate as well.  Let me know if questions, thanks!

## 2025-04-15 ENCOUNTER — OFFICE VISIT (OUTPATIENT)
Dept: FAMILY MEDICINE CLINIC | Age: 80
End: 2025-04-15

## 2025-04-15 VITALS
WEIGHT: 186 LBS | DIASTOLIC BLOOD PRESSURE: 68 MMHG | HEART RATE: 67 BPM | HEIGHT: 62 IN | RESPIRATION RATE: 16 BRPM | TEMPERATURE: 97.8 F | BODY MASS INDEX: 34.23 KG/M2 | SYSTOLIC BLOOD PRESSURE: 128 MMHG | OXYGEN SATURATION: 98 %

## 2025-04-15 DIAGNOSIS — R79.83 HOMOCYSTEINEMIA: Chronic | ICD-10-CM

## 2025-04-15 DIAGNOSIS — M81.0 OSTEOPOROSIS, POST-MENOPAUSAL: Chronic | ICD-10-CM

## 2025-04-15 DIAGNOSIS — N18.32 STAGE 3B CHRONIC KIDNEY DISEASE (HCC): ICD-10-CM

## 2025-04-15 DIAGNOSIS — M15.0 PRIMARY OSTEOARTHRITIS INVOLVING MULTIPLE JOINTS: ICD-10-CM

## 2025-04-15 DIAGNOSIS — J45.20 MILD INTERMITTENT ASTHMA WITHOUT COMPLICATION: ICD-10-CM

## 2025-04-15 DIAGNOSIS — I10 ESSENTIAL HYPERTENSION: ICD-10-CM

## 2025-04-15 DIAGNOSIS — G89.29 CHRONIC BILATERAL LOW BACK PAIN WITHOUT SCIATICA: ICD-10-CM

## 2025-04-15 DIAGNOSIS — M54.50 CHRONIC BILATERAL LOW BACK PAIN WITHOUT SCIATICA: ICD-10-CM

## 2025-04-15 DIAGNOSIS — E78.5 DYSLIPIDEMIA: Chronic | ICD-10-CM

## 2025-04-15 DIAGNOSIS — I25.10 CORONARY ARTERY DISEASE INVOLVING NATIVE CORONARY ARTERY OF NATIVE HEART WITHOUT ANGINA PECTORIS: ICD-10-CM

## 2025-04-15 DIAGNOSIS — Z79.899 CONTROLLED SUBSTANCE AGREEMENT SIGNED: Chronic | ICD-10-CM

## 2025-04-15 DIAGNOSIS — Z00.00 MEDICARE ANNUAL WELLNESS VISIT, SUBSEQUENT: Primary | ICD-10-CM

## 2025-04-15 DIAGNOSIS — I50.32 CHRONIC HEART FAILURE WITH PRESERVED EJECTION FRACTION (HCC): ICD-10-CM

## 2025-04-15 DIAGNOSIS — F41.8 DEPRESSION WITH ANXIETY: ICD-10-CM

## 2025-04-15 PROBLEM — A08.11 NOROVIRUS: Status: RESOLVED | Noted: 2025-01-14 | Resolved: 2025-04-15

## 2025-04-15 PROBLEM — I25.119 ATHEROSCLEROTIC HEART DISEASE OF NATIVE CORONARY ARTERY WITH UNSPECIFIED ANGINA PECTORIS: Status: RESOLVED | Noted: 2024-06-10 | Resolved: 2025-04-15

## 2025-04-15 PROBLEM — I20.9 ANGINA PECTORIS, UNSPECIFIED: Status: RESOLVED | Noted: 2024-06-10 | Resolved: 2025-04-15

## 2025-04-15 RX ORDER — SERTRALINE HYDROCHLORIDE 100 MG/1
100 TABLET, FILM COATED ORAL DAILY
Qty: 90 TABLET | Refills: 3 | Status: SHIPPED | OUTPATIENT
Start: 2025-04-15

## 2025-04-16 RX ORDER — HYDROCORTISONE SODIUM SUCCINATE 100 MG/2ML
100 INJECTION INTRAMUSCULAR; INTRAVENOUS
OUTPATIENT
Start: 2025-04-23

## 2025-04-16 RX ORDER — SODIUM CHLORIDE 9 MG/ML
INJECTION, SOLUTION INTRAVENOUS CONTINUOUS
OUTPATIENT
Start: 2025-04-23

## 2025-04-16 RX ORDER — EPINEPHRINE 1 MG/ML
0.3 INJECTION, SOLUTION, CONCENTRATE INTRAVENOUS PRN
OUTPATIENT
Start: 2025-04-23

## 2025-04-16 RX ORDER — DIPHENHYDRAMINE HYDROCHLORIDE 50 MG/ML
50 INJECTION, SOLUTION INTRAMUSCULAR; INTRAVENOUS
OUTPATIENT
Start: 2025-04-23

## 2025-04-21 ENCOUNTER — E-VISIT (OUTPATIENT)
Dept: FAMILY MEDICINE CLINIC | Age: 80
End: 2025-04-21
Payer: MEDICARE

## 2025-04-21 DIAGNOSIS — J32.9 SINOBRONCHITIS: Primary | ICD-10-CM

## 2025-04-21 DIAGNOSIS — J40 SINOBRONCHITIS: Primary | ICD-10-CM

## 2025-04-21 PROCEDURE — 99421 OL DIG E/M SVC 5-10 MIN: CPT | Performed by: FAMILY MEDICINE

## 2025-04-21 RX ORDER — PREDNISONE 20 MG/1
40 TABLET ORAL DAILY
Qty: 10 TABLET | Refills: 0 | Status: SHIPPED | OUTPATIENT
Start: 2025-04-21 | End: 2025-04-26

## 2025-04-21 RX ORDER — BENZONATATE 100 MG/1
CAPSULE ORAL
Qty: 60 CAPSULE | Refills: 0 | Status: SHIPPED | OUTPATIENT
Start: 2025-04-21 | End: 2025-05-01

## 2025-04-21 RX ORDER — DOXYCYCLINE HYCLATE 100 MG
100 TABLET ORAL 2 TIMES DAILY
Qty: 20 TABLET | Refills: 0 | Status: SHIPPED | OUTPATIENT
Start: 2025-04-21 | End: 2025-05-01

## 2025-04-21 ASSESSMENT — LIFESTYLE VARIABLES: SMOKING_STATUS: NO, I HAVE NEVER SMOKED

## 2025-04-21 NOTE — PROGRESS NOTES
Treat as sinus  She will f/u if no better  5 min spent     Diagnosis Orders   1. Sinobronchitis  doxycycline hyclate (VIBRA-TABS) 100 MG tablet    predniSONE (DELTASONE) 20 MG tablet    benzonatate (TESSALON PERLES) 100 MG capsule

## 2025-05-13 DIAGNOSIS — Z79.899 CONTROLLED SUBSTANCE AGREEMENT SIGNED: ICD-10-CM

## 2025-05-13 DIAGNOSIS — I50.22 HYPERTENSIVE HEART DISEASE WITH CHRONIC SYSTOLIC CONGESTIVE HEART FAILURE (HCC): ICD-10-CM

## 2025-05-13 DIAGNOSIS — I11.0 HYPERTENSIVE HEART DISEASE WITH CHRONIC SYSTOLIC CONGESTIVE HEART FAILURE (HCC): ICD-10-CM

## 2025-05-13 DIAGNOSIS — M15.0 PRIMARY OSTEOARTHRITIS INVOLVING MULTIPLE JOINTS: Primary | ICD-10-CM

## 2025-05-13 DIAGNOSIS — I50.1 HEART FAILURE, LEFT, WITH LVEF <=30% (HCC): ICD-10-CM

## 2025-05-13 RX ORDER — TRAMADOL HYDROCHLORIDE 50 MG/1
50 TABLET ORAL 2 TIMES DAILY PRN
Qty: 60 TABLET | Refills: 2 | Status: SHIPPED | OUTPATIENT
Start: 2025-05-13 | End: 2025-08-11

## 2025-05-13 RX ORDER — POTASSIUM CHLORIDE 750 MG/1
TABLET, EXTENDED RELEASE ORAL
Qty: 180 TABLET | Refills: 0 | Status: SHIPPED | OUTPATIENT
Start: 2025-05-13

## 2025-05-13 NOTE — TELEPHONE ENCOUNTER
Recent Visits  Date Type Provider Dept   04/15/25 Office Visit Mark Lord, DO [facility]   01/16/25 Office Visit Mark Lord, DO [facility]   10/15/24 Office Visit Mark Lord, DO [facility]   04/11/24 Office Visit Mark Lord, DO [facility]   Showing recent visits within past 540 days with a meds authorizing provider and meeting all other requirements  Future Appointments  No visits were found meeting these conditions.  Showing future appointments within next 150 days with a meds authorizing provider and meeting all other requirements

## 2025-05-15 ENCOUNTER — HOSPITAL ENCOUNTER (OUTPATIENT)
Dept: NURSING | Age: 80
Discharge: HOME | End: 2025-05-15
Payer: MEDICARE

## 2025-05-15 VITALS
OXYGEN SATURATION: 95 % | TEMPERATURE: 97.8 F | DIASTOLIC BLOOD PRESSURE: 51 MMHG | HEART RATE: 76 BPM | SYSTOLIC BLOOD PRESSURE: 102 MMHG | RESPIRATION RATE: 18 BRPM

## 2025-05-15 DIAGNOSIS — M81.0 SENILE OSTEOPOROSIS: Primary | ICD-10-CM

## 2025-05-15 PROCEDURE — 96372 THER/PROPH/DIAG INJ SC/IM: CPT

## 2025-05-15 PROCEDURE — 6360000002 HC RX W HCPCS: Performed by: FAMILY MEDICINE

## 2025-05-15 RX ORDER — SODIUM CHLORIDE 9 MG/ML
INJECTION, SOLUTION INTRAVENOUS CONTINUOUS
OUTPATIENT
Start: 2025-11-13

## 2025-05-15 RX ORDER — HYDROCORTISONE SODIUM SUCCINATE 100 MG/2ML
100 INJECTION INTRAMUSCULAR; INTRAVENOUS
OUTPATIENT
Start: 2025-11-13

## 2025-05-15 RX ORDER — DIPHENHYDRAMINE HYDROCHLORIDE 50 MG/ML
50 INJECTION, SOLUTION INTRAMUSCULAR; INTRAVENOUS
OUTPATIENT
Start: 2025-11-13

## 2025-05-15 RX ORDER — EPINEPHRINE 1 MG/ML
0.3 INJECTION, SOLUTION INTRAMUSCULAR; SUBCUTANEOUS PRN
OUTPATIENT
Start: 2025-11-13

## 2025-05-15 RX ADMIN — DENOSUMAB 60 MG: 60 INJECTION SUBCUTANEOUS at 14:29

## 2025-05-15 ASSESSMENT — PAIN DESCRIPTION - ORIENTATION: ORIENTATION: RIGHT;LEFT

## 2025-05-15 ASSESSMENT — PAIN SCALES - GENERAL: PAINLEVEL_OUTOF10: 3

## 2025-05-15 ASSESSMENT — PAIN DESCRIPTION - LOCATION: LOCATION: KNEE

## 2025-05-15 NOTE — PROGRESS NOTES
Lisseth ambulated into room with cane for prolia injection. Pt rights and responsibilities offered to her. Injection given and Lisseth tolerated well. D/c instruction discussed and Lisseth verbalized understanding. Lisseth ambulated out for d/c with friend today and with cane.             m____ Safety:       (Environmental)  [place] to environment  Ensure ID band is correct and in place/ allergy band as needed  Assess for fall risk  Initiate fall precautions as applicable (fall band, side rails, etc.)  Call light within reach  Bed in low position/ wheels locked    m____ Pain:       Assess pain level and characteristics  Administer analgesics as ordered  Assess effectiveness of pain management and report to MD as needed    _m___ Knowledge Deficit:  Assess baseline knowledge  Provide teaching at level of understanding  Provide teaching via preferred learning method  Evaluate teaching effectiveness    _m___ Hemodynamic/Respiratory Status:       (Pre and Post Procedure Monitoring)  Assess/Monitor vital signs and LOC  Assess Baseline SpO2 prior to any sedation  Obtain weight/height  Assess vital signs/ LOC until patient meets discharge criteria  Monitor procedure site and notify MD of any issues

## 2025-05-15 NOTE — DISCHARGE INSTRUCTIONS
Prolia injection discharge instructions:    Side effects: headache, joint pain, rash, swelling    Next Prolia injection on:   NOVEMBER 18  @  2  PM    This medication is given every 6 months. We will need a new order before we schedule your next one due around:     Please call [phone] with a any questions or concerns.

## 2025-06-09 ENCOUNTER — HOSPITAL ENCOUNTER (OUTPATIENT)
Age: 80
Discharge: HOME | End: 2025-06-11
Attending: INTERNAL MEDICINE
Payer: MEDICARE

## 2025-06-09 DIAGNOSIS — I20.9 ANGINA PECTORIS, UNSPECIFIED: ICD-10-CM

## 2025-06-09 DIAGNOSIS — I25.10 CORONARY ARTERY DISEASE INVOLVING NATIVE CORONARY ARTERY OF NATIVE HEART WITHOUT ANGINA PECTORIS: ICD-10-CM

## 2025-06-09 DIAGNOSIS — I10 HYPERTENSION, UNSPECIFIED TYPE: ICD-10-CM

## 2025-06-09 DIAGNOSIS — I42.9 CARDIOMYOPATHY, UNSPECIFIED TYPE (HCC): ICD-10-CM

## 2025-06-09 DIAGNOSIS — E66.01 SEVERE OBESITY (BMI 35.0-39.9) WITH COMORBIDITY (HCC): ICD-10-CM

## 2025-06-09 DIAGNOSIS — I25.759: ICD-10-CM

## 2025-06-09 LAB
ECHO AO ASC DIAM: 3.6 CM
ECHO AV CUSP MM: 2.2 CM
ECHO AV PEAK GRADIENT: 5 MMHG
ECHO AV PEAK VELOCITY: 1.1 M/S
ECHO AV VELOCITY RATIO: 0.82
ECHO EST RA PRESSURE: 3 MMHG
ECHO LA AREA 2C: 17.3 CM2
ECHO LA AREA 4C: 13.9 CM2
ECHO LA DIAMETER: 3.6 CM
ECHO LA MAJOR AXIS: 4.9 CM
ECHO LA MINOR AXIS: 5.4 CM
ECHO LA VOL BP: 40 ML (ref 22–52)
ECHO LA VOL MOD A2C: 45 ML (ref 22–52)
ECHO LA VOL MOD A4C: 32 ML (ref 22–52)
ECHO LV E' LATERAL VELOCITY: 7.4 CM/S
ECHO LV E' SEPTAL VELOCITY: 5.3 CM/S
ECHO LV EF PHYSICIAN: 55 %
ECHO LV FRACTIONAL SHORTENING: 29 % (ref 28–44)
ECHO LV INTERNAL DIMENSION DIASTOLIC: 4.8 CM (ref 3.9–5.3)
ECHO LV INTERNAL DIMENSION SYSTOLIC: 3.4 CM
ECHO LV ISOVOLUMETRIC RELAXATION TIME (IVRT): 81 MS
ECHO LV IVSD: 0.9 CM (ref 0.6–0.9)
ECHO LV MASS 2D: 147.8 G (ref 67–162)
ECHO LV POSTERIOR WALL DIASTOLIC: 0.9 CM (ref 0.6–0.9)
ECHO LV RELATIVE WALL THICKNESS RATIO: 0.38
ECHO LVOT PEAK GRADIENT: 3 MMHG
ECHO LVOT PEAK VELOCITY: 0.9 M/S
ECHO MV A VELOCITY: 0.84 M/S
ECHO MV E DECELERATION TIME (DT): 345 MS
ECHO MV E VELOCITY: 0.7 M/S
ECHO MV E/A RATIO: 0.83
ECHO MV E/E' LATERAL: 9.46
ECHO MV E/E' RATIO (AVERAGED): 11.33
ECHO MV E/E' SEPTAL: 13.21
ECHO MV REGURGITANT PEAK GRADIENT: 49 MMHG
ECHO MV REGURGITANT PEAK VELOCITY: 3.5 M/S
ECHO PULMONARY ARTERY END DIASTOLIC PRESSURE: 4 MMHG
ECHO PV MAX VELOCITY: 0.8 M/S
ECHO PV PEAK GRADIENT: 2 MMHG
ECHO PV REGURGITANT MAX VELOCITY: 1 M/S
ECHO RIGHT VENTRICULAR SYSTOLIC PRESSURE (RVSP): 23 MMHG
ECHO RV INTERNAL DIMENSION: 3.6 CM
ECHO RV TAPSE: 2.3 CM (ref 1.7–?)
ECHO TV E WAVE: 0.8 M/S
ECHO TV REGURGITANT MAX VELOCITY: 2.23 M/S
ECHO TV REGURGITANT PEAK GRADIENT: 20 MMHG

## 2025-06-09 PROCEDURE — 93306 TTE W/DOPPLER COMPLETE: CPT | Performed by: INTERNAL MEDICINE

## 2025-06-09 PROCEDURE — 93306 TTE W/DOPPLER COMPLETE: CPT

## 2025-06-18 NOTE — PATIENT INSTRUCTIONS
Your Provider for Today: Dr. Kumar  Your nurses for today: Melodie    You may receive a survey regarding the care you received during your visit.  Your input is valuable to us.  We encourage you to complete and return your survey.  We hope you will choose us in the future for your healthcare needs.

## 2025-06-19 ENCOUNTER — OFFICE VISIT (OUTPATIENT)
Dept: CARDIOLOGY CLINIC | Age: 80
End: 2025-06-19
Payer: MEDICARE

## 2025-06-19 VITALS
WEIGHT: 180 LBS | SYSTOLIC BLOOD PRESSURE: 126 MMHG | DIASTOLIC BLOOD PRESSURE: 80 MMHG | HEART RATE: 67 BPM | HEIGHT: 62 IN | BODY MASS INDEX: 33.13 KG/M2

## 2025-06-19 DIAGNOSIS — I25.10 CORONARY ARTERY DISEASE INVOLVING NATIVE CORONARY ARTERY OF NATIVE HEART WITHOUT ANGINA PECTORIS: Primary | ICD-10-CM

## 2025-06-19 PROCEDURE — G8399 PT W/DXA RESULTS DOCUMENT: HCPCS | Performed by: INTERNAL MEDICINE

## 2025-06-19 PROCEDURE — 99214 OFFICE O/P EST MOD 30 MIN: CPT | Performed by: INTERNAL MEDICINE

## 2025-06-19 PROCEDURE — G8427 DOCREV CUR MEDS BY ELIG CLIN: HCPCS | Performed by: INTERNAL MEDICINE

## 2025-06-19 PROCEDURE — 3074F SYST BP LT 130 MM HG: CPT | Performed by: INTERNAL MEDICINE

## 2025-06-19 PROCEDURE — 3079F DIAST BP 80-89 MM HG: CPT | Performed by: INTERNAL MEDICINE

## 2025-06-19 PROCEDURE — 1123F ACP DISCUSS/DSCN MKR DOCD: CPT | Performed by: INTERNAL MEDICINE

## 2025-06-19 PROCEDURE — 1159F MED LIST DOCD IN RCRD: CPT | Performed by: INTERNAL MEDICINE

## 2025-06-19 PROCEDURE — 1090F PRES/ABSN URINE INCON ASSESS: CPT | Performed by: INTERNAL MEDICINE

## 2025-06-19 PROCEDURE — 93000 ELECTROCARDIOGRAM COMPLETE: CPT | Performed by: INTERNAL MEDICINE

## 2025-06-19 PROCEDURE — 1036F TOBACCO NON-USER: CPT | Performed by: INTERNAL MEDICINE

## 2025-06-19 PROCEDURE — G8417 CALC BMI ABV UP PARAM F/U: HCPCS | Performed by: INTERNAL MEDICINE

## 2025-06-19 NOTE — PROGRESS NOTES
reactive to light.   Neck: Normal range of motion. Neck supple. No JVD present.   Cardiovascular: Normal rate , normal heart sounds and intact distal pulses.    Pulmonary/Chest: Effort normal and breath sounds normal. No respiratory distress. No wheezes. No rales.   Abdominal: Soft. Bowel sounds are normal. No distension. There is no tenderness.   Musculoskeletal: Normal range of motion. No edema.   Neurological: Alert and oriented to person, place, and time. No cranial nerve deficit. Coordination normal.   Skin: Skin is warm and dry.   Psychiatric: Normal mood and affect.       Lab Results   Component Value Date/Time    CKTOTAL 72 09/03/2014 12:45 PM       Lab Results   Component Value Date/Time    WBC 4.7 04/12/2025 11:12 AM    RBC 3.74 04/12/2025 11:12 AM    RBC 0-2 07/13/2011 11:15 AM    HGB 11.5 04/12/2025 11:12 AM    HGB NEGATIVE 07/13/2011 11:15 AM    HCT 35.6 04/12/2025 11:12 AM    MCV 95.2 04/12/2025 11:12 AM    MCH 30.7 04/12/2025 11:12 AM    MCHC 32.3 04/12/2025 11:12 AM    RDW 12.8 10/08/2019 12:59 PM     04/12/2025 11:12 AM    MPV 10.2 04/12/2025 11:12 AM       Lab Results   Component Value Date/Time     04/09/2025 12:00 PM    K 4.4 04/09/2025 12:00 PM    K 3.8 05/20/2019 07:45 AM     04/09/2025 12:00 PM    CO2 21 04/09/2025 12:00 PM    BUN 36 04/09/2025 12:00 PM    CREATININE 1.5 04/09/2025 12:00 PM    CALCIUM 9.4 04/09/2025 12:00 PM    LABGLOM 35 04/09/2025 12:00 PM    LABGLOM 33 04/06/2024 09:57 AM    GLUCOSE 95 04/09/2025 12:00 PM    GLUCOSE 95 10/08/2019 12:59 PM       Lab Results   Component Value Date/Time    ALKPHOS 52 04/09/2025 12:00 PM    ALT 14 04/09/2025 12:00 PM    AST 24 04/09/2025 12:00 PM    BILITOT 0.7 04/09/2025 12:00 PM    BILIDIR <0.2 06/20/2023 03:43 PM       Lab Results   Component Value Date/Time    MG 2.3 04/03/2023 02:04 PM       No results found for: \"INR\", \"PROTIME\"      Lab Results   Component Value Date/Time    LABA1C 5.0 04/03/2023 02:04 PM       Lab

## 2025-06-20 DIAGNOSIS — I50.1 HEART FAILURE, LEFT, WITH LVEF <=30% (HCC): ICD-10-CM

## 2025-06-20 DIAGNOSIS — I11.0 HYPERTENSIVE HEART DISEASE WITH CHRONIC SYSTOLIC CONGESTIVE HEART FAILURE (HCC): ICD-10-CM

## 2025-06-20 DIAGNOSIS — I50.22 HYPERTENSIVE HEART DISEASE WITH CHRONIC SYSTOLIC CONGESTIVE HEART FAILURE (HCC): ICD-10-CM

## 2025-06-20 RX ORDER — BUMETANIDE 2 MG/1
2 TABLET ORAL DAILY
Qty: 90 TABLET | Refills: 0 | Status: SHIPPED | OUTPATIENT
Start: 2025-06-20

## 2025-06-20 NOTE — TELEPHONE ENCOUNTER
Recent Visits  Date Type Provider Dept   06/19/25 Office Visit Dean Kuamr MD [facility]   04/15/25 Office Visit Mark Lord, DO [facility]   01/16/25 Office Visit Mark Lord, DO [facility]   10/15/24 Office Visit Mark Lord, DO [facility]   06/10/24 Office Visit Dean Kumar MD [facility]   04/11/24 Office Visit Mark Lord, DO [facility]   Showing recent visits within past 540 days with a meds authorizing provider and meeting all other requirements  Future Appointments  Date Type Provider Dept   10/15/25 Appointment Mark Lord, DO [facility]   Showing future appointments within next 150 days with a meds authorizing provider and meeting all other requirements

## 2025-07-08 NOTE — PROGRESS NOTES
[facility]  [address]  [address]  [place] [address]  Dept: [phone]  Dept Fax: [phone]  Loc: [phone]      Alana Daniel is a 80 y.o. White female. Alana  presents to the [facility] today for No chief complaint on file.  , and;   No diagnosis found.      I have reviewed Alana Daniel medical, surgical and other pertinent history in detail, and have updated medication and allergy information in the computerized patient record.     Clinical Care Team:     -Referring Provider for today's consult: self  -Primary Care Provider: Mark Lord DO    Medical/Surgical History:   She  has a past medical history of Asthma, mild intermittent, CAD (coronary artery disease), Chronic low back pain, CKD (chronic kidney disease) stage 3, GFR 30-59 ml/min ([facility]), Controlled substance agreement signed; tramadol, Dyslipidemia, LYLA (generalized anxiety disorder), Heart failure with preserved ejection fraction ([facility]), History of gastroesophageal reflux (GERD), Homocysteinemia, Hypertension, Left bundle branch block, Obesity (BMI 30-39.9), SUNI on CPAP, Osteoarthritis, Osteoporosis, post-menopausal, Seasonal allergies, and Vitamin D deficiency.  Her  has a past surgical history that includes sinus surgery (1990); Rotator cuff repair (Bilateral, 2006  & 2009); Carpal tunnel release (08/2006); Rotator cuff repair (11/2009); Cardiac catheterization (10/30/14); eye surgery (Left, 04/23/2016); eye surgery (Right, 05/24/2006); Carpal tunnel release (Left, 2009); and Colonoscopy (2007).    Family/Social History:     Her family history includes Cancer in her brother and father; Dementia in her brother; Diabetes in her maternal grandmother; Glaucoma in her brother; Heart Disease in her maternal grandmother and mother.  She  reports that she has never smoked. She has been exposed to tobacco smoke. She has never used smokeless tobacco.

## 2025-07-09 ENCOUNTER — LAB (OUTPATIENT)
Dept: LAB | Age: 80
End: 2025-07-09

## 2025-07-09 ENCOUNTER — RESULTS FOLLOW-UP (OUTPATIENT)
Age: 80
End: 2025-07-09

## 2025-07-09 ENCOUNTER — OFFICE VISIT (OUTPATIENT)
Age: 80
End: 2025-07-09
Payer: MEDICARE

## 2025-07-09 VITALS
TEMPERATURE: 97.9 F | RESPIRATION RATE: 12 BRPM | OXYGEN SATURATION: 97 % | SYSTOLIC BLOOD PRESSURE: 104 MMHG | DIASTOLIC BLOOD PRESSURE: 62 MMHG | WEIGHT: 181.2 LBS | HEART RATE: 69 BPM | HEIGHT: 62 IN | BODY MASS INDEX: 33.34 KG/M2

## 2025-07-09 DIAGNOSIS — J45.22 MILD INTERMITTENT ASTHMA WITH STATUS ASTHMATICUS: Chronic | ICD-10-CM

## 2025-07-09 DIAGNOSIS — E55.9 VITAMIN D DEFICIENCY: Chronic | ICD-10-CM

## 2025-07-09 DIAGNOSIS — I10 PRIMARY HYPERTENSION: Chronic | ICD-10-CM

## 2025-07-09 DIAGNOSIS — E78.5 DYSLIPIDEMIA: Chronic | ICD-10-CM

## 2025-07-09 DIAGNOSIS — J45.22 MILD INTERMITTENT ASTHMA WITH STATUS ASTHMATICUS: Primary | Chronic | ICD-10-CM

## 2025-07-09 LAB
25(OH)D3 SERPL-MCNC: 36 NG/ML (ref 30–100)
BASOPHILS ABSOLUTE: 0.1 THOU/MM3 (ref 0–0.1)
BASOPHILS NFR BLD AUTO: 1.1 %
C-REACTIVE PROTEIN, HIGH SENSITIVITY: 1 MG/L (ref 0–3)
CALCIUM SERPL-MCNC: 8.9 MG/DL (ref 8.8–10.2)
DEPRECATED RDW RBC AUTO: 45.9 FL (ref 35–45)
EOSINOPHIL NFR BLD AUTO: 4 %
EOSINOPHILS ABSOLUTE: 0.2 THOU/MM3 (ref 0–0.4)
ERYTHROCYTE [DISTWIDTH] IN BLOOD BY AUTOMATED COUNT: 13 % (ref 11.5–14.5)
ERYTHROCYTE [SEDIMENTATION RATE] IN BLOOD BY WESTERGREN METHOD: 24 MM/HR (ref 0–20)
HCT VFR BLD AUTO: 34.3 % (ref 37–47)
HGB BLD-MCNC: 11 GM/DL (ref 12–16)
IMM GRANULOCYTES # BLD AUTO: 0.01 THOU/MM3 (ref 0–0.07)
IMM GRANULOCYTES NFR BLD AUTO: 0.2 %
LYMPHOCYTES ABSOLUTE: 1.5 THOU/MM3 (ref 1–4.8)
LYMPHOCYTES NFR BLD AUTO: 32 %
MAGNESIUM SERPL-MCNC: 2.6 MG/DL (ref 1.6–2.6)
MCH RBC QN AUTO: 30.7 PG (ref 26–33)
MCHC RBC AUTO-ENTMCNC: 32.1 GM/DL (ref 32.2–35.5)
MCV RBC AUTO: 95.8 FL (ref 81–99)
MONOCYTES ABSOLUTE: 0.4 THOU/MM3 (ref 0.4–1.3)
MONOCYTES NFR BLD AUTO: 7.8 %
NEUTROPHILS ABSOLUTE: 2.6 THOU/MM3 (ref 1.8–7.7)
NEUTROPHILS NFR BLD AUTO: 54.9 %
NRBC BLD AUTO-RTO: 0 /100 WBC
PLATELET # BLD AUTO: 186 THOU/MM3 (ref 130–400)
PMV BLD AUTO: 10.2 FL (ref 9.4–12.4)
PTH-INTACT SERPL-MCNC: 224 PG/ML (ref 15–65)
RBC # BLD AUTO: 3.58 MILL/MM3 (ref 4.2–5.4)
WBC # BLD AUTO: 4.7 THOU/MM3 (ref 4.8–10.8)

## 2025-07-09 PROCEDURE — 1123F ACP DISCUSS/DSCN MKR DOCD: CPT | Performed by: FAMILY MEDICINE

## 2025-07-09 PROCEDURE — 3074F SYST BP LT 130 MM HG: CPT | Performed by: FAMILY MEDICINE

## 2025-07-09 PROCEDURE — G8427 DOCREV CUR MEDS BY ELIG CLIN: HCPCS | Performed by: FAMILY MEDICINE

## 2025-07-09 PROCEDURE — G8417 CALC BMI ABV UP PARAM F/U: HCPCS | Performed by: FAMILY MEDICINE

## 2025-07-09 PROCEDURE — 1036F TOBACCO NON-USER: CPT | Performed by: FAMILY MEDICINE

## 2025-07-09 PROCEDURE — G8399 PT W/DXA RESULTS DOCUMENT: HCPCS | Performed by: FAMILY MEDICINE

## 2025-07-09 PROCEDURE — 99214 OFFICE O/P EST MOD 30 MIN: CPT | Performed by: FAMILY MEDICINE

## 2025-07-09 PROCEDURE — 1159F MED LIST DOCD IN RCRD: CPT | Performed by: FAMILY MEDICINE

## 2025-07-09 PROCEDURE — 1090F PRES/ABSN URINE INCON ASSESS: CPT | Performed by: FAMILY MEDICINE

## 2025-07-09 PROCEDURE — 3078F DIAST BP <80 MM HG: CPT | Performed by: FAMILY MEDICINE

## 2025-07-09 ASSESSMENT — PATIENT HEALTH QUESTIONNAIRE - PHQ9
1. LITTLE INTEREST OR PLEASURE IN DOING THINGS: NOT AT ALL
4. FEELING TIRED OR HAVING LITTLE ENERGY: NOT AT ALL
7. TROUBLE CONCENTRATING ON THINGS, SUCH AS READING THE NEWSPAPER OR WATCHING TELEVISION: NOT AT ALL
SUM OF ALL RESPONSES TO PHQ QUESTIONS 1-9: 0
10. IF YOU CHECKED OFF ANY PROBLEMS, HOW DIFFICULT HAVE THESE PROBLEMS MADE IT FOR YOU TO DO YOUR WORK, TAKE CARE OF THINGS AT HOME, OR GET ALONG WITH OTHER PEOPLE: NOT DIFFICULT AT ALL
5. POOR APPETITE OR OVEREATING: NOT AT ALL
8. MOVING OR SPEAKING SO SLOWLY THAT OTHER PEOPLE COULD HAVE NOTICED. OR THE OPPOSITE, BEING SO FIGETY OR RESTLESS THAT YOU HAVE BEEN MOVING AROUND A LOT MORE THAN USUAL: NOT AT ALL
9. THOUGHTS THAT YOU WOULD BE BETTER OFF DEAD, OR OF HURTING YOURSELF: NOT AT ALL
6. FEELING BAD ABOUT YOURSELF - OR THAT YOU ARE A FAILURE OR HAVE LET YOURSELF OR YOUR FAMILY DOWN: NOT AT ALL
SUM OF ALL RESPONSES TO PHQ QUESTIONS 1-9: 0
SUM OF ALL RESPONSES TO PHQ QUESTIONS 1-9: 0
3. TROUBLE FALLING OR STAYING ASLEEP: NOT AT ALL
2. FEELING DOWN, DEPRESSED OR HOPELESS: NOT AT ALL
SUM OF ALL RESPONSES TO PHQ QUESTIONS 1-9: 0

## 2025-07-10 DIAGNOSIS — I25.10 CORONARY ARTERY DISEASE DUE TO LIPID RICH PLAQUE: Primary | Chronic | ICD-10-CM

## 2025-07-10 DIAGNOSIS — I25.83 CORONARY ARTERY DISEASE DUE TO LIPID RICH PLAQUE: Primary | Chronic | ICD-10-CM

## 2025-07-10 DIAGNOSIS — N18.30 STAGE 3 CHRONIC KIDNEY DISEASE, UNSPECIFIED WHETHER STAGE 3A OR 3B CKD (HCC): Chronic | ICD-10-CM

## 2025-07-10 DIAGNOSIS — D50.0 IRON DEFICIENCY ANEMIA DUE TO CHRONIC BLOOD LOSS: ICD-10-CM

## 2025-07-10 RX ORDER — PRAVASTATIN SODIUM 40 MG
40 TABLET ORAL DAILY
Qty: 90 TABLET | Refills: 3 | Status: SHIPPED | OUTPATIENT
Start: 2025-07-10

## 2025-07-10 NOTE — TELEPHONE ENCOUNTER
Recent Visits  Date Type Provider Dept   04/15/25 Office Visit Mark Lord, DO [facility]   01/16/25 Office Visit Mark Lord, DO [facility]   10/15/24 Office Visit Mark Lord, DO [facility]   04/11/24 Office Visit Mark Lord, DO [facility]   Showing recent visits within past 540 days with a meds authorizing provider and meeting all other requirements  Future Appointments  Date Type Provider Dept   10/15/25 Appointment Mark Lord, DO [facility]   Showing future appointments within next 150 days with a meds authorizing provider and meeting all other requirements

## 2025-07-21 NOTE — TELEPHONE ENCOUNTER
ASSESSMENT & PLAN   Diagnosis Orders   1. Primary osteoarthritis involving multiple joints  traMADol (ULTRAM) 50 MG tablet      2. Controlled substance agreement signed; tramadol  traMADol (ULTRAM) 50 MG tablet          OAARS reviewed and appropriate.     Controlled Substances Monitoring: Periodic Controlled Substance Monitoring: Possible medication side effects, risk of tolerance/dependence & alternative treatments discussed., No signs of potential drug abuse or diversion identified., Assessed functional status (ability to engage in work or other purposeful activities, the pain intensity and its interference with activities of daily living, quality of family life and social activities, and the physical activity), Obtaining appropriate analgesic effect of treatment. (Mark Lord, )      Future Appointments   Date Time Provider Department Center   5/15/2025  2:00 PM STR MINOR ROOM 7 [facility]   6/9/2025  1:45 PM STR ECHO 1 [facility] STR Rad/Card   6/19/2025  2:15 PM Dean Kumar MD N [facility] [place]   7/9/2025 10:00 AM Marin Glass MD [facility] [place]   10/15/2025  2:20 PM Mark Lord DO [facility]      Duration Of Freeze Thaw-Cycle (Seconds): 0 Show Applicator Variable?: Yes Detail Level: Simple Render Note In Bullet Format When Appropriate: No Post-Care Instructions: I reviewed with the patient in detail post-care instructions. Patient is to wear sunprotection, and avoid picking at any of the treated lesions. Pt may apply Vaseline to crusted or scabbing areas. Consent: The patient's consent was obtained including but not limited to risks of crusting, scabbing, blistering, scarring, darker or lighter pigmentary change, recurrence, incomplete removal and infection. Number Of Freeze-Thaw Cycles: 1 freeze-thaw cycle

## 2025-07-22 DIAGNOSIS — I50.32 CHRONIC HEART FAILURE WITH PRESERVED EJECTION FRACTION (HCC): ICD-10-CM

## 2025-07-22 RX ORDER — SACUBITRIL AND VALSARTAN 24; 26 MG/1; MG/1
1 TABLET, FILM COATED ORAL 2 TIMES DAILY
Qty: 180 TABLET | Refills: 3 | Status: SHIPPED | OUTPATIENT
Start: 2025-07-22

## 2025-07-22 RX ORDER — CARVEDILOL 6.25 MG/1
6.25 TABLET ORAL 2 TIMES DAILY
Qty: 180 TABLET | Refills: 3 | Status: SHIPPED | OUTPATIENT
Start: 2025-07-22

## 2025-08-05 DIAGNOSIS — I50.1 HEART FAILURE, LEFT, WITH LVEF <=30% (HCC): ICD-10-CM

## 2025-08-05 DIAGNOSIS — I11.0 HYPERTENSIVE HEART DISEASE WITH CHRONIC SYSTOLIC CONGESTIVE HEART FAILURE (HCC): ICD-10-CM

## 2025-08-05 DIAGNOSIS — I50.22 HYPERTENSIVE HEART DISEASE WITH CHRONIC SYSTOLIC CONGESTIVE HEART FAILURE (HCC): ICD-10-CM

## 2025-08-05 RX ORDER — POTASSIUM CHLORIDE 750 MG/1
TABLET, EXTENDED RELEASE ORAL 2 TIMES DAILY
Qty: 180 TABLET | Refills: 3 | Status: SHIPPED | OUTPATIENT
Start: 2025-08-05

## 2025-08-19 ENCOUNTER — TELEPHONE (OUTPATIENT)
Age: 80
End: 2025-08-19

## 2025-08-19 DIAGNOSIS — M81.0 OSTEOPOROSIS, POST-MENOPAUSAL: Chronic | ICD-10-CM

## 2025-08-19 DIAGNOSIS — E78.5 DYSLIPIDEMIA: Chronic | ICD-10-CM

## 2025-08-19 DIAGNOSIS — M81.0 SENILE OSTEOPOROSIS: ICD-10-CM

## 2025-08-19 DIAGNOSIS — G47.33 OSA ON CPAP: Chronic | ICD-10-CM

## 2025-08-19 DIAGNOSIS — J35.8 TONSILLAR MASS: ICD-10-CM

## 2025-08-19 DIAGNOSIS — F41.1 GAD (GENERALIZED ANXIETY DISORDER): Chronic | ICD-10-CM

## 2025-08-19 DIAGNOSIS — Z79.899 CONTROLLED SUBSTANCE AGREEMENT SIGNED: Chronic | ICD-10-CM

## 2025-08-19 DIAGNOSIS — E66.9 OBESITY (BMI 30-39.9): Chronic | ICD-10-CM

## 2025-08-19 DIAGNOSIS — I25.10 CORONARY ARTERY DISEASE DUE TO CALCIFIED CORONARY LESION: Chronic | ICD-10-CM

## 2025-08-19 DIAGNOSIS — I50.33 ACUTE ON CHRONIC HEART FAILURE WITH PRESERVED EJECTION FRACTION (HCC): Chronic | ICD-10-CM

## 2025-08-19 DIAGNOSIS — I44.7 LEFT BUNDLE BRANCH BLOCK: Chronic | ICD-10-CM

## 2025-08-19 DIAGNOSIS — E55.9 VITAMIN D DEFICIENCY: Chronic | ICD-10-CM

## 2025-08-19 DIAGNOSIS — Z87.19 HISTORY OF GASTROESOPHAGEAL REFLUX (GERD): Chronic | ICD-10-CM

## 2025-08-19 DIAGNOSIS — G89.29 CHRONIC LEFT-SIDED LOW BACK PAIN WITH RIGHT-SIDED SCIATICA: Chronic | ICD-10-CM

## 2025-08-19 DIAGNOSIS — M16.31 OSTEOARTHRITIS RESULTING FROM RIGHT HIP DYSPLASIA: Chronic | ICD-10-CM

## 2025-08-19 DIAGNOSIS — J30.2 SEASONAL ALLERGIES: Chronic | ICD-10-CM

## 2025-08-19 DIAGNOSIS — I1A.0 RESISTANT HYPERTENSION: Chronic | ICD-10-CM

## 2025-08-19 DIAGNOSIS — M54.41 CHRONIC LEFT-SIDED LOW BACK PAIN WITH RIGHT-SIDED SCIATICA: Chronic | ICD-10-CM

## 2025-08-19 DIAGNOSIS — R79.83 HOMOCYSTEINEMIA: Chronic | ICD-10-CM

## 2025-08-19 DIAGNOSIS — N18.32 STAGE 3B CHRONIC KIDNEY DISEASE (HCC): Chronic | ICD-10-CM

## 2025-08-19 DIAGNOSIS — J45.22 MILD INTERMITTENT ASTHMA WITH STATUS ASTHMATICUS: Primary | Chronic | ICD-10-CM

## 2025-08-19 DIAGNOSIS — I25.84 CORONARY ARTERY DISEASE DUE TO CALCIFIED CORONARY LESION: Chronic | ICD-10-CM

## 2025-08-29 ENCOUNTER — LAB (OUTPATIENT)
Dept: LAB | Age: 80
End: 2025-08-29

## 2025-08-29 ENCOUNTER — RESULTS FOLLOW-UP (OUTPATIENT)
Age: 80
End: 2025-08-29

## 2025-08-29 DIAGNOSIS — N18.32 STAGE 3B CHRONIC KIDNEY DISEASE (HCC): Chronic | ICD-10-CM

## 2025-08-29 DIAGNOSIS — I25.84 CORONARY ARTERY DISEASE DUE TO CALCIFIED CORONARY LESION: Chronic | ICD-10-CM

## 2025-08-29 DIAGNOSIS — I50.33 ACUTE ON CHRONIC HEART FAILURE WITH PRESERVED EJECTION FRACTION (HCC): Chronic | ICD-10-CM

## 2025-08-29 DIAGNOSIS — J45.22 MILD INTERMITTENT ASTHMA WITH STATUS ASTHMATICUS: Chronic | ICD-10-CM

## 2025-08-29 DIAGNOSIS — E55.9 VITAMIN D DEFICIENCY: Chronic | ICD-10-CM

## 2025-08-29 DIAGNOSIS — I25.10 CORONARY ARTERY DISEASE DUE TO CALCIFIED CORONARY LESION: Chronic | ICD-10-CM

## 2025-08-29 DIAGNOSIS — M81.0 OSTEOPOROSIS, POST-MENOPAUSAL: Chronic | ICD-10-CM

## 2025-08-29 DIAGNOSIS — J35.8 TONSILLAR MASS: ICD-10-CM

## 2025-08-29 DIAGNOSIS — J30.2 SEASONAL ALLERGIES: Chronic | ICD-10-CM

## 2025-08-29 DIAGNOSIS — G47.33 OSA ON CPAP: Chronic | ICD-10-CM

## 2025-08-29 DIAGNOSIS — G89.29 CHRONIC LEFT-SIDED LOW BACK PAIN WITH RIGHT-SIDED SCIATICA: Chronic | ICD-10-CM

## 2025-08-29 DIAGNOSIS — M81.0 SENILE OSTEOPOROSIS: ICD-10-CM

## 2025-08-29 DIAGNOSIS — E78.5 DYSLIPIDEMIA: Chronic | ICD-10-CM

## 2025-08-29 DIAGNOSIS — I1A.0 RESISTANT HYPERTENSION: Chronic | ICD-10-CM

## 2025-08-29 DIAGNOSIS — F41.1 GAD (GENERALIZED ANXIETY DISORDER): Chronic | ICD-10-CM

## 2025-08-29 DIAGNOSIS — R79.83 HOMOCYSTEINEMIA: Chronic | ICD-10-CM

## 2025-08-29 DIAGNOSIS — Z87.19 HISTORY OF GASTROESOPHAGEAL REFLUX (GERD): Chronic | ICD-10-CM

## 2025-08-29 DIAGNOSIS — M16.31 OSTEOARTHRITIS RESULTING FROM RIGHT HIP DYSPLASIA: Chronic | ICD-10-CM

## 2025-08-29 DIAGNOSIS — I44.7 LEFT BUNDLE BRANCH BLOCK: Chronic | ICD-10-CM

## 2025-08-29 DIAGNOSIS — E66.9 OBESITY (BMI 30-39.9): Chronic | ICD-10-CM

## 2025-08-29 DIAGNOSIS — M54.41 CHRONIC LEFT-SIDED LOW BACK PAIN WITH RIGHT-SIDED SCIATICA: Chronic | ICD-10-CM

## 2025-08-29 DIAGNOSIS — Z79.899 CONTROLLED SUBSTANCE AGREEMENT SIGNED: Chronic | ICD-10-CM

## 2025-08-29 LAB
25(OH)D3 SERPL-MCNC: 36 NG/ML (ref 30–100)
BASOPHILS ABSOLUTE: 0 THOU/MM3 (ref 0–0.1)
BASOPHILS NFR BLD AUTO: 1 %
CALCIUM SERPL-MCNC: 8.5 MG/DL (ref 8.5–10.5)
DEPRECATED RDW RBC AUTO: 46.3 FL (ref 35–45)
EOSINOPHIL NFR BLD AUTO: 4.6 %
EOSINOPHILS ABSOLUTE: 0.2 THOU/MM3 (ref 0–0.4)
ERYTHROCYTE [DISTWIDTH] IN BLOOD BY AUTOMATED COUNT: 13 % (ref 11.5–14.5)
ERYTHROCYTE [SEDIMENTATION RATE] IN BLOOD BY WESTERGREN METHOD: 23 MM/HR (ref 0–20)
HCT VFR BLD AUTO: 34.3 % (ref 37–47)
HGB BLD-MCNC: 10.8 GM/DL (ref 12–16)
IMM GRANULOCYTES # BLD AUTO: 0 THOU/MM3 (ref 0–0.07)
IMM GRANULOCYTES NFR BLD AUTO: 0 %
LYMPHOCYTES ABSOLUTE: 1.3 THOU/MM3 (ref 1–4.8)
LYMPHOCYTES NFR BLD AUTO: 31.4 %
MAGNESIUM SERPL-MCNC: 2.3 MG/DL (ref 1.6–2.6)
MCH RBC QN AUTO: 30.3 PG (ref 26–33)
MCHC RBC AUTO-ENTMCNC: 31.5 GM/DL (ref 32.2–35.5)
MCV RBC AUTO: 96.1 FL (ref 81–99)
MONOCYTES ABSOLUTE: 0.3 THOU/MM3 (ref 0.4–1.3)
MONOCYTES NFR BLD AUTO: 6.8 %
NEUTROPHILS ABSOLUTE: 2.2 THOU/MM3 (ref 1.8–7.7)
NEUTROPHILS NFR BLD AUTO: 56.2 %
NRBC BLD AUTO-RTO: 0 /100 WBC
PLATELET # BLD AUTO: 165 THOU/MM3 (ref 130–400)
PMV BLD AUTO: 10 FL (ref 9.4–12.4)
POTASSIUM SERPL-SCNC: 4 MEQ/L (ref 3.5–5.2)
PTH-INTACT SERPL-MCNC: 128 PG/ML (ref 15–65)
RBC # BLD AUTO: 3.57 MILL/MM3 (ref 4.2–5.4)
WBC # BLD AUTO: 4 THOU/MM3 (ref 4.8–10.8)

## 2025-08-30 DIAGNOSIS — M15.0 PRIMARY OSTEOARTHRITIS INVOLVING MULTIPLE JOINTS: Primary | ICD-10-CM

## 2025-08-30 DIAGNOSIS — Z79.899 CONTROLLED SUBSTANCE AGREEMENT SIGNED: ICD-10-CM

## 2025-08-30 LAB — C-REACTIVE PROTEIN, HIGH SENSITIVITY: 0.8 MG/L (ref 0–3)

## 2025-09-02 RX ORDER — TRAMADOL HYDROCHLORIDE 50 MG/1
50 TABLET ORAL 2 TIMES DAILY PRN
Qty: 60 TABLET | Refills: 2 | Status: SHIPPED | OUTPATIENT
Start: 2025-09-02 | End: 2025-12-01